# Patient Record
Sex: MALE | Race: WHITE | NOT HISPANIC OR LATINO | Employment: OTHER | URBAN - NONMETROPOLITAN AREA
[De-identification: names, ages, dates, MRNs, and addresses within clinical notes are randomized per-mention and may not be internally consistent; named-entity substitution may affect disease eponyms.]

---

## 2017-05-22 ENCOUNTER — TELEPHONE (OUTPATIENT)
Dept: ENDOCRINOLOGY | Facility: CLINIC | Age: 32
End: 2017-05-22

## 2017-12-18 ENCOUNTER — OFFICE VISIT (OUTPATIENT)
Dept: ENDOCRINOLOGY | Facility: CLINIC | Age: 32
End: 2017-12-18

## 2017-12-18 VITALS
HEART RATE: 82 BPM | HEIGHT: 72 IN | SYSTOLIC BLOOD PRESSURE: 122 MMHG | DIASTOLIC BLOOD PRESSURE: 72 MMHG | WEIGHT: 159.9 LBS | BODY MASS INDEX: 21.66 KG/M2

## 2017-12-18 DIAGNOSIS — E10.42 DIABETIC POLYNEUROPATHY ASSOCIATED WITH TYPE 1 DIABETES MELLITUS (HCC): ICD-10-CM

## 2017-12-18 DIAGNOSIS — Z72.0 TOBACCO ABUSE DISORDER: ICD-10-CM

## 2017-12-18 DIAGNOSIS — E10.3493 TYPE 1 DIABETES MELLITUS WITH SEVERE NONPROLIFERATIVE RETINOPATHY OF BOTH EYES, MACULAR EDEMA PRESENCE UNSPECIFIED (HCC): Primary | ICD-10-CM

## 2017-12-18 LAB — GLUCOSE BLDC GLUCOMTR-MCNC: 413 MG/DL (ref 70–130)

## 2017-12-18 PROCEDURE — 99204 OFFICE O/P NEW MOD 45 MIN: CPT | Performed by: INTERNAL MEDICINE

## 2017-12-18 PROCEDURE — 99406 BEHAV CHNG SMOKING 3-10 MIN: CPT | Performed by: INTERNAL MEDICINE

## 2017-12-18 PROCEDURE — 95250 CONT GLUC MNTR PHYS/QHP EQP: CPT | Performed by: INTERNAL MEDICINE

## 2017-12-18 RX ORDER — PANTOPRAZOLE SODIUM 40 MG/1
40 TABLET, DELAYED RELEASE ORAL 2 TIMES DAILY
COMMUNITY
End: 2019-03-19 | Stop reason: HOSPADM

## 2017-12-18 RX ORDER — GLUCOSAMINE HCL/CHONDROITIN SU 500-400 MG
CAPSULE ORAL
Qty: 120 EACH | Refills: 11 | Status: SHIPPED | OUTPATIENT
Start: 2017-12-18

## 2017-12-18 RX ORDER — ISOPROPYL ALCOHOL 0.7 ML/1
SWAB TOPICAL
Qty: 120 EACH | Refills: 11 | Status: SHIPPED | OUTPATIENT
Start: 2017-12-18

## 2017-12-18 RX ORDER — LANCING DEVICE
EACH MISCELLANEOUS
Qty: 1 EACH | Refills: 1 | Status: SHIPPED | OUTPATIENT
Start: 2017-12-18

## 2017-12-18 NOTE — PROGRESS NOTES
Jose Shah is a 32 y.o. male who presents for  evaluation of   Chief Complaint   Patient presents with   • Diabetes       Referring provider    Dagmar Kaur MD  [address] [place] [address]    Primary Care Provider    [facility]    Duration since age 13 years     Timing - Diabetes is Constant    Quality -  poorly controlled    Severity -  severe    Complications - peripheral neuropathy and h o DKA    Current symptoms/problems  increase appetite, paresthesia of the feet, polydipsia and polyuria     Alleviating Factors: Compliance      Side Effects  none    Current diet  high carb    Current exercise none    Current monitoring regimen: home blood tests - rarely checking  Home blood sugar records:    Hypoglycemia if skipped meals     Past Medical History:   Diagnosis Date   • Diabetic polyneuropathy associated with type 1 diabetes mellitus 12/18/2017   • Tobacco abuse disorder 12/18/2017   • Type 1 diabetes mellitus with severe nonproliferative retinopathy of both eyes 12/18/2017     Family History   Problem Relation Age of Onset   • Hypertension Mother      Social History   Substance Use Topics   • Smoking status: Current Every Day Smoker   • Smokeless tobacco: Never Used   • Alcohol use None         Current Outpatient Prescriptions:   •  pantoprazole (PROTONIX) 40 MG EC tablet, Take 40 mg by mouth Daily., Disp: , Rfl:   •  Alcohol Swabs (ALCOHOL WIPES) 70 % pads, Use 4 x daily, Disp: 120 each, Rfl: 11  •  Blood Glucose Monitoring Suppl w/Device kit, USE AS INDICATED, ANY MONITOR, Disp: 1 each, Rfl: 1  •  Glucose Blood (BLOOD GLUCOSE TEST) strip, Use 4 x daily, use any brand covered by insurance or same brand as before, Disp: 120 each, Rfl: 11  •  insulin aspart (novoLOG) 100 UNIT/ML injection, Up to 150 units daily through pump, Disp: 5 each, Rfl: 11  •  Lancet Devices (LANCING DEVICE) misc, USE AS INDICATED TO CORRELATE WITH STRIPS AND METER, Disp: 1 each, Rfl: 1  •  Lancets 30G misc, USE 4 X DAILY,  Disp: 120 each, Rfl: 11  •  Urine Glucose-Ketones Test strip, 1 each by In Vitro route As Needed (elevated glucose)., Disp: 100 each, Rfl: 11    Review of Systems    Review of Systems   Constitutional: Positive for fatigue and unexpected weight change. Negative for activity change, appetite change, chills, diaphoresis and fever.   HENT: Negative for congestion, dental problem, drooling, ear discharge, ear pain, facial swelling, mouth sores, postnasal drip, rhinorrhea, sinus pressure, sore throat, tinnitus, trouble swallowing and voice change.    Eyes: Negative for photophobia, pain, discharge, redness, itching and visual disturbance.   Respiratory: Negative for apnea, cough, choking, chest tightness, shortness of breath, wheezing and stridor.    Cardiovascular: Negative for chest pain, palpitations and leg swelling.   Gastrointestinal: Negative for abdominal distention, abdominal pain, constipation, diarrhea, nausea and vomiting.   Endocrine: Positive for polydipsia and polyphagia. Negative for cold intolerance, heat intolerance and polyuria.   Genitourinary: Negative for decreased urine volume, difficulty urinating, dysuria, flank pain, frequency, hematuria and urgency.   Musculoskeletal: Positive for arthralgias and myalgias. Negative for back pain, gait problem, joint swelling, neck pain and neck stiffness.   Skin: Negative for color change, pallor, rash and wound.   Allergic/Immunologic: Negative for immunocompromised state.   Neurological: Positive for weakness. Negative for dizziness, tremors, seizures, syncope, facial asymmetry, speech difficulty, light-headedness, numbness and headaches.   Hematological: Negative for adenopathy.   Psychiatric/Behavioral: Negative for agitation, behavioral problems, confusion, decreased concentration, dysphoric mood, hallucinations, self-injury, sleep disturbance and suicidal ideas. The patient is not nervous/anxious and is not hyperactive.         Objective:   /72 (BP  "Location: Right arm, Patient Position: Sitting, Cuff Size: Large Adult)  Pulse 82  Ht 182.9 cm (72\")  Wt 72.5 kg (159 lb 14.4 oz)  BMI 21.69 kg/m2    Physical Exam   Constitutional: He is oriented to person, place, and time. He appears well-developed and well-nourished. He is cooperative.   HENT:   Head: Normocephalic and atraumatic.   Right Ear: External ear normal.   Left Ear: External ear normal.   Nose: Nose normal.   Mouth/Throat: Oropharynx is clear and moist. No oropharyngeal exudate.   Eyes: Conjunctivae and EOM are normal. Pupils are equal, round, and reactive to light. No scleral icterus. Right eye exhibits normal extraocular motion. Left eye exhibits normal extraocular motion.   Neck: Neck supple. No JVD present. No muscular tenderness present. No tracheal deviation, no edema and no erythema present. No thyromegaly present.   Cardiovascular: Normal rate, regular rhythm, normal heart sounds and intact distal pulses.  Exam reveals no gallop and no friction rub.    No murmur heard.  Pulmonary/Chest: Effort normal and breath sounds normal. No stridor. No respiratory distress. He has no decreased breath sounds. He has no wheezes. He has no rhonchi. He has no rales. He exhibits no tenderness.   Abdominal: Soft. Bowel sounds are normal. He exhibits no distension and no mass. There is no hepatomegaly. There is no tenderness. There is no rebound and no guarding. No hernia.   Musculoskeletal: Normal range of motion. He exhibits no edema, tenderness or deformity.   Lymphadenopathy:     He has no cervical adenopathy.   Neurological: He is alert and oriented to person, place, and time. He has normal reflexes. No cranial nerve deficit. He exhibits normal muscle tone. Coordination normal.   Skin: Skin is warm. No rash noted. No erythema. No pallor.   Psychiatric: He has a normal mood and affect. His behavior is normal. Judgment and thought content normal.   Nursing note and vitals reviewed.      Lab " Review    Results for orders placed or performed in visit on 12/18/17   POC Glucose Fingerstick   Result Value Ref Range    Glucose 413 (A) 70 - 130 mg/dL           Assessment/Plan       ICD-10-CM ICD-9-CM   1. Type 1 diabetes mellitus with severe nonproliferative retinopathy of both eyes, macular edema presence unspecified E10.3493 250.51     362.06   2. Diabetic polyneuropathy associated with type 1 diabetes mellitus E10.42 250.61     357.2   3. Tobacco abuse disorder Z72.0 305.1       Glycemic Management:   No results found for: HGBA1C  No results found for: GLUCOSE, BUN, CREATININE, EGFRIFNONA, EGFRIFAFRI, BCR, K, CO2, CALCIUM, PROTENTOTREF, ALBUMIN, LABIL2, AST, ALT, ANIONGAP  No results found for: WBC, HGB, HCT, MCV, PLT    Basal     MN 1 u per hour  3 am 1.8 u per hour  -1.6  Noon 2 u per hour  -1.6  7pm 1.2 u per hour    Carb Ratio 8-10    Sensitivity 30-50    Uncontrolled diabetes  Hypoglycemia and Hyperglycemia    Insertion of continuous glucose monitor to define pattern    The continuous glucose monitor that was inserted is a yasmin glucose monitor      Lipid Management  No results found for: CHOL  No results found for: TRIG  No results found for: HDL  No results found for: LDLCALC  No results found for: LDL  No results found for: LDLDIRECT            Blood Pressure Management:    Vitals:    12/18/17 1503   BP: 122/72   Pulse: 82     No results found for: GLUCOSE, CALCIUM, NA, K, CO2, CL, BUN, CREATININE, EGFRIFAFRI, EGFRIFNONA, BCR, ANIONGAP            Microvascular Complication Monitoring:      Eye Exam Evaluation, within 1 year    -----------    Last Microalbumin-Proteinuria Assessment    No results found for: MALBCRERATIO    No results found for: UTPCR    -----------      Neuropathy, yes       Preventive Care:      I advised the patient of the risks in continuing to use tobacco, and I provided this patient with smoking cessation educational materials.  I also discussed how to quit smoking and the  patient has expressed the willingness to quit.      During this visit, I spent 3-10 mintues counseling the patient regarding smoking cessation.             Weight Related:   Wt Readings from Last 3 Encounters:   12/18/17 72.5 kg (159 lb 14.4 oz)     Body mass index is 21.69 kg/(m^2).        Diet interventions: moderate (500 kCal/d) deficit diet.      Bone Health    No results found for: PTH, CALCIUM, CAION, PHOS, [id number]    Thyroid Health    No results found for: TSH          Other Diabetes Related Aspects       No results found for: IGHXTIXS04        Last celiac panel     No results found for: GDPABIGA, TTRANSGLIGA, IGA, [id number]      I reviewed and summarized records from [facility] from 2017 and I reviewed / ordered labs.     Orders Placed This Encounter   Procedures   • POC Glucose Fingerstick         A copy of my note was sent to [facility]    Please see my above opinion and suggestions.

## 2018-03-30 ENCOUNTER — APPOINTMENT (OUTPATIENT)
Dept: LAB | Facility: HOSPITAL | Age: 33
End: 2018-03-30

## 2018-03-30 ENCOUNTER — OFFICE VISIT (OUTPATIENT)
Dept: ENDOCRINOLOGY | Facility: CLINIC | Age: 33
End: 2018-03-30

## 2018-03-30 VITALS
HEIGHT: 72 IN | DIASTOLIC BLOOD PRESSURE: 74 MMHG | HEART RATE: 85 BPM | OXYGEN SATURATION: 96 % | SYSTOLIC BLOOD PRESSURE: 110 MMHG | BODY MASS INDEX: 21.67 KG/M2 | WEIGHT: 160 LBS

## 2018-03-30 DIAGNOSIS — E55.9 VITAMIN D DEFICIENCY: ICD-10-CM

## 2018-03-30 DIAGNOSIS — E53.8 B12 DEFICIENCY: ICD-10-CM

## 2018-03-30 DIAGNOSIS — IMO0002 TYPE 1 DIABETES, UNCONTROLLED, WITH GASTROPARESIS: Primary | ICD-10-CM

## 2018-03-30 LAB
25(OH)D3 SERPL-MCNC: 22.5 NG/ML (ref 30–100)
ALBUMIN SERPL-MCNC: 4 G/DL (ref 3.4–4.8)
ALBUMIN UR-MCNC: 59.5 MG/L
ALBUMIN/GLOB SERPL: 1.3 G/DL (ref 1.1–1.8)
ALP SERPL-CCNC: 95 U/L (ref 38–126)
ALT SERPL W P-5'-P-CCNC: 28 U/L (ref 21–72)
ANION GAP SERPL CALCULATED.3IONS-SCNC: 11 MMOL/L (ref 5–15)
ARTICHOKE IGE QN: 154 MG/DL (ref 1–129)
AST SERPL-CCNC: 57 U/L (ref 17–59)
BASOPHILS # BLD AUTO: 0.04 10*3/MM3 (ref 0–0.2)
BASOPHILS NFR BLD AUTO: 0.6 % (ref 0–2)
BILIRUB SERPL-MCNC: 0.7 MG/DL (ref 0.2–1.3)
BUN BLD-MCNC: 29 MG/DL (ref 7–21)
BUN/CREAT SERPL: 26.9 (ref 7–25)
CALCIUM SPEC-SCNC: 10.3 MG/DL (ref 8.4–10.2)
CHLORIDE SERPL-SCNC: 98 MMOL/L (ref 95–110)
CHOLEST SERPL-MCNC: 227 MG/DL (ref 0–199)
CO2 SERPL-SCNC: 31 MMOL/L (ref 22–31)
CREAT BLD-MCNC: 1.08 MG/DL (ref 0.7–1.3)
CREAT UR-MCNC: 85.5 MG/DL
DEPRECATED RDW RBC AUTO: 38.9 FL (ref 35.1–43.9)
EOSINOPHIL # BLD AUTO: 0.13 10*3/MM3 (ref 0–0.7)
EOSINOPHIL NFR BLD AUTO: 2 % (ref 0–7)
ERYTHROCYTE [DISTWIDTH] IN BLOOD BY AUTOMATED COUNT: 12.6 % (ref 11.5–14.5)
GFR SERPL CREATININE-BSD FRML MDRD: 79 ML/MIN/1.73 (ref 70–162)
GLOBULIN UR ELPH-MCNC: 3.2 GM/DL (ref 2.3–3.5)
GLUCOSE BLD-MCNC: 259 MG/DL (ref 60–100)
GLUCOSE BLDC GLUCOMTR-MCNC: 336 MG/DL (ref 70–130)
HBA1C MFR BLD: 12.2 % (ref 4–5.6)
HCT VFR BLD AUTO: 36.6 % (ref 39–49)
HDLC SERPL-MCNC: 31 MG/DL (ref 60–200)
HGB BLD-MCNC: 13.3 G/DL (ref 13.7–17.3)
IMM GRANULOCYTES # BLD: 0.01 10*3/MM3 (ref 0–0.02)
IMM GRANULOCYTES NFR BLD: 0.2 % (ref 0–0.5)
LDLC/HDLC SERPL: 5.73 {RATIO} (ref 0–3.55)
LYMPHOCYTES # BLD AUTO: 2.63 10*3/MM3 (ref 0.6–4.2)
LYMPHOCYTES NFR BLD AUTO: 40.2 % (ref 10–50)
MCH RBC QN AUTO: 30.8 PG (ref 26.5–34)
MCHC RBC AUTO-ENTMCNC: 36.3 G/DL (ref 31.5–36.3)
MCV RBC AUTO: 84.7 FL (ref 80–98)
MICROALBUMIN/CREAT UR: 695.9 MG/G (ref 0–30)
MONOCYTES # BLD AUTO: 0.33 10*3/MM3 (ref 0–0.9)
MONOCYTES NFR BLD AUTO: 5 % (ref 0–12)
NEUTROPHILS # BLD AUTO: 3.4 10*3/MM3 (ref 2–8.6)
NEUTROPHILS NFR BLD AUTO: 52 % (ref 37–80)
PLATELET # BLD AUTO: 203 10*3/MM3 (ref 150–450)
PMV BLD AUTO: 9.9 FL (ref 8–12)
POTASSIUM BLD-SCNC: 4.8 MMOL/L (ref 3.5–5.1)
PROT SERPL-MCNC: 7.2 G/DL (ref 6.3–8.6)
RBC # BLD AUTO: 4.32 10*6/MM3 (ref 4.37–5.74)
SODIUM BLD-SCNC: 140 MMOL/L (ref 137–145)
TRIGL SERPL-MCNC: 92 MG/DL (ref 20–199)
TSH SERPL DL<=0.05 MIU/L-ACNC: 4.53 MIU/ML (ref 0.46–4.68)
VIT B12 BLD-MCNC: 903 PG/ML (ref 239–931)
WBC NRBC COR # BLD: 6.54 10*3/MM3 (ref 3.2–9.8)

## 2018-03-30 PROCEDURE — 84443 ASSAY THYROID STIM HORMONE: CPT | Performed by: INTERNAL MEDICINE

## 2018-03-30 PROCEDURE — 36415 COLL VENOUS BLD VENIPUNCTURE: CPT | Performed by: INTERNAL MEDICINE

## 2018-03-30 PROCEDURE — 83516 IMMUNOASSAY NONANTIBODY: CPT | Performed by: INTERNAL MEDICINE

## 2018-03-30 PROCEDURE — 80053 COMPREHEN METABOLIC PANEL: CPT | Performed by: INTERNAL MEDICINE

## 2018-03-30 PROCEDURE — 80061 LIPID PANEL: CPT | Performed by: INTERNAL MEDICINE

## 2018-03-30 PROCEDURE — 82784 ASSAY IGA/IGD/IGG/IGM EACH: CPT | Performed by: INTERNAL MEDICINE

## 2018-03-30 PROCEDURE — 95250 CONT GLUC MNTR PHYS/QHP EQP: CPT | Performed by: INTERNAL MEDICINE

## 2018-03-30 PROCEDURE — 83036 HEMOGLOBIN GLYCOSYLATED A1C: CPT | Performed by: INTERNAL MEDICINE

## 2018-03-30 PROCEDURE — 82043 UR ALBUMIN QUANTITATIVE: CPT | Performed by: INTERNAL MEDICINE

## 2018-03-30 PROCEDURE — 85025 COMPLETE CBC W/AUTO DIFF WBC: CPT | Performed by: INTERNAL MEDICINE

## 2018-03-30 PROCEDURE — 99214 OFFICE O/P EST MOD 30 MIN: CPT | Performed by: INTERNAL MEDICINE

## 2018-03-30 PROCEDURE — 82607 VITAMIN B-12: CPT | Performed by: INTERNAL MEDICINE

## 2018-03-30 PROCEDURE — 82306 VITAMIN D 25 HYDROXY: CPT | Performed by: INTERNAL MEDICINE

## 2018-03-30 PROCEDURE — 82570 ASSAY OF URINE CREATININE: CPT | Performed by: INTERNAL MEDICINE

## 2018-03-30 NOTE — PROGRESS NOTES
Jose Shah is a 32 y.o. male who presents for  evaluation of   Chief Complaint   Patient presents with   • Diabetes     BS/336       Referring provider    No referring provider defined for this encounter.    Primary Care Provider    M[facility]OU[facility] Guy    Duration since age 13 years     Timing - Diabetes is Constant    Quality -  poorly controlled    Severity -  severe    Complications - peripheral neuropathy and h o DKA    Current symptoms/problems  increase appetite, paresthesia of the feet, polydipsia and polyuria     Alleviating Factors: Compliance      Side Effects  none    Current diet  high carb    Current exercise none    Current monitoring regimen: home blood tests - Testing 4 x daily for the past 90 days    Home blood sugar records:    Hypoglycemia if skipped meals      Range 50 to 500             Past Medical History:   Diagnosis Date   • Diabetic polyneuropathy associated with type 1 diabetes mellitus 12/18/2017   • Tobacco abuse disorder 12/18/2017   • Type 1 diabetes mellitus with severe nonproliferative retinopathy of both eyes 12/18/2017     Family History   Problem Relation Age of Onset   • Hypertension Mother      Social History   Substance Use Topics   • Smoking status: Current Every Day Smoker   • Smokeless tobacco: Never Used   • Alcohol use Not on file         Current Outpatient Prescriptions:   •  Alcohol Swabs (ALCOHOL WIPES) 70 % pads, Use 4 x daily, Disp: 120 each, Rfl: 11  •  Blood Glucose Monitoring Suppl w/Device kit, USE AS INDICATED, ANY MONITOR, Disp: 1 each, Rfl: 1  •  Glucose Blood (BLOOD GLUCOSE TEST) strip, Use 4 x daily, use any brand covered by insurance or same brand as before, Disp: 120 each, Rfl: 11  •  insulin aspart (novoLOG) 100 UNIT/ML injection, Up to 150 units daily through pump, Disp: 5 each, Rfl: 11  •  Lancet Devices (LANCING DEVICE) misc, USE AS INDICATED TO CORRELATE WITH STRIPS AND METER, Disp: 1 each, Rfl: 1  •  Lancets 30G misc, USE 4 X DAILY, Disp: 120 each,  Rfl: 11  •  pantoprazole (PROTONIX) 40 MG EC tablet, Take 40 mg by mouth Daily., Disp: , Rfl:   •  Urine Glucose-Ketones Test strip, 1 each by In Vitro route As Needed (elevated glucose)., Disp: 100 each, Rfl: 11    Review of Systems    Review of Systems   Constitutional: Positive for fatigue and unexpected weight change. Negative for activity change, appetite change, chills, diaphoresis and fever.   HENT: Negative for congestion, dental problem, drooling, ear discharge, ear pain, facial swelling, mouth sores, postnasal drip, rhinorrhea, sinus pressure, sore throat, tinnitus, trouble swallowing and voice change.    Eyes: Negative for photophobia, pain, discharge, redness, itching and visual disturbance.   Respiratory: Negative for apnea, cough, choking, chest tightness, shortness of breath, wheezing and stridor.    Cardiovascular: Negative for chest pain, palpitations and leg swelling.   Gastrointestinal: Negative for abdominal distention, abdominal pain, constipation, diarrhea, nausea and vomiting.   Endocrine: Positive for polydipsia and polyphagia. Negative for cold intolerance, heat intolerance and polyuria.   Genitourinary: Negative for decreased urine volume, difficulty urinating, dysuria, flank pain, frequency, hematuria and urgency.   Musculoskeletal: Positive for arthralgias and myalgias. Negative for back pain, gait problem, joint swelling, neck pain and neck stiffness.   Skin: Negative for color change, pallor, rash and wound.   Allergic/Immunologic: Negative for immunocompromised state.   Neurological: Positive for weakness. Negative for dizziness, tremors, seizures, syncope, facial asymmetry, speech difficulty, light-headedness, numbness and headaches.   Hematological: Negative for adenopathy.   Psychiatric/Behavioral: Negative for agitation, behavioral problems, confusion, decreased concentration, dysphoric mood, hallucinations, self-injury, sleep disturbance and suicidal ideas. The patient is not  "nervous/anxious and is not hyperactive.         Objective:   /74 (BP Location: Left arm, Patient Position: Sitting, Cuff Size: Large Adult)   Pulse 85   Ht 182.9 cm (72\")   Wt 72.6 kg (160 lb)   SpO2 96%   BMI 21.70 kg/m²     Physical Exam   Constitutional: He is oriented to person, place, and time. He appears well-developed and well-nourished. He is cooperative.   HENT:   Head: Normocephalic and atraumatic.   Right Ear: External ear normal.   Left Ear: External ear normal.   Nose: Nose normal.   Mouth/Throat: Oropharynx is clear and moist. No oropharyngeal exudate.   Eyes: Conjunctivae and EOM are normal. Pupils are equal, round, and reactive to light. No scleral icterus. Right eye exhibits normal extraocular motion. Left eye exhibits normal extraocular motion.   Neck: Neck supple. No JVD present. No muscular tenderness present. No tracheal deviation, no edema and no erythema present. No thyromegaly present.   Cardiovascular: Normal rate, regular rhythm, normal heart sounds and intact distal pulses.  Exam reveals no gallop and no friction rub.    No murmur heard.  Pulmonary/Chest: Effort normal and breath sounds normal. No stridor. No respiratory distress. He has no decreased breath sounds. He has no wheezes. He has no rhonchi. He has no rales. He exhibits no tenderness.   Abdominal: Soft. Bowel sounds are normal. He exhibits no distension and no mass. There is no hepatomegaly. There is no tenderness. There is no rebound and no guarding. No hernia.   Musculoskeletal: Normal range of motion. He exhibits no edema, tenderness or deformity.   Lymphadenopathy:     He has no cervical adenopathy.   Neurological: He is alert and oriented to person, place, and time. He has normal reflexes. No cranial nerve deficit. He exhibits normal muscle tone. Coordination normal.   Skin: Skin is warm. No rash noted. No erythema. No pallor.   Psychiatric: He has a normal mood and affect. His behavior is normal. Judgment and " thought content normal.   Nursing note and vitals reviewed.      Lab Review    Results for orders placed or performed in visit on 03/30/18   CBC Auto Differential   Result Value Ref Range    WBC 6.54 3.20 - 9.80 10*3/mm3    RBC 4.32 (L) 4.37 - 5.74 10*6/mm3    Hemoglobin 13.3 (L) 13.7 - 17.3 g/dL    Hematocrit 36.6 (L) 39.0 - 49.0 %    MCV 84.7 80.0 - 98.0 fL    MCH 30.8 26.5 - 34.0 pg    MCHC 36.3 31.5 - 36.3 g/dL    RDW 12.6 11.5 - 14.5 %    RDW-SD 38.9 35.1 - 43.9 fl    MPV 9.9 8.0 - 12.0 fL    Platelets 203 150 - 450 10*3/mm3    Neutrophil % 52.0 37.0 - 80.0 %    Lymphocyte % 40.2 10.0 - 50.0 %    Monocyte % 5.0 0.0 - 12.0 %    Eosinophil % 2.0 0.0 - 7.0 %    Basophil % 0.6 0.0 - 2.0 %    Immature Grans % 0.2 0.0 - 0.5 %    Neutrophils, Absolute 3.40 2.00 - 8.60 10*3/mm3    Lymphocytes, Absolute 2.63 0.60 - 4.20 10*3/mm3    Monocytes, Absolute 0.33 0.00 - 0.90 10*3/mm3    Eosinophils, Absolute 0.13 0.00 - 0.70 10*3/mm3    Basophils, Absolute 0.04 0.00 - 0.20 10*3/mm3    Immature Grans, Absolute 0.01 0.00 - 0.02 10*3/mm3   Celiac Panel Reflex To Titer   Result Value Ref Range    Gliadin Deamidated Peptide Ab, IgA 4 0 - 19 units    Tissue Transglutaminase IgA <2 0 - 3 U/mL    IgA 230 90 - 386 mg/dL   Comprehensive Metabolic Panel   Result Value Ref Range    Glucose 259 (H) 60 - 100 mg/dL    BUN 29 (H) 7 - 21 mg/dL    Creatinine 1.08 0.70 - 1.30 mg/dL    Sodium 140 137 - 145 mmol/L    Potassium 4.8 3.5 - 5.1 mmol/L    Chloride 98 95 - 110 mmol/L    CO2 31.0 22.0 - 31.0 mmol/L    Calcium 10.3 (H) 8.4 - 10.2 mg/dL    Total Protein 7.2 6.3 - 8.6 g/dL    Albumin 4.00 3.40 - 4.80 g/dL    ALT (SGPT) 28 21 - 72 U/L    AST (SGOT) 57 17 - 59 U/L    Alkaline Phosphatase 95 38 - 126 U/L    Total Bilirubin 0.7 0.2 - 1.3 mg/dL    eGFR Non  Amer 79 70 - 162 mL/min/1.73    Globulin 3.2 2.3 - 3.5 gm/dL    A/G Ratio 1.3 1.1 - 1.8 g/dL    BUN/Creatinine Ratio 26.9 (H) 7.0 - 25.0    Anion Gap 11.0 5.0 - 15.0 mmol/L    Hemoglobin A1c   Result Value Ref Range    Hemoglobin A1C 12.2 (H) 4 - 5.6 %   Lipid Panel   Result Value Ref Range    Total Cholesterol 227 (H) 0 - 199 mg/dL    Triglycerides 92 20 - 199 mg/dL    HDL Cholesterol 31 (L) 60 - 200 mg/dL    LDL Cholesterol  154 (H) 1 - 129 mg/dL    LDL/HDL Ratio 5.73 (H) 0.00 - 3.55   Microalbumin / Creatinine Urine Ratio - Urine, Clean Catch   Result Value Ref Range    Microalbumin/Creatinine Ratio 695.9 (H) 0.0 - 30.0 mg/g    Creatinine, Urine 85.5 mg/dL    Microalbumin, Urine 59.5 mg/L   TSH   Result Value Ref Range    TSH 4.530 0.460 - 4.680 mIU/mL   Vitamin B12   Result Value Ref Range    Vitamin B-12 903 239 - 931 pg/mL   Vitamin D 25 Hydroxy   Result Value Ref Range    25 Hydroxy, Vitamin D 22.5 (L) 30.0 - 100.0 ng/ml   POC Glucose   Result Value Ref Range    Glucose 336 (A) 70 - 130 mg/dL           Assessment/Plan       ICD-10-CM ICD-9-CM   1. Type 1 diabetes, uncontrolled, with gastroparesis E10.43 250.63    K31.84 536.3    E10.65    2. Vitamin D deficiency E55.9 268.9   3. B12 deficiency E53.8 266.2       Glycemic Management:   Lab Results   Component Value Date    HGBA1C 12.2 (H) 03/30/2018     Lab Results   Component Value Date    GLUCOSE 259 (H) 03/30/2018    BUN 29 (H) 03/30/2018    CREATININE 1.08 03/30/2018    EGFRIFNONA 79 03/30/2018    BCR 26.9 (H) 03/30/2018    K 4.8 03/30/2018    CO2 31.0 03/30/2018    CALCIUM 10.3 (H) 03/30/2018    ALBUMIN 4.00 03/30/2018    LABIL2 1.3 03/30/2018    AST 57 03/30/2018    ALT 28 03/30/2018    ANIONGAP 11.0 03/30/2018     Lab Results   Component Value Date    WBC 6.54 03/30/2018    HGB 13.3 (L) 03/30/2018    HCT 36.6 (L) 03/30/2018    MCV 84.7 03/30/2018     03/30/2018       Basal     MN 1 u per hour  3 am 1.8 u per hour  -1.6-1.4  Noon 2 u per hour  -1.6-1.4  7pm 1.2 u per hour    Carb Ratio 8-10    Sensitivity 30-50    Uncontrolled diabetes  Hypoglycemia and Hyperglycemia    Insertion of continuous glucose monitor to define  pattern    The continuous glucose monitor that was inserted is a yasmin glucose monitor      Patient makes self adjustments while using the pump     Widely fluctuating blood sugars          Lipid Management  Lab Results   Component Value Date    CHOL 227 (H) 03/30/2018     Lab Results   Component Value Date    TRIG 92 03/30/2018     Lab Results   Component Value Date    HDL 31 (L) 03/30/2018     No components found for: LDLCALC  Lab Results   Component Value Date     (H) 03/30/2018     No results found for: LDLDIRECT            Blood Pressure Management:    Vitals:    03/30/18 1100   BP: 110/74   Pulse: 85   SpO2: 96%     Lab Results   Component Value Date    GLUCOSE 259 (H) 03/30/2018    CALCIUM 10.3 (H) 03/30/2018     03/30/2018    K 4.8 03/30/2018    CO2 31.0 03/30/2018    CL 98 03/30/2018    BUN 29 (H) 03/30/2018    CREATININE 1.08 03/30/2018    EGFRIFNONA 79 03/30/2018    BCR 26.9 (H) 03/30/2018    ANIONGAP 11.0 03/30/2018               Microvascular Complication Monitoring:      Eye Exam Evaluation, within 1 year    -----------    Last Microalbumin-Proteinuria Assessment    Lab Results   Component Value Date    MALBCRERATIO 695.9 (H) 03/30/2018       No results found for: UTPCR    -----------      Neuropathy, yes       Preventive Care:      I advised the patient of the risks in continuing to use tobacco, and I provided this patient with smoking cessation educational materials.  I also discussed how to quit smoking and the patient has expressed the willingness to quit.      During this visit, I spent 3-10 mintues counseling the patient regarding smoking cessation.             Weight Related:   Wt Readings from Last 3 Encounters:   03/30/18 72.6 kg (160 lb)   12/18/17 72.5 kg (159 lb 14.4 oz)     Body mass index is 21.7 kg/m².        Diet interventions: moderate (500 kCal/d) deficit diet.      Bone Health    Lab Results   Component Value Date    CALCIUM 10.3 (H) 03/30/2018    [id number] 22.5 (L)  03/30/2018       Thyroid Health    Lab Results   Component Value Date    TSH 4.530 03/30/2018             Other Diabetes Related Aspects       Lab Results   Component Value Date    OQYBVTOD80 903 03/30/2018           Last celiac panel     Lab Results   Component Value Date    GDPABIGA 4 03/30/2018    TTRANSGLIGA <2 03/30/2018     03/30/2018         I reviewed and summarized records from [facility] from 2017 and I reviewed / ordered labs.     Orders Placed This Encounter   Procedures   • CBC Auto Differential   • Celiac Panel Reflex To Titer   • Comprehensive Metabolic Panel   • Hemoglobin A1c   • Lipid Panel   • Microalbumin / Creatinine Urine Ratio - Urine, Clean Catch   • TSH   • Vitamin B12   • Vitamin D 25 Hydroxy   • POC Glucose         A copy of my note was sent to [facility]    Please see my above opinion and suggestions.           This document has been electronically signed by Carlton Oconnor MD on July 24, 2018 10:13 AM

## 2018-03-31 LAB
GLIADIN PEPTIDE IGA SER-ACNC: 4 UNITS (ref 0–19)
IGA SERPL-MCNC: 230 MG/DL (ref 90–386)
TTG IGA SER-ACNC: <2 U/ML (ref 0–3)

## 2018-04-04 DIAGNOSIS — E53.8 B12 DEFICIENCY: ICD-10-CM

## 2018-04-04 DIAGNOSIS — E55.9 VITAMIN D DEFICIENCY: ICD-10-CM

## 2018-04-04 DIAGNOSIS — IMO0002 TYPE 1 DIABETES, UNCONTROLLED, WITH GASTROPARESIS: Primary | ICD-10-CM

## 2018-04-04 RX ORDER — ERGOCALCIFEROL 1.25 MG/1
50000 CAPSULE ORAL
Qty: 4 CAPSULE | Refills: 11 | Status: SHIPPED | OUTPATIENT
Start: 2018-04-04 | End: 2019-03-01

## 2018-07-19 ENCOUNTER — TELEPHONE (OUTPATIENT)
Dept: FAMILY MEDICINE CLINIC | Facility: CLINIC | Age: 33
End: 2018-07-19

## 2018-07-24 DIAGNOSIS — E10.65 UNCONTROLLED TYPE 1 DIABETES MELLITUS WITH HYPERGLYCEMIA (HCC): Primary | ICD-10-CM

## 2018-10-30 ENCOUNTER — OFFICE VISIT (OUTPATIENT)
Dept: ENDOCRINOLOGY | Facility: CLINIC | Age: 33
End: 2018-10-30

## 2018-10-30 VITALS
OXYGEN SATURATION: 98 % | DIASTOLIC BLOOD PRESSURE: 66 MMHG | SYSTOLIC BLOOD PRESSURE: 110 MMHG | HEART RATE: 116 BPM | HEIGHT: 72 IN | WEIGHT: 154.9 LBS | BODY MASS INDEX: 20.98 KG/M2

## 2018-10-30 DIAGNOSIS — E10.42 DIABETIC POLYNEUROPATHY ASSOCIATED WITH TYPE 1 DIABETES MELLITUS (HCC): ICD-10-CM

## 2018-10-30 DIAGNOSIS — Z72.0 TOBACCO ABUSE DISORDER: ICD-10-CM

## 2018-10-30 DIAGNOSIS — E55.9 VITAMIN D DEFICIENCY: ICD-10-CM

## 2018-10-30 DIAGNOSIS — E53.8 B12 DEFICIENCY: ICD-10-CM

## 2018-10-30 DIAGNOSIS — E10.65 UNCONTROLLED TYPE 1 DIABETES MELLITUS WITH HYPERGLYCEMIA (HCC): Primary | ICD-10-CM

## 2018-10-30 PROCEDURE — 95250 CONT GLUC MNTR PHYS/QHP EQP: CPT | Performed by: INTERNAL MEDICINE

## 2018-10-30 PROCEDURE — 99214 OFFICE O/P EST MOD 30 MIN: CPT | Performed by: INTERNAL MEDICINE

## 2018-11-13 ENCOUNTER — OFFICE VISIT (OUTPATIENT)
Dept: ENDOCRINOLOGY | Facility: CLINIC | Age: 33
End: 2018-11-13

## 2018-11-13 VITALS
BODY MASS INDEX: 23.57 KG/M2 | DIASTOLIC BLOOD PRESSURE: 80 MMHG | HEART RATE: 115 BPM | WEIGHT: 174 LBS | SYSTOLIC BLOOD PRESSURE: 158 MMHG | HEIGHT: 72 IN

## 2018-11-13 DIAGNOSIS — IMO0002 TYPE 1 DIABETES, UNCONTROLLED, WITH GASTROPARESIS: Primary | ICD-10-CM

## 2018-11-13 DIAGNOSIS — Z72.0 TOBACCO ABUSE DISORDER: ICD-10-CM

## 2018-11-13 DIAGNOSIS — E10.42 DIABETIC POLYNEUROPATHY ASSOCIATED WITH TYPE 1 DIABETES MELLITUS (HCC): ICD-10-CM

## 2018-11-13 DIAGNOSIS — E55.9 VITAMIN D DEFICIENCY: ICD-10-CM

## 2018-11-13 PROCEDURE — 99214 OFFICE O/P EST MOD 30 MIN: CPT | Performed by: NURSE PRACTITIONER

## 2018-11-13 NOTE — PROGRESS NOTES
Subjective    Jose Shah is a 33 y.o. male. he is here today for follow-up.    History of Present Illness       Primary Care Provider     Dai Boston APRN     Duration since age 13 years        Timing - Diabetes is Constant     Quality -  poorly controlled     Severity -  severe     Complications - peripheral neuropathy and h o DKA     Current symptoms/problems  increase appetite, paresthesia of the feet, polydipsia and polyuria      Alleviating Factors: Compliance       Side Effects  none     Current diet  high carb     Current exercise none     Current monitoring regimen: home blood tests - Testing 4 x daily for the past 90 days    Lab Results   Component Value Date    HGBA1C 12.2 (H) 03/30/2018          Home blood sugar records:        Tanisha fell off and he cannot find out       Medtronic insulin pump     Average bg 238           Hypoglycemia if skipped meals                           The following portions of the patient's history were reviewed and updated as appropriate:   Past Medical History:   Diagnosis Date   • Diabetic polyneuropathy associated with type 1 diabetes mellitus (CMS/[facility]) 12/18/2017   • Tobacco abuse disorder 12/18/2017   • Type 1 diabetes mellitus with severe nonproliferative retinopathy of both eyes (CMS/[facility]) 12/18/2017     No past surgical history on file.  Family History   Problem Relation Age of Onset   • Hypertension Mother        Current Outpatient Medications   Medication Sig Dispense Refill   • Alcohol Swabs (ALCOHOL WIPES) 70 % pads Use 4 x daily 120 each 11   • Blood Glucose Monitoring Suppl w/Device kit USE AS INDICATED, ANY MONITOR 1 each 1   • Glucose Blood (BLOOD GLUCOSE TEST) strip Use 4 x daily, use any brand covered by insurance or same brand as before 120 each 11   • insulin aspart (novoLOG) 100 UNIT/ML injection Up to 150 units daily through pump 5 each 11   • Lancet Devices (LANCING DEVICE) misc USE AS INDICATED TO CORRELATE WITH STRIPS AND METER 1 each 1   • Lancets  30G misc USE 4 X DAILY 120 each 11   • pantoprazole (PROTONIX) 40 MG EC tablet Take 40 mg by mouth Daily.     • Urine Glucose-Ketones Test strip 1 each by In Vitro route As Needed (elevated glucose). 100 each 11   • vitamin D (ERGOCALCIFEROL) 57783 units capsule capsule Take 1 capsule by mouth Every 7 (Seven) Days. 4 capsule 11     No current facility-administered medications for this visit.      Allergies   Allergen Reactions   • Penicillins Unknown (See Comments)     Has been allergic since a child     Social History     Socioeconomic History   • Marital status: Unknown     Spouse name: Not on file   • Number of children: Not on file   • Years of education: Not on file   • Highest education level: Not on file   Tobacco Use   • Smoking status: Current Every Day Smoker   • Smokeless tobacco: Never Used       Review of Systems  Review of Systems   Constitutional: Negative for activity change, appetite change, diaphoresis and fatigue.   HENT: Negative for facial swelling, sneezing, sore throat, tinnitus, trouble swallowing and voice change.    Eyes: Negative for photophobia, pain, discharge, redness, itching and visual disturbance.   Respiratory: Negative for apnea, cough, choking, chest tightness and shortness of breath.    Cardiovascular: Negative for chest pain, palpitations and leg swelling.   Gastrointestinal: Negative for abdominal distention, abdominal pain, constipation, diarrhea, nausea and vomiting.   Endocrine: Negative for cold intolerance, heat intolerance, polydipsia, polyphagia and polyuria.   Genitourinary: Negative for difficulty urinating, dysuria, frequency, hematuria and urgency.   Musculoskeletal: Negative for arthralgias, back pain, gait problem, joint swelling, myalgias, neck pain and neck stiffness.   Skin: Negative for color change, pallor, rash and wound.   Neurological: Negative for dizziness, tremors, weakness, light-headedness, numbness and headaches.   Hematological: Negative for  "adenopathy. Does not bruise/bleed easily.   Psychiatric/Behavioral: Negative for behavioral problems, confusion and sleep disturbance.        Objective    /80 (BP Location: Left arm, Patient Position: Sitting, Cuff Size: Adult)   Pulse 115   Ht 182.9 cm (72\")   Wt 78.9 kg (174 lb)   BMI 23.60 kg/m²   Physical Exam   Constitutional: He is oriented to person, place, and time. He appears well-developed and well-nourished. No distress.   HENT:   Head: Normocephalic and atraumatic.   Right Ear: External ear normal.   Left Ear: External ear normal.   Nose: Nose normal.   Eyes: Conjunctivae and EOM are normal. Pupils are equal, round, and reactive to light.   Neck: Normal range of motion. Neck supple. No tracheal deviation present. No thyromegaly present.   Cardiovascular: Normal rate, regular rhythm and normal heart sounds.   No murmur heard.  Pulmonary/Chest: Effort normal and breath sounds normal. No respiratory distress. He has no wheezes.   Abdominal: Soft. Bowel sounds are normal. There is no tenderness. There is no rebound and no guarding.   Musculoskeletal: Normal range of motion. He exhibits no edema, tenderness or deformity.   Neurological: He is alert and oriented to person, place, and time. No cranial nerve deficit.   Skin: Skin is warm and dry. No rash noted.   Psychiatric: He has a normal mood and affect. His behavior is normal. Judgment and thought content normal.       Lab Review  Glucose (mg/dL)   Date Value   03/30/2018 259 (H)     Sodium (mmol/L)   Date Value   03/30/2018 140     Potassium (mmol/L)   Date Value   03/30/2018 4.8     Chloride (mmol/L)   Date Value   03/30/2018 98     CO2 (mmol/L)   Date Value   03/30/2018 31.0     BUN (mg/dL)   Date Value   03/30/2018 29 (H)     Creatinine (mg/dL)   Date Value   03/30/2018 1.08     Hemoglobin A1C (%)   Date Value   03/30/2018 12.2 (H)     Triglycerides (mg/dL)   Date Value   03/30/2018 92     LDL Cholesterol  (mg/dL)   Date Value   03/30/2018 154 " (H)       Assessment/Plan      1. Type 1 diabetes, uncontrolled, with gastroparesis (CMS/[facility])    2. Diabetic polyneuropathy associated with type 1 diabetes mellitus (CMS/[facility])    3. Tobacco abuse disorder    4. Vitamin D deficiency    .    Medications prescribed:  Outpatient Encounter Medications as of 11/13/2018   Medication Sig Dispense Refill   • Alcohol Swabs (ALCOHOL WIPES) 70 % pads Use 4 x daily 120 each 11   • Blood Glucose Monitoring Suppl w/Device kit USE AS INDICATED, ANY MONITOR 1 each 1   • Glucose Blood (BLOOD GLUCOSE TEST) strip Use 4 x daily, use any brand covered by insurance or same brand as before 120 each 11   • insulin aspart (novoLOG) 100 UNIT/ML injection Up to 150 units daily through pump 5 each 11   • Lancet Devices (LANCING DEVICE) misc USE AS INDICATED TO CORRELATE WITH STRIPS AND METER 1 each 1   • Lancets 30G misc USE 4 X DAILY 120 each 11   • pantoprazole (PROTONIX) 40 MG EC tablet Take 40 mg by mouth Daily.     • Urine Glucose-Ketones Test strip 1 each by In Vitro route As Needed (elevated glucose). 100 each 11   • vitamin D (ERGOCALCIFEROL) 44677 units capsule capsule Take 1 capsule by mouth Every 7 (Seven) Days. 4 capsule 11     No facility-administered encounter medications on file as of 11/13/2018.        Orders placed during this encounter include:  No orders of the defined types were placed in this encounter.    Glycemic Management:         Lab Results   Component Value Date    HGBA1C 12.2 (H) 03/30/2018       Basal      MN 1 u per hour  3 am -1.4  Noon --1.4  7pm 1.2 u      Carb Ratio  7.5     Sensitivity -40             Patient makes self adjustments while using the pump     Widely fluctuating blood sugars      Noncompliance / nonadherence.        Lipid Management          Component      Latest Ref Rng & Units 3/30/2018   Total Cholesterol      0 - 199 mg/dL 227 (H)   Triglycerides      20 - 199 mg/dL 92   HDL Cholesterol      60 - 200 mg/dL 31 (L)   LDL Cholesterol        1 - 129 mg/dL 154 (H)   LDL/HDL Ratio      0.00 - 3.55 5.73 (H)           Blood Pressure Management:                      Microvascular Complication Monitoring:       Eye Exam Evaluation, within 1 year     -----------     Last Microalbumin-Proteinuria Assessment     Component      Latest Ref Rng & Units 3/30/2018   Microalbumin/Creatinine Ratio      0.0 - 30.0 mg/g 695.9 (H)   Creatinine, Urine      mg/dL 85.5   Microalbumin, Urine      mg/L 59.5     -----------        Neuropathy, yes         Preventive Care:       I advised Jose of the risks of continuing to use tobacco, and I provided him with tobacco cessation educational materials in the After Visit Summary.     During this visit, I spent less than 3  minutes counseling the patient regarding tobacco cessation.                Weight Related:       Patient's Body mass index is 23.6 kg/m². BMI is within normal parameters. No follow-up required.          Diet interventions: moderate (500 kCal/d) deficit diet.        Bone Health           Lab Results   Component Value Date     CALCIUM 10.3 (H) 03/30/2018     [id number] 22.5 (L) 03/30/2018        Vitamin d otc one daily      Thyroid Health           Lab Results   Component Value Date     TSH 4.530 03/30/2018                  Other Diabetes Related Aspects               Lab Results   Component Value Date     [id number] 903 03/30/2018               Last celiac panel            Lab Results   Component Value Date     GDPABIGA 4 03/30/2018     TTRANSGLIGA <2 03/30/2018      03/30/2018            4. Follow-up: Return in about 3 months (around 2/13/2019) for Recheck.

## 2019-01-01 ENCOUNTER — APPOINTMENT (OUTPATIENT)
Dept: CT IMAGING | Age: 34
DRG: 100 | End: 2019-01-01
Attending: HOSPITALIST
Payer: MEDICARE

## 2019-01-01 ENCOUNTER — APPOINTMENT (OUTPATIENT)
Dept: GENERAL RADIOLOGY | Age: 34
DRG: 100 | End: 2019-01-01
Attending: HOSPITALIST
Payer: MEDICARE

## 2019-01-01 ENCOUNTER — HOSPITAL ENCOUNTER (INPATIENT)
Age: 34
LOS: 2 days | Discharge: HOSPICE | DRG: 100 | End: 2019-06-26
Attending: HOSPITALIST | Admitting: PSYCHIATRY & NEUROLOGY
Payer: MEDICARE

## 2019-01-01 VITALS
BODY MASS INDEX: 23.28 KG/M2 | OXYGEN SATURATION: 96 % | SYSTOLIC BLOOD PRESSURE: 136 MMHG | HEART RATE: 91 BPM | RESPIRATION RATE: 31 BRPM | HEIGHT: 72 IN | WEIGHT: 171.9 LBS | DIASTOLIC BLOOD PRESSURE: 70 MMHG | TEMPERATURE: 99.4 F

## 2019-01-01 LAB
ALBUMIN SERPL-MCNC: 3.3 G/DL (ref 3.5–5.2)
ALP BLD-CCNC: 75 U/L (ref 40–130)
ALT SERPL-CCNC: 11 U/L (ref 5–41)
ANION GAP SERPL CALCULATED.3IONS-SCNC: 14 MMOL/L (ref 7–19)
ANION GAP SERPL CALCULATED.3IONS-SCNC: 17 MMOL/L (ref 7–19)
ANION GAP SERPL CALCULATED.3IONS-SCNC: 17 MMOL/L (ref 7–19)
ANION GAP SERPL CALCULATED.3IONS-SCNC: 18 MMOL/L (ref 7–19)
ANION GAP SERPL CALCULATED.3IONS-SCNC: 20 MMOL/L (ref 7–19)
AST SERPL-CCNC: 23 U/L (ref 5–40)
BACTERIA: NEGATIVE /HPF
BANDED NEUTROPHILS RELATIVE PERCENT: 10 % (ref 0–5)
BASE EXCESS ARTERIAL: 6.5 MMOL/L (ref -2–2)
BASE EXCESS ARTERIAL: 8.3 MMOL/L (ref -2–2)
BASOPHILS ABSOLUTE: 0 K/UL (ref 0–0.2)
BASOPHILS RELATIVE PERCENT: 0 % (ref 0–1)
BILIRUB SERPL-MCNC: 0.7 MG/DL (ref 0.2–1.2)
BILIRUBIN URINE: ABNORMAL
BLOOD, URINE: ABNORMAL
BUN BLDV-MCNC: 69 MG/DL (ref 6–20)
BUN BLDV-MCNC: 70 MG/DL (ref 6–20)
BUN BLDV-MCNC: 74 MG/DL (ref 6–20)
BUN BLDV-MCNC: 75 MG/DL (ref 6–20)
BUN BLDV-MCNC: 77 MG/DL (ref 6–20)
CALCIUM SERPL-MCNC: 10.2 MG/DL (ref 8.6–10)
CALCIUM SERPL-MCNC: 10.3 MG/DL (ref 8.6–10)
CALCIUM SERPL-MCNC: 10.9 MG/DL (ref 8.6–10)
CALCIUM SERPL-MCNC: 10.9 MG/DL (ref 8.6–10)
CALCIUM SERPL-MCNC: 11.5 MG/DL (ref 8.6–10)
CARBOXYHEMOGLOBIN ARTERIAL: 1.8 % (ref 0–5)
CARBOXYHEMOGLOBIN ARTERIAL: 1.9 % (ref 0–5)
CHLORIDE BLD-SCNC: 87 MMOL/L (ref 98–111)
CHLORIDE BLD-SCNC: 90 MMOL/L (ref 98–111)
CHLORIDE BLD-SCNC: 92 MMOL/L (ref 98–111)
CLARITY: ABNORMAL
CO2: 26 MMOL/L (ref 22–29)
CO2: 28 MMOL/L (ref 22–29)
CO2: 29 MMOL/L (ref 22–29)
CO2: 29 MMOL/L (ref 22–29)
CO2: 30 MMOL/L (ref 22–29)
COLOR: YELLOW
CREAT SERPL-MCNC: 7.8 MG/DL (ref 0.5–1.2)
CREAT SERPL-MCNC: 8 MG/DL (ref 0.5–1.2)
CREAT SERPL-MCNC: 8.6 MG/DL (ref 0.5–1.2)
CREAT SERPL-MCNC: 8.8 MG/DL (ref 0.5–1.2)
CREAT SERPL-MCNC: 9 MG/DL (ref 0.5–1.2)
CULTURE, RESPIRATORY: ABNORMAL
CULTURE, RESPIRATORY: ABNORMAL
EOSINOPHILS ABSOLUTE: 0 K/UL (ref 0–0.6)
EOSINOPHILS RELATIVE PERCENT: 0 % (ref 0–5)
EPITHELIAL CELLS, UA: 5 /HPF (ref 0–5)
GFR NON-AFRICAN AMERICAN: 7
GFR NON-AFRICAN AMERICAN: 8
GFR NON-AFRICAN AMERICAN: 8
GLUCOSE BLD-MCNC: 151 MG/DL (ref 70–99)
GLUCOSE BLD-MCNC: 157 MG/DL (ref 70–99)
GLUCOSE BLD-MCNC: 170 MG/DL (ref 74–109)
GLUCOSE BLD-MCNC: 171 MG/DL (ref 70–99)
GLUCOSE BLD-MCNC: 172 MG/DL (ref 70–99)
GLUCOSE BLD-MCNC: 174 MG/DL (ref 70–99)
GLUCOSE BLD-MCNC: 175 MG/DL (ref 70–99)
GLUCOSE BLD-MCNC: 176 MG/DL (ref 70–99)
GLUCOSE BLD-MCNC: 178 MG/DL (ref 70–99)
GLUCOSE BLD-MCNC: 180 MG/DL (ref 70–99)
GLUCOSE BLD-MCNC: 181 MG/DL (ref 70–99)
GLUCOSE BLD-MCNC: 181 MG/DL (ref 74–109)
GLUCOSE BLD-MCNC: 196 MG/DL (ref 70–99)
GLUCOSE BLD-MCNC: 197 MG/DL (ref 70–99)
GLUCOSE BLD-MCNC: 199 MG/DL (ref 74–109)
GLUCOSE BLD-MCNC: 201 MG/DL (ref 74–109)
GLUCOSE BLD-MCNC: 218 MG/DL (ref 70–99)
GLUCOSE BLD-MCNC: 225 MG/DL (ref 70–99)
GLUCOSE BLD-MCNC: 232 MG/DL (ref 70–99)
GLUCOSE BLD-MCNC: 239 MG/DL (ref 70–99)
GLUCOSE BLD-MCNC: 353 MG/DL (ref 70–99)
GLUCOSE BLD-MCNC: 432 MG/DL (ref 70–99)
GLUCOSE BLD-MCNC: 468 MG/DL (ref 70–99)
GLUCOSE BLD-MCNC: 490 MG/DL (ref 74–109)
GLUCOSE BLD-MCNC: 502 MG/DL (ref 70–99)
GLUCOSE URINE: >=1000 MG/DL
GRAM STAIN RESULT: ABNORMAL
HCO3 ARTERIAL: 29.1 MMOL/L (ref 22–26)
HCO3 ARTERIAL: 31.8 MMOL/L (ref 22–26)
HCT VFR BLD CALC: 33.6 % (ref 42–52)
HEMOGLOBIN, ART, EXTENDED: 11.3 G/DL (ref 14–18)
HEMOGLOBIN, ART, EXTENDED: 12.3 G/DL (ref 14–18)
HEMOGLOBIN: 11.6 G/DL (ref 14–18)
HYALINE CASTS: 7 /HPF (ref 0–8)
KETONES, URINE: NEGATIVE MG/DL
LACTIC ACID: 2.8 MMOL/L (ref 0.5–1.9)
LEUKOCYTE ESTERASE, URINE: NEGATIVE
LYMPHOCYTES ABSOLUTE: 2.2 K/UL (ref 1.1–4.5)
LYMPHOCYTES RELATIVE PERCENT: 18 % (ref 20–40)
MAGNESIUM: 2 MG/DL (ref 1.6–2.6)
MAGNESIUM: 2.1 MG/DL (ref 1.6–2.6)
MAGNESIUM: 2.1 MG/DL (ref 1.6–2.6)
MAGNESIUM: 2.2 MG/DL (ref 1.6–2.6)
MAGNESIUM: 2.3 MG/DL (ref 1.6–2.6)
MCH RBC QN AUTO: 29.3 PG (ref 27–31)
MCHC RBC AUTO-ENTMCNC: 34.5 G/DL (ref 33–37)
MCV RBC AUTO: 84.8 FL (ref 80–94)
METHEMOGLOBIN ARTERIAL: 1 %
METHEMOGLOBIN ARTERIAL: 1.1 %
MONOCYTES ABSOLUTE: 0.4 K/UL (ref 0–0.9)
MONOCYTES RELATIVE PERCENT: 3 % (ref 0–10)
NEUTROPHILS ABSOLUTE: 9.7 K/UL (ref 1.5–7.5)
NEUTROPHILS RELATIVE PERCENT: 69 % (ref 50–65)
NITRITE, URINE: NEGATIVE
O2 CONTENT ARTERIAL: 15.4 ML/DL
O2 CONTENT ARTERIAL: 16.8 ML/DL
O2 SAT, ARTERIAL: 96.1 %
O2 SAT, ARTERIAL: 96.1 %
O2 THERAPY: ABNORMAL
O2 THERAPY: ABNORMAL
ORGANISM: ABNORMAL
PCO2 ARTERIAL: 34 MMHG (ref 35–45)
PCO2 ARTERIAL: 39 MMHG (ref 35–45)
PDW BLD-RTO: 14.4 % (ref 11.5–14.5)
PERFORMED ON: ABNORMAL
PH ARTERIAL: 7.52 (ref 7.35–7.45)
PH ARTERIAL: 7.54 (ref 7.35–7.45)
PH UA: 5.5 (ref 5–8)
PHOSPHORUS: 3 MG/DL (ref 2.5–4.5)
PHOSPHORUS: 3.5 MG/DL (ref 2.5–4.5)
PHOSPHORUS: 3.7 MG/DL (ref 2.5–4.5)
PHOSPHORUS: 3.8 MG/DL (ref 2.5–4.5)
PHOSPHORUS: 4 MG/DL (ref 2.5–4.5)
PLATELET # BLD: 111 K/UL (ref 130–400)
PLATELET SLIDE REVIEW: ADEQUATE
PMV BLD AUTO: 10.4 FL (ref 9.4–12.4)
PO2 ARTERIAL: 112 MMHG (ref 80–100)
PO2 ARTERIAL: 131 MMHG (ref 80–100)
POTASSIUM SERPL-SCNC: 4 MMOL/L (ref 3.5–5)
POTASSIUM SERPL-SCNC: 4.4 MMOL/L (ref 3.5–5)
POTASSIUM SERPL-SCNC: 4.5 MMOL/L (ref 3.5–5)
POTASSIUM SERPL-SCNC: 4.5 MMOL/L (ref 3.5–5)
POTASSIUM SERPL-SCNC: 4.6 MMOL/L (ref 3.5–5)
POTASSIUM, WHOLE BLOOD: 4.2
POTASSIUM, WHOLE BLOOD: 4.3
PROTEIN UA: >=1000 MG/DL
RBC # BLD: 3.96 M/UL (ref 4.7–6.1)
RBC # BLD: NORMAL 10*6/UL
RBC UA: 15 /HPF (ref 0–4)
SODIUM BLD-SCNC: 133 MMOL/L (ref 136–145)
SODIUM BLD-SCNC: 135 MMOL/L (ref 136–145)
SODIUM BLD-SCNC: 135 MMOL/L (ref 136–145)
SODIUM BLD-SCNC: 137 MMOL/L (ref 136–145)
SODIUM BLD-SCNC: 137 MMOL/L (ref 136–145)
SPECIFIC GRAVITY UA: 1.03 (ref 1–1.03)
TOTAL CK: 345 U/L (ref 39–308)
TOTAL PROTEIN: 6.5 G/DL (ref 6.6–8.7)
UROBILINOGEN, URINE: 0.2 E.U./DL
WBC # BLD: 12.3 K/UL (ref 4.8–10.8)
WBC UA: 11 /HPF (ref 0–5)

## 2019-01-01 PROCEDURE — 2700000000 HC OXYGEN THERAPY PER DAY

## 2019-01-01 PROCEDURE — 99238 HOSP IP/OBS DSCHRG MGMT 30/<: CPT | Performed by: HOSPITALIST

## 2019-01-01 PROCEDURE — 87070 CULTURE OTHR SPECIMN AEROBIC: CPT

## 2019-01-01 PROCEDURE — 83735 ASSAY OF MAGNESIUM: CPT

## 2019-01-01 PROCEDURE — 99291 CRITICAL CARE FIRST HOUR: CPT | Performed by: PSYCHIATRY & NEUROLOGY

## 2019-01-01 PROCEDURE — 95822 EEG COMA OR SLEEP ONLY: CPT

## 2019-01-01 PROCEDURE — 95951 HC EEG MONITOR/VIDEO LESS THAN 24: CPT

## 2019-01-01 PROCEDURE — 71045 X-RAY EXAM CHEST 1 VIEW: CPT

## 2019-01-01 PROCEDURE — 84100 ASSAY OF PHOSPHORUS: CPT

## 2019-01-01 PROCEDURE — 99291 CRITICAL CARE FIRST HOUR: CPT | Performed by: HOSPITALIST

## 2019-01-01 PROCEDURE — 87205 SMEAR GRAM STAIN: CPT

## 2019-01-01 PROCEDURE — 2500000003 HC RX 250 WO HCPCS: Performed by: HOSPITALIST

## 2019-01-01 PROCEDURE — 2000000000 HC ICU R&B

## 2019-01-01 PROCEDURE — 2580000003 HC RX 258: Performed by: HOSPITALIST

## 2019-01-01 PROCEDURE — 5A1D70Z PERFORMANCE OF URINARY FILTRATION, INTERMITTENT, LESS THAN 6 HOURS PER DAY: ICD-10-PCS | Performed by: INTERNAL MEDICINE

## 2019-01-01 PROCEDURE — 36600 WITHDRAWAL OF ARTERIAL BLOOD: CPT

## 2019-01-01 PROCEDURE — 95951 PR EEG MONITORING/VIDEORECORD: CPT | Performed by: PSYCHIATRY & NEUROLOGY

## 2019-01-01 PROCEDURE — 6370000000 HC RX 637 (ALT 250 FOR IP): Performed by: INTERNAL MEDICINE

## 2019-01-01 PROCEDURE — 6360000002 HC RX W HCPCS: Performed by: PSYCHIATRY & NEUROLOGY

## 2019-01-01 PROCEDURE — 80053 COMPREHEN METABOLIC PANEL: CPT

## 2019-01-01 PROCEDURE — 94002 VENT MGMT INPAT INIT DAY: CPT

## 2019-01-01 PROCEDURE — 87186 SC STD MICRODIL/AGAR DIL: CPT

## 2019-01-01 PROCEDURE — 84132 ASSAY OF SERUM POTASSIUM: CPT

## 2019-01-01 PROCEDURE — 6370000000 HC RX 637 (ALT 250 FOR IP): Performed by: HOSPITALIST

## 2019-01-01 PROCEDURE — 8010000000 HC HEMODIALYSIS ACUTE INPT

## 2019-01-01 PROCEDURE — 99233 SBSQ HOSP IP/OBS HIGH 50: CPT | Performed by: PSYCHIATRY & NEUROLOGY

## 2019-01-01 PROCEDURE — 36592 COLLECT BLOOD FROM PICC: CPT

## 2019-01-01 PROCEDURE — 87040 BLOOD CULTURE FOR BACTERIA: CPT

## 2019-01-01 PROCEDURE — 36415 COLL VENOUS BLD VENIPUNCTURE: CPT

## 2019-01-01 PROCEDURE — 82803 BLOOD GASES ANY COMBINATION: CPT

## 2019-01-01 PROCEDURE — 85025 COMPLETE CBC W/AUTO DIFF WBC: CPT

## 2019-01-01 PROCEDURE — 6360000002 HC RX W HCPCS: Performed by: HOSPITALIST

## 2019-01-01 PROCEDURE — 0BH17EZ INSERTION OF ENDOTRACHEAL AIRWAY INTO TRACHEA, VIA NATURAL OR ARTIFICIAL OPENING: ICD-10-PCS | Performed by: HOSPITALIST

## 2019-01-01 PROCEDURE — 82948 REAGENT STRIP/BLOOD GLUCOSE: CPT

## 2019-01-01 PROCEDURE — 81001 URINALYSIS AUTO W/SCOPE: CPT

## 2019-01-01 PROCEDURE — 83605 ASSAY OF LACTIC ACID: CPT

## 2019-01-01 PROCEDURE — 93005 ELECTROCARDIOGRAM TRACING: CPT

## 2019-01-01 PROCEDURE — 80048 BASIC METABOLIC PNL TOTAL CA: CPT

## 2019-01-01 PROCEDURE — 82550 ASSAY OF CK (CPK): CPT

## 2019-01-01 PROCEDURE — 94003 VENT MGMT INPAT SUBQ DAY: CPT

## 2019-01-01 PROCEDURE — 5A1945Z RESPIRATORY VENTILATION, 24-96 CONSECUTIVE HOURS: ICD-10-PCS | Performed by: HOSPITALIST

## 2019-01-01 PROCEDURE — 70450 CT HEAD/BRAIN W/O DYE: CPT

## 2019-01-01 PROCEDURE — 95822 EEG COMA OR SLEEP ONLY: CPT | Performed by: PSYCHIATRY & NEUROLOGY

## 2019-01-01 RX ORDER — DULOXETIN HYDROCHLORIDE 60 MG/1
60 CAPSULE, DELAYED RELEASE ORAL DAILY
Status: DISCONTINUED | OUTPATIENT
Start: 2019-01-01 | End: 2019-01-01

## 2019-01-01 RX ORDER — GABAPENTIN 300 MG/1
900 CAPSULE ORAL NIGHTLY
Status: DISCONTINUED | OUTPATIENT
Start: 2019-01-01 | End: 2019-01-01

## 2019-01-01 RX ORDER — SEVELAMER CARBONATE 800 MG/1
800 TABLET, FILM COATED ORAL
Status: DISCONTINUED | OUTPATIENT
Start: 2019-01-01 | End: 2019-01-01

## 2019-01-01 RX ORDER — DEXTROSE AND SODIUM CHLORIDE 5; .45 G/100ML; G/100ML
INJECTION, SOLUTION INTRAVENOUS CONTINUOUS PRN
Status: DISCONTINUED | OUTPATIENT
Start: 2019-01-01 | End: 2019-01-01 | Stop reason: HOSPADM

## 2019-01-01 RX ORDER — ENALAPRILAT 2.5 MG/2ML
0.62 INJECTION INTRAVENOUS EVERY 6 HOURS SCHEDULED
Status: DISCONTINUED | OUTPATIENT
Start: 2019-01-01 | End: 2019-01-01

## 2019-01-01 RX ORDER — DEXTROSE MONOHYDRATE 25 G/50ML
12.5 INJECTION, SOLUTION INTRAVENOUS PRN
Status: DISCONTINUED | OUTPATIENT
Start: 2019-01-01 | End: 2019-01-01 | Stop reason: HOSPADM

## 2019-01-01 RX ORDER — SODIUM CHLORIDE 9 MG/ML
INJECTION, SOLUTION INTRAVENOUS CONTINUOUS
Status: DISCONTINUED | OUTPATIENT
Start: 2019-01-01 | End: 2019-01-01

## 2019-01-01 RX ORDER — INSULIN GLARGINE 100 [IU]/ML
0.15 INJECTION, SOLUTION SUBCUTANEOUS NIGHTLY
Status: DISCONTINUED | OUTPATIENT
Start: 2019-01-01 | End: 2019-01-01

## 2019-01-01 RX ORDER — ACETAMINOPHEN 650 MG/1
650 SUPPOSITORY RECTAL EVERY 4 HOURS PRN
Status: DISCONTINUED | OUTPATIENT
Start: 2019-01-01 | End: 2019-01-01

## 2019-01-01 RX ORDER — LISINOPRIL 5 MG/1
5 TABLET ORAL DAILY
Status: DISCONTINUED | OUTPATIENT
Start: 2019-01-01 | End: 2019-01-01

## 2019-01-01 RX ORDER — LEVETIRACETAM 5 MG/ML
500 INJECTION INTRAVASCULAR EVERY 8 HOURS
Status: DISCONTINUED | OUTPATIENT
Start: 2019-01-01 | End: 2019-01-01 | Stop reason: HOSPADM

## 2019-01-01 RX ORDER — FAMOTIDINE 20 MG/1
20 TABLET, FILM COATED ORAL DAILY
Status: DISCONTINUED | OUTPATIENT
Start: 2019-01-01 | End: 2019-01-01

## 2019-01-01 RX ORDER — AMLODIPINE BESYLATE 5 MG/1
5 TABLET ORAL DAILY
Status: DISCONTINUED | OUTPATIENT
Start: 2019-01-01 | End: 2019-01-01

## 2019-01-01 RX ORDER — HYDRALAZINE HYDROCHLORIDE 25 MG/1
25 TABLET, FILM COATED ORAL EVERY 8 HOURS PRN
Status: DISCONTINUED | OUTPATIENT
Start: 2019-01-01 | End: 2019-01-01

## 2019-01-01 RX ORDER — AMITRIPTYLINE HYDROCHLORIDE 25 MG/1
25 TABLET, FILM COATED ORAL NIGHTLY
Status: DISCONTINUED | OUTPATIENT
Start: 2019-01-01 | End: 2019-01-01

## 2019-01-01 RX ORDER — FAMOTIDINE 20 MG/1
40 TABLET, FILM COATED ORAL 2 TIMES DAILY
Status: DISCONTINUED | OUTPATIENT
Start: 2019-01-01 | End: 2019-01-01

## 2019-01-01 RX ORDER — ACETAMINOPHEN 650 MG/1
650 SUPPOSITORY RECTAL ONCE
Status: COMPLETED | OUTPATIENT
Start: 2019-01-01 | End: 2019-01-01

## 2019-01-01 RX ORDER — CLOPIDOGREL BISULFATE 75 MG/1
75 TABLET ORAL DAILY
Status: DISCONTINUED | OUTPATIENT
Start: 2019-01-01 | End: 2019-01-01

## 2019-01-01 RX ORDER — LINEZOLID 2 MG/ML
600 INJECTION, SOLUTION INTRAVENOUS EVERY 12 HOURS
Status: DISCONTINUED | OUTPATIENT
Start: 2019-01-01 | End: 2019-01-01

## 2019-01-01 RX ORDER — INSULIN GLARGINE 100 [IU]/ML
15 INJECTION, SOLUTION SUBCUTANEOUS ONCE
Status: COMPLETED | OUTPATIENT
Start: 2019-01-01 | End: 2019-01-01

## 2019-01-01 RX ORDER — MAGNESIUM SULFATE 1 G/100ML
1 INJECTION INTRAVENOUS PRN
Status: DISCONTINUED | OUTPATIENT
Start: 2019-01-01 | End: 2019-01-01 | Stop reason: HOSPADM

## 2019-01-01 RX ORDER — INSULIN GLARGINE 100 [IU]/ML
15 INJECTION, SOLUTION SUBCUTANEOUS NIGHTLY
Status: DISCONTINUED | OUTPATIENT
Start: 2019-01-01 | End: 2019-01-01 | Stop reason: HOSPADM

## 2019-01-01 RX ORDER — LORAZEPAM 2 MG/ML
1 INJECTION INTRAMUSCULAR PRN
Status: DISCONTINUED | OUTPATIENT
Start: 2019-01-01 | End: 2019-01-01 | Stop reason: HOSPADM

## 2019-01-01 RX ORDER — GABAPENTIN 300 MG/1
300 CAPSULE ORAL 3 TIMES DAILY
Status: DISCONTINUED | OUTPATIENT
Start: 2019-01-01 | End: 2019-01-01

## 2019-01-01 RX ORDER — POTASSIUM CHLORIDE 7.45 MG/ML
10 INJECTION INTRAVENOUS PRN
Status: DISCONTINUED | OUTPATIENT
Start: 2019-01-01 | End: 2019-01-01 | Stop reason: HOSPADM

## 2019-01-01 RX ADMIN — DAPTOMYCIN 450 MG: 500 INJECTION, POWDER, LYOPHILIZED, FOR SOLUTION INTRAVENOUS at 22:12

## 2019-01-01 RX ADMIN — INSULIN GLARGINE 15 UNITS: 100 INJECTION, SOLUTION SUBCUTANEOUS at 21:03

## 2019-01-01 RX ADMIN — LEVETIRACETAM 500 MG: 5 INJECTION INTRAVENOUS at 03:30

## 2019-01-01 RX ADMIN — MIDAZOLAM 2 MG/HR: 5 INJECTION INTRAMUSCULAR; INTRAVENOUS at 16:45

## 2019-01-01 RX ADMIN — INSULIN LISPRO 1 UNITS: 100 INJECTION, SOLUTION INTRAVENOUS; SUBCUTANEOUS at 21:04

## 2019-01-01 RX ADMIN — INSULIN LISPRO 4 UNITS: 100 INJECTION, SOLUTION INTRAVENOUS; SUBCUTANEOUS at 03:24

## 2019-01-01 RX ADMIN — LEVETIRACETAM 500 MG: 5 INJECTION INTRAVENOUS at 12:00

## 2019-01-01 RX ADMIN — MORPHINE SULFATE 0.5 MG/HR: 10 INJECTION INTRAVENOUS at 20:15

## 2019-01-01 RX ADMIN — FAMOTIDINE 20 MG: 10 INJECTION, SOLUTION INTRAVENOUS at 09:44

## 2019-01-01 RX ADMIN — LEVETIRACETAM 500 MG: 5 INJECTION INTRAVENOUS at 19:39

## 2019-01-01 RX ADMIN — INSULIN GLARGINE 15 UNITS: 100 INJECTION, SOLUTION SUBCUTANEOUS at 10:22

## 2019-01-01 RX ADMIN — DEXTROSE MONOHYDRATE 12 MG/HR: 50 INJECTION, SOLUTION INTRAVENOUS at 10:22

## 2019-01-01 RX ADMIN — GABAPENTIN 900 MG: 300 CAPSULE ORAL at 21:22

## 2019-01-01 RX ADMIN — DEXTROSE AND SODIUM CHLORIDE: 5; 450 INJECTION, SOLUTION INTRAVENOUS at 04:28

## 2019-01-01 RX ADMIN — DEXTROSE MONOHYDRATE 6 MG/HR: 50 INJECTION, SOLUTION INTRAVENOUS at 19:29

## 2019-01-01 RX ADMIN — INSULIN LISPRO 4 UNITS: 100 INJECTION, SOLUTION INTRAVENOUS; SUBCUTANEOUS at 15:13

## 2019-01-01 RX ADMIN — ENALAPRILAT 0.62 MG: 2.5 INJECTION INTRAVENOUS at 11:59

## 2019-01-01 RX ADMIN — LORAZEPAM 1 MG: 2 INJECTION INTRAMUSCULAR; INTRAVENOUS at 03:16

## 2019-01-01 RX ADMIN — DEXTROSE MONOHYDRATE 12 MG/HR: 50 INJECTION, SOLUTION INTRAVENOUS at 12:30

## 2019-01-01 RX ADMIN — LEVETIRACETAM 500 MG: 5 INJECTION INTRAVENOUS at 21:19

## 2019-01-01 RX ADMIN — LEVETIRACETAM 500 MG: 5 INJECTION INTRAVENOUS at 03:45

## 2019-01-01 RX ADMIN — DEXTROSE MONOHYDRATE 5 MG/HR: 50 INJECTION, SOLUTION INTRAVENOUS at 16:40

## 2019-01-01 RX ADMIN — DEXTROSE MONOHYDRATE 10 MG/HR: 50 INJECTION, SOLUTION INTRAVENOUS at 15:07

## 2019-01-01 RX ADMIN — FAMOTIDINE 20 MG: 10 INJECTION, SOLUTION INTRAVENOUS at 09:30

## 2019-01-01 RX ADMIN — ENALAPRILAT 0.62 MG: 2.5 INJECTION INTRAVENOUS at 08:03

## 2019-01-01 RX ADMIN — SODIUM CHLORIDE: 9 INJECTION, SOLUTION INTRAVENOUS at 16:50

## 2019-01-01 RX ADMIN — ENOXAPARIN SODIUM 40 MG: 40 INJECTION SUBCUTANEOUS at 16:15

## 2019-01-01 RX ADMIN — DEXTROSE AND SODIUM CHLORIDE: 5; 450 INJECTION, SOLUTION INTRAVENOUS at 21:16

## 2019-01-01 RX ADMIN — ACETAMINOPHEN 650 MG: 650 SUPPOSITORY RECTAL at 06:12

## 2019-01-01 RX ADMIN — DEXTROSE MONOHYDRATE 15 MG/HR: 50 INJECTION, SOLUTION INTRAVENOUS at 08:03

## 2019-01-01 RX ADMIN — SODIUM CHLORIDE 0.1 UNITS/KG/HR: 9 INJECTION, SOLUTION INTRAVENOUS at 16:45

## 2019-01-01 RX ADMIN — ENOXAPARIN SODIUM 30 MG: 30 INJECTION SUBCUTANEOUS at 15:10

## 2019-01-01 ASSESSMENT — PULMONARY FUNCTION TESTS
PIF_VALUE: 24.4
PIF_VALUE: 24.9
PIF_VALUE: 22.3
PIF_VALUE: 16.9
PIF_VALUE: 17.4
PIF_VALUE: 6.8
PIF_VALUE: 24.7
PIF_VALUE: 24.1
PIF_VALUE: 36.3
PIF_VALUE: 22.7
PIF_VALUE: 25.1
PIF_VALUE: 27.5
PIF_VALUE: 23.8
PIF_VALUE: 22.8
PIF_VALUE: 22.7
PIF_VALUE: 24.6
PIF_VALUE: 14.2
PIF_VALUE: 22.4
PIF_VALUE: 23.9
PIF_VALUE: 7.7
PIF_VALUE: 23.7
PIF_VALUE: 24.9
PIF_VALUE: 23.9
PIF_VALUE: 23.8
PIF_VALUE: 23.3
PIF_VALUE: 25.2
PIF_VALUE: 23.4
PIF_VALUE: 23.4
PIF_VALUE: 25.7
PIF_VALUE: 23.3
PIF_VALUE: 23.4
PIF_VALUE: 23.6
PIF_VALUE: 24.9
PIF_VALUE: 20.8
PIF_VALUE: 23.9
PIF_VALUE: 20.5
PIF_VALUE: 22.9
PIF_VALUE: 21.2
PIF_VALUE: 23
PIF_VALUE: 26
PIF_VALUE: 6.1
PIF_VALUE: 27

## 2019-01-01 ASSESSMENT — PAIN SCALES - GENERAL
PAINLEVEL_OUTOF10: 0

## 2019-01-28 ENCOUNTER — TELEPHONE (OUTPATIENT)
Dept: ENDOCRINOLOGY | Facility: CLINIC | Age: 34
End: 2019-01-28

## 2019-02-13 ENCOUNTER — LAB REQUISITION (OUTPATIENT)
Dept: LAB | Facility: HOSPITAL | Age: 34
End: 2019-02-13

## 2019-02-13 DIAGNOSIS — Z00.00 ENCOUNTER FOR GENERAL ADULT MEDICAL EXAMINATION WITHOUT ABNORMAL FINDINGS: ICD-10-CM

## 2019-02-13 LAB
ALBUMIN SERPL-MCNC: 3.1 G/DL (ref 3.5–5)
ALBUMIN/GLOB SERPL: 1.1 G/DL (ref 1.1–2.5)
ALP SERPL-CCNC: 100 U/L (ref 24–120)
ALT SERPL W P-5'-P-CCNC: 23 U/L (ref 0–54)
ANION GAP SERPL CALCULATED.3IONS-SCNC: 5 MMOL/L (ref 4–13)
AST SERPL-CCNC: 30 U/L (ref 7–45)
BILIRUB SERPL-MCNC: 0.7 MG/DL (ref 0.1–1)
BUN BLD-MCNC: 37 MG/DL (ref 5–21)
BUN/CREAT SERPL: 13.7 (ref 7–25)
CALCIUM SPEC-SCNC: 9.9 MG/DL (ref 8.4–10.4)
CHLORIDE SERPL-SCNC: 101 MMOL/L (ref 98–110)
CO2 SERPL-SCNC: 31 MMOL/L (ref 24–31)
CREAT BLD-MCNC: 2.7 MG/DL (ref 0.5–1.4)
FERRITIN SERPL-MCNC: 384 NG/ML (ref 17.9–464)
FOLATE SERPL-MCNC: 13 NG/ML
GFR SERPL CREATININE-BSD FRML MDRD: 27 ML/MIN/1.73
GLOBULIN UR ELPH-MCNC: 2.9 GM/DL
GLUCOSE BLD-MCNC: 142 MG/DL (ref 70–100)
IRON 24H UR-MRATE: 71 MCG/DL (ref 42–180)
IRON SATN MFR SERPL: 28 % (ref 20–45)
LDH SERPL-CCNC: 1056 U/L (ref 265–665)
POTASSIUM BLD-SCNC: 4.2 MMOL/L (ref 3.5–5.3)
PROT SERPL-MCNC: 6 G/DL (ref 6.3–8.7)
SODIUM BLD-SCNC: 137 MMOL/L (ref 135–145)
TIBC SERPL-MCNC: 258 MCG/DL (ref 225–420)
VIT B12 BLD-MCNC: 579 PG/ML (ref 239–931)

## 2019-02-13 PROCEDURE — 83550 IRON BINDING TEST: CPT | Performed by: INTERNAL MEDICINE

## 2019-02-13 PROCEDURE — 83540 ASSAY OF IRON: CPT | Performed by: INTERNAL MEDICINE

## 2019-02-13 PROCEDURE — 80053 COMPREHEN METABOLIC PANEL: CPT | Performed by: INTERNAL MEDICINE

## 2019-02-13 PROCEDURE — 82728 ASSAY OF FERRITIN: CPT | Performed by: INTERNAL MEDICINE

## 2019-02-13 PROCEDURE — 82607 VITAMIN B-12: CPT | Performed by: INTERNAL MEDICINE

## 2019-02-13 PROCEDURE — 82746 ASSAY OF FOLIC ACID SERUM: CPT | Performed by: INTERNAL MEDICINE

## 2019-02-13 PROCEDURE — 83615 LACTATE (LD) (LDH) ENZYME: CPT | Performed by: INTERNAL MEDICINE

## 2019-02-13 PROCEDURE — 84165 PROTEIN E-PHORESIS SERUM: CPT | Performed by: INTERNAL MEDICINE

## 2019-02-15 LAB
ALBUMIN SERPL-MCNC: 2.7 G/DL (ref 2.9–4.4)
ALBUMIN/GLOB SERPL: 0.8 {RATIO} (ref 0.7–1.7)
ALPHA1 GLOB FLD ELPH-MCNC: 0.4 G/DL (ref 0–0.4)
ALPHA2 GLOB SERPL ELPH-MCNC: 0.9 G/DL (ref 0.4–1)
B-GLOBULIN SERPL ELPH-MCNC: 1.1 G/DL (ref 0.7–1.3)
GAMMA GLOB SERPL ELPH-MCNC: 0.9 G/DL (ref 0.4–1.8)
GLOBULIN SER CALC-MCNC: 3.3 G/DL (ref 2.2–3.9)
Lab: ABNORMAL
M-SPIKE: ABNORMAL G/DL
PROT PATTERN SERPL ELPH-IMP: ABNORMAL
PROT SERPL-MCNC: 6 G/DL (ref 6–8.5)

## 2019-02-22 ENCOUNTER — TRANSCRIBE ORDERS (OUTPATIENT)
Dept: ADMINISTRATIVE | Facility: HOSPITAL | Age: 34
End: 2019-02-22

## 2019-02-22 DIAGNOSIS — D47.3 ESSENTIAL (HEMORRHAGIC) THROMBOCYTHEMIA (HCC): Primary | ICD-10-CM

## 2019-02-23 ENCOUNTER — TRANSCRIBE ORDERS (OUTPATIENT)
Dept: ADMINISTRATIVE | Facility: HOSPITAL | Age: 34
End: 2019-02-23

## 2019-02-23 DIAGNOSIS — D64.9 NORMOCYTIC ANEMIA: Primary | ICD-10-CM

## 2019-03-01 ENCOUNTER — HOSPITAL ENCOUNTER (OUTPATIENT)
Dept: CT IMAGING | Facility: HOSPITAL | Age: 34
Discharge: HOME | End: 2019-03-01
Admitting: INTERNAL MEDICINE

## 2019-03-01 VITALS
SYSTOLIC BLOOD PRESSURE: 174 MMHG | DIASTOLIC BLOOD PRESSURE: 111 MMHG | HEIGHT: 74 IN | RESPIRATION RATE: 11 BRPM | HEART RATE: 105 BPM | WEIGHT: 181.5 LBS | TEMPERATURE: 98.8 F | BODY MASS INDEX: 23.29 KG/M2 | OXYGEN SATURATION: 100 %

## 2019-03-01 DIAGNOSIS — D47.3 ESSENTIAL (HEMORRHAGIC) THROMBOCYTHEMIA (HCC): ICD-10-CM

## 2019-03-01 LAB
APTT PPP: 29.2 SECONDS (ref 24.1–34.8)
INR PPP: 0.87 (ref 0.91–1.09)
PLATELET # BLD AUTO: 140 10*3/MM3 (ref 130–400)
PROTHROMBIN TIME: 12.1 SECONDS (ref 11.9–14.6)

## 2019-03-01 PROCEDURE — 85730 THROMBOPLASTIN TIME PARTIAL: CPT | Performed by: INTERNAL MEDICINE

## 2019-03-01 PROCEDURE — 85610 PROTHROMBIN TIME: CPT | Performed by: INTERNAL MEDICINE

## 2019-03-01 PROCEDURE — 85049 AUTOMATED PLATELET COUNT: CPT | Performed by: INTERNAL MEDICINE

## 2019-03-01 RX ORDER — ATORVASTATIN CALCIUM 80 MG/1
80 TABLET, FILM COATED ORAL DAILY
COMMUNITY

## 2019-03-01 RX ORDER — SODIUM CHLORIDE 0.9 % (FLUSH) 0.9 %
3 SYRINGE (ML) INJECTION EVERY 12 HOURS SCHEDULED
Status: DISCONTINUED | OUTPATIENT
Start: 2019-03-01 | End: 2019-03-02 | Stop reason: HOSPADM

## 2019-03-01 RX ORDER — METOCLOPRAMIDE 10 MG/1
10 TABLET ORAL
COMMUNITY

## 2019-03-01 RX ORDER — SODIUM CHLORIDE 0.9 % (FLUSH) 0.9 %
3-10 SYRINGE (ML) INJECTION AS NEEDED
Status: DISCONTINUED | OUTPATIENT
Start: 2019-03-01 | End: 2019-03-02 | Stop reason: HOSPADM

## 2019-03-01 RX ORDER — AMLODIPINE BESYLATE 5 MG/1
5 TABLET ORAL DAILY
COMMUNITY

## 2019-03-01 NOTE — NURSING NOTE
Patient refused to go to er advised of risk of leaving such as stroke continued to refuse states will just go to dr office. Teri at [facility] notified

## 2019-03-01 NOTE — NURSING NOTE
Dr Oropeza advised of  Patient's continued elevated blood pressure 175/111 and orders received to cancel procedure and notify primary md

## 2019-03-01 NOTE — NURSING NOTE
Spoke with DMITRIY Lopez regarding pt's elevated BP. Notified that pt did not take his morning BP medication. DMITRIY Lopez spoke with radiologist regarding pt's elevated BP, radiologist requesting pt take his morning BP medication. Pt provided a sip of water to take his medication.

## 2019-03-01 NOTE — NURSING NOTE
Spoke with Teri at [facility] office and advised of blood pressure of 175/111 and advised to have patient come to office

## 2019-03-10 ENCOUNTER — APPOINTMENT (OUTPATIENT)
Dept: CT IMAGING | Facility: HOSPITAL | Age: 34
End: 2019-03-10

## 2019-03-10 ENCOUNTER — APPOINTMENT (OUTPATIENT)
Dept: GENERAL RADIOLOGY | Facility: HOSPITAL | Age: 34
End: 2019-03-10

## 2019-03-10 ENCOUNTER — APPOINTMENT (OUTPATIENT)
Dept: CARDIOLOGY | Facility: HOSPITAL | Age: 34
End: 2019-03-10

## 2019-03-10 ENCOUNTER — HOSPITAL ENCOUNTER (INPATIENT)
Facility: HOSPITAL | Age: 34
LOS: 9 days | Discharge: HOME | End: 2019-03-19
Attending: EMERGENCY MEDICINE | Admitting: INTERNAL MEDICINE

## 2019-03-10 DIAGNOSIS — E87.5 HYPERKALEMIA: ICD-10-CM

## 2019-03-10 DIAGNOSIS — D64.9 ANEMIA, UNSPECIFIED TYPE: ICD-10-CM

## 2019-03-10 DIAGNOSIS — R13.10 DYSPHAGIA, UNSPECIFIED TYPE: ICD-10-CM

## 2019-03-10 DIAGNOSIS — J96.01 ACUTE RESPIRATORY FAILURE WITH HYPOXIA AND HYPERCAPNIA (HCC): Primary | ICD-10-CM

## 2019-03-10 DIAGNOSIS — N17.9 ACUTE KIDNEY INJURY (HCC): ICD-10-CM

## 2019-03-10 DIAGNOSIS — J96.02 ACUTE RESPIRATORY FAILURE WITH HYPOXIA AND HYPERCAPNIA (HCC): Primary | ICD-10-CM

## 2019-03-10 DIAGNOSIS — N18.9 CHRONIC KIDNEY DISEASE, UNSPECIFIED CKD STAGE: ICD-10-CM

## 2019-03-10 DIAGNOSIS — E87.70 HYPERVOLEMIA, UNSPECIFIED HYPERVOLEMIA TYPE: ICD-10-CM

## 2019-03-10 PROBLEM — Z87.01 HISTORY OF RECENT PNEUMONIA: Status: ACTIVE | Noted: 2019-03-10

## 2019-03-10 PROBLEM — J96.00 ACUTE RESPIRATORY FAILURE (HCC): Status: ACTIVE | Noted: 2019-03-10

## 2019-03-10 PROBLEM — I50.9 ACUTE CONGESTIVE HEART FAILURE (HCC): Status: ACTIVE | Noted: 2019-03-10

## 2019-03-10 LAB
ABO GROUP BLD: NORMAL
ALBUMIN SERPL-MCNC: 3.4 G/DL (ref 3.5–5)
ALBUMIN/GLOB SERPL: 1.1 G/DL (ref 1.1–2.5)
ALP SERPL-CCNC: 94 U/L (ref 24–120)
ALT SERPL W P-5'-P-CCNC: 34 U/L (ref 0–54)
ANION GAP SERPL CALCULATED.3IONS-SCNC: 7 MMOL/L (ref 4–13)
ANION GAP SERPL CALCULATED.3IONS-SCNC: 9 MMOL/L (ref 4–13)
APTT PPP: 30.7 SECONDS (ref 24.1–34.8)
ARTERIAL PATENCY WRIST A: POSITIVE
AST SERPL-CCNC: 49 U/L (ref 7–45)
ATMOSPHERIC PRESS: 751 MMHG
ATMOSPHERIC PRESS: 754 MMHG
ATMOSPHERIC PRESS: 755 MMHG
BACTERIA UR QL AUTO: ABNORMAL /HPF
BASE EXCESS BLDA CALC-SCNC: -2.8 MMOL/L (ref 0–2)
BASE EXCESS BLDA CALC-SCNC: -5.6 MMOL/L (ref 0–2)
BASE EXCESS BLDA CALC-SCNC: -5.7 MMOL/L (ref 0–2)
BASOPHILS # BLD AUTO: 0.11 10*3/MM3 (ref 0–0.2)
BASOPHILS NFR BLD AUTO: 1 % (ref 0–2)
BDY SITE: ABNORMAL
BH CV ECHO MEAS - AO MAX PG (FULL): 2.6 MMHG
BH CV ECHO MEAS - AO MAX PG: 8.5 MMHG
BH CV ECHO MEAS - AO MEAN PG (FULL): 2 MMHG
BH CV ECHO MEAS - AO MEAN PG: 5 MMHG
BH CV ECHO MEAS - AO ROOT AREA (BSA CORRECTED): 1.4
BH CV ECHO MEAS - AO ROOT AREA: 6.6 CM^2
BH CV ECHO MEAS - AO ROOT DIAM: 2.9 CM
BH CV ECHO MEAS - AO V2 MAX: 146 CM/SEC
BH CV ECHO MEAS - AO V2 MEAN: 102 CM/SEC
BH CV ECHO MEAS - AO V2 VTI: 32.5 CM
BH CV ECHO MEAS - AVA(I,A): 2.3 CM^2
BH CV ECHO MEAS - AVA(I,D): 2.3 CM^2
BH CV ECHO MEAS - AVA(V,A): 2.6 CM^2
BH CV ECHO MEAS - AVA(V,D): 2.6 CM^2
BH CV ECHO MEAS - BSA(HAYCOCK): 2.2 M^2
BH CV ECHO MEAS - BSA: 2.1 M^2
BH CV ECHO MEAS - BZI_BMI: 30.4 KILOGRAMS/M^2
BH CV ECHO MEAS - BZI_METRIC_HEIGHT: 177.8 CM
BH CV ECHO MEAS - BZI_METRIC_WEIGHT: 96.2 KG
BH CV ECHO MEAS - EDV(CUBED): 146.4 ML
BH CV ECHO MEAS - EDV(MOD-SP4): 150 ML
BH CV ECHO MEAS - EDV(TEICH): 133.6 ML
BH CV ECHO MEAS - EF(CUBED): 48.3 %
BH CV ECHO MEAS - EF(MOD-SP4): 50.9 %
BH CV ECHO MEAS - EF(TEICH): 40.2 %
BH CV ECHO MEAS - ESV(CUBED): 75.7 ML
BH CV ECHO MEAS - ESV(MOD-SP4): 73.6 ML
BH CV ECHO MEAS - ESV(TEICH): 79.9 ML
BH CV ECHO MEAS - FS: 19.7 %
BH CV ECHO MEAS - IVS/LVPW: 0.87
BH CV ECHO MEAS - IVSD: 1.2 CM
BH CV ECHO MEAS - LA DIMENSION: 4.4 CM
BH CV ECHO MEAS - LA/AO: 1.5
BH CV ECHO MEAS - LAT PEAK E' VEL: 7.2 CM/SEC
BH CV ECHO MEAS - LV DIASTOLIC VOL/BSA (35-75): 70.1 ML/M^2
BH CV ECHO MEAS - LV MASS(C)D: 264.6 GRAMS
BH CV ECHO MEAS - LV MASS(C)DI: 123.7 GRAMS/M^2
BH CV ECHO MEAS - LV MAX PG: 6 MMHG
BH CV ECHO MEAS - LV MEAN PG: 3 MMHG
BH CV ECHO MEAS - LV SYSTOLIC VOL/BSA (12-30): 34.4 ML/M^2
BH CV ECHO MEAS - LV V1 MAX: 122 CM/SEC
BH CV ECHO MEAS - LV V1 MEAN: 66.6 CM/SEC
BH CV ECHO MEAS - LV V1 VTI: 24.3 CM
BH CV ECHO MEAS - LVIDD: 5.3 CM
BH CV ECHO MEAS - LVIDS: 4.2 CM
BH CV ECHO MEAS - LVLD AP4: 9.3 CM
BH CV ECHO MEAS - LVLS AP4: 8.9 CM
BH CV ECHO MEAS - LVOT AREA (M): 3.1 CM^2
BH CV ECHO MEAS - LVOT AREA: 3.1 CM^2
BH CV ECHO MEAS - LVOT DIAM: 2 CM
BH CV ECHO MEAS - LVPWD: 1.3 CM
BH CV ECHO MEAS - MED PEAK E' VEL: 6.5 CM/SEC
BH CV ECHO MEAS - MR MAX PG: 81 MMHG
BH CV ECHO MEAS - MR MAX VEL: 450 CM/SEC
BH CV ECHO MEAS - MV DEC SLOPE: 378 CM/SEC^2
BH CV ECHO MEAS - MV DEC TIME: 0.22 SEC
BH CV ECHO MEAS - MV E MAX VEL: 116 CM/SEC
BH CV ECHO MEAS - MV P1/2T MAX VEL: 147 CM/SEC
BH CV ECHO MEAS - MV P1/2T: 113.9 MSEC
BH CV ECHO MEAS - MVA P1/2T LCG: 1.5 CM^2
BH CV ECHO MEAS - MVA(P1/2T): 1.9 CM^2
BH CV ECHO MEAS - RAP SYSTOLE: 10 MMHG
BH CV ECHO MEAS - RVSP: 55 MMHG
BH CV ECHO MEAS - SI(AO): 100.3 ML/M^2
BH CV ECHO MEAS - SI(CUBED): 33 ML/M^2
BH CV ECHO MEAS - SI(LVOT): 35.7 ML/M^2
BH CV ECHO MEAS - SI(MOD-SP4): 35.7 ML/M^2
BH CV ECHO MEAS - SI(TEICH): 25.1 ML/M^2
BH CV ECHO MEAS - SV(AO): 214.7 ML
BH CV ECHO MEAS - SV(CUBED): 70.7 ML
BH CV ECHO MEAS - SV(LVOT): 76.3 ML
BH CV ECHO MEAS - SV(MOD-SP4): 76.4 ML
BH CV ECHO MEAS - SV(TEICH): 53.7 ML
BH CV ECHO MEAS - TR MAX VEL: 334 CM/SEC
BH CV ECHO MEASUREMENTS AVERAGE E/E' RATIO: 16.93
BILIRUB SERPL-MCNC: 0.5 MG/DL (ref 0.1–1)
BILIRUB UR QL STRIP: NEGATIVE
BLD GP AB SCN SERPL QL: NEGATIVE
BODY TEMPERATURE: 37 C
BUN BLD-MCNC: 66 MG/DL (ref 5–21)
BUN BLD-MCNC: 66 MG/DL (ref 5–21)
BUN/CREAT SERPL: 15.1 (ref 7–25)
BUN/CREAT SERPL: 15.8 (ref 7–25)
CALCIUM SPEC-SCNC: 8.4 MG/DL (ref 8.4–10.4)
CALCIUM SPEC-SCNC: 8.9 MG/DL (ref 8.4–10.4)
CHLORIDE SERPL-SCNC: 108 MMOL/L (ref 98–110)
CHLORIDE SERPL-SCNC: 109 MMOL/L (ref 98–110)
CLARITY UR: CLEAR
CO2 SERPL-SCNC: 23 MMOL/L (ref 24–31)
CO2 SERPL-SCNC: 23 MMOL/L (ref 24–31)
COLOR UR: YELLOW
CREAT BLD-MCNC: 4.19 MG/DL (ref 0.5–1.4)
CREAT BLD-MCNC: 4.37 MG/DL (ref 0.5–1.4)
D-LACTATE SERPL-SCNC: 0.9 MMOL/L (ref 0.5–2)
DEPRECATED RDW RBC AUTO: 58 FL (ref 40–54)
EOSINOPHIL # BLD AUTO: 0.28 10*3/MM3 (ref 0–0.7)
EOSINOPHIL NFR BLD AUTO: 2.5 % (ref 0–4)
ERYTHROCYTE [DISTWIDTH] IN BLOOD BY AUTOMATED COUNT: 17.2 % (ref 12–15)
GFR SERPL CREATININE-BSD FRML MDRD: 16 ML/MIN/1.73
GFR SERPL CREATININE-BSD FRML MDRD: 16 ML/MIN/1.73
GLOBULIN UR ELPH-MCNC: 3.1 GM/DL
GLUCOSE BLD-MCNC: 151 MG/DL (ref 70–100)
GLUCOSE BLD-MCNC: 182 MG/DL (ref 70–100)
GLUCOSE BLDC GLUCOMTR-MCNC: 137 MG/DL (ref 70–130)
GLUCOSE BLDC GLUCOMTR-MCNC: 157 MG/DL (ref 70–130)
GLUCOSE BLDC GLUCOMTR-MCNC: 197 MG/DL (ref 70–130)
GLUCOSE BLDC GLUCOMTR-MCNC: 221 MG/DL (ref 70–130)
GLUCOSE UR STRIP-MCNC: ABNORMAL MG/DL
HCO3 BLDA-SCNC: 20.6 MMOL/L (ref 20–26)
HCO3 BLDA-SCNC: 22 MMOL/L (ref 20–26)
HCO3 BLDA-SCNC: 22.5 MMOL/L (ref 20–26)
HCT VFR BLD AUTO: 35.3 % (ref 40–52)
HGB BLD-MCNC: 11.4 G/DL (ref 14–18)
HGB UR QL STRIP.AUTO: ABNORMAL
HOLD SPECIMEN: NORMAL
HOROWITZ INDEX BLD+IHG-RTO: 100 %
HOROWITZ INDEX BLD+IHG-RTO: 100 %
HOROWITZ INDEX BLD+IHG-RTO: 80 %
HYALINE CASTS UR QL AUTO: ABNORMAL /LPF
IMM GRANULOCYTES # BLD AUTO: 0.03 10*3/MM3 (ref 0–0.05)
IMM GRANULOCYTES NFR BLD AUTO: 0.3 % (ref 0–5)
INR PPP: 0.92 (ref 0.91–1.09)
KETONES UR QL STRIP: NEGATIVE
L PNEUMO1 AG UR QL IA: NEGATIVE
LEFT ATRIUM VOLUME INDEX: 40 ML/M2
LEUKOCYTE ESTERASE UR QL STRIP.AUTO: NEGATIVE
LYMPHOCYTES # BLD AUTO: 3.58 10*3/MM3 (ref 0.72–4.86)
LYMPHOCYTES NFR BLD AUTO: 31.5 % (ref 15–45)
Lab: ABNORMAL
MAGNESIUM SERPL-MCNC: 2.2 MG/DL (ref 1.4–2.2)
MAXIMAL PREDICTED HEART RATE: 187 BPM
MCH RBC QN AUTO: 30.1 PG (ref 28–32)
MCHC RBC AUTO-ENTMCNC: 32.3 G/DL (ref 33–36)
MCV RBC AUTO: 93.1 FL (ref 82–95)
MODALITY: ABNORMAL
MONOCYTES # BLD AUTO: 0.53 10*3/MM3 (ref 0.19–1.3)
MONOCYTES NFR BLD AUTO: 4.7 % (ref 4–12)
NEUTROPHILS # BLD AUTO: 6.82 10*3/MM3 (ref 1.87–8.4)
NEUTROPHILS NFR BLD AUTO: 60 % (ref 39–78)
NITRITE UR QL STRIP: NEGATIVE
NRBC BLD AUTO-RTO: 0 /100 WBC (ref 0–0)
NT-PROBNP SERPL-MCNC: ABNORMAL PG/ML (ref 0–450)
PCO2 BLDA: 40.4 MM HG (ref 35–45)
PCO2 BLDA: 42.5 MM HG (ref 35–45)
PCO2 BLDA: 53 MM HG (ref 35–45)
PEEP RESPIRATORY: 8 CM[H2O]
PH BLDA: 7.23 PH UNITS (ref 7.35–7.45)
PH BLDA: 7.29 PH UNITS (ref 7.35–7.45)
PH BLDA: 7.36 PH UNITS (ref 7.35–7.45)
PH UR STRIP.AUTO: 5.5 [PH] (ref 5–8)
PHOSPHATE SERPL-MCNC: 6.4 MG/DL (ref 2.5–4.5)
PLATELET # BLD AUTO: 234 10*3/MM3 (ref 130–400)
PMV BLD AUTO: 10.4 FL (ref 6–12)
PO2 BLDA: 231 MM HG (ref 83–108)
PO2 BLDA: 77.2 MM HG (ref 83–108)
PO2 BLDA: 90.2 MM HG (ref 83–108)
POTASSIUM BLD-SCNC: 5.1 MMOL/L (ref 3.5–5.3)
POTASSIUM BLD-SCNC: 5.8 MMOL/L (ref 3.5–5.3)
PROCALCITONIN SERPL-MCNC: <0.25 NG/ML
PROT SERPL-MCNC: 6.5 G/DL (ref 6.3–8.7)
PROT UR QL STRIP: ABNORMAL
PROTHROMBIN TIME: 12.6 SECONDS (ref 11.9–14.6)
RBC # BLD AUTO: 3.79 10*6/MM3 (ref 4.8–5.9)
RBC # UR: ABNORMAL /HPF
REF LAB TEST METHOD: ABNORMAL
RH BLD: POSITIVE
S PNEUM AG SPEC QL LA: NEGATIVE
SAO2 % BLDCOA: 93.1 % (ref 94–99)
SAO2 % BLDCOA: 97.1 % (ref 94–99)
SAO2 % BLDCOA: >100.1 % (ref 94–99)
SET MECH RESP RATE: 16
SODIUM BLD-SCNC: 139 MMOL/L (ref 135–145)
SODIUM BLD-SCNC: 140 MMOL/L (ref 135–145)
SP GR UR STRIP: 1.02 (ref 1–1.03)
SQUAMOUS #/AREA URNS HPF: ABNORMAL /HPF
STRESS TARGET HR: 159 BPM
T&S EXPIRATION DATE: NORMAL
TROPONIN I SERPL-MCNC: 0.02 NG/ML (ref 0–0.03)
UROBILINOGEN UR QL STRIP: ABNORMAL
VENTILATOR MODE: AC
VT ON VENT VENT: 550 ML
WBC NRBC COR # BLD: 11.35 10*3/MM3 (ref 4.8–10.8)
WBC UR QL AUTO: ABNORMAL /HPF
WHOLE BLOOD HOLD SPECIMEN: NORMAL

## 2019-03-10 PROCEDURE — 93306 TTE W/DOPPLER COMPLETE: CPT

## 2019-03-10 PROCEDURE — 86900 BLOOD TYPING SEROLOGIC ABO: CPT | Performed by: EMERGENCY MEDICINE

## 2019-03-10 PROCEDURE — 94799 UNLISTED PULMONARY SVC/PX: CPT

## 2019-03-10 PROCEDURE — 80053 COMPREHEN METABOLIC PANEL: CPT | Performed by: EMERGENCY MEDICINE

## 2019-03-10 PROCEDURE — 83605 ASSAY OF LACTIC ACID: CPT | Performed by: EMERGENCY MEDICINE

## 2019-03-10 PROCEDURE — 85025 COMPLETE CBC W/AUTO DIFF WBC: CPT | Performed by: EMERGENCY MEDICINE

## 2019-03-10 PROCEDURE — 93010 ELECTROCARDIOGRAM REPORT: CPT | Performed by: INTERNAL MEDICINE

## 2019-03-10 PROCEDURE — 25010000002 LORAZEPAM PER 2 MG: Performed by: INTERNAL MEDICINE

## 2019-03-10 PROCEDURE — 84100 ASSAY OF PHOSPHORUS: CPT | Performed by: EMERGENCY MEDICINE

## 2019-03-10 PROCEDURE — 25010000002 LEVOFLOXACIN PER 250 MG: Performed by: EMERGENCY MEDICINE

## 2019-03-10 PROCEDURE — 31500 INSERT EMERGENCY AIRWAY: CPT

## 2019-03-10 PROCEDURE — 63710000001 INSULIN REGULAR HUMAN PER 5 UNITS: Performed by: INTERNAL MEDICINE

## 2019-03-10 PROCEDURE — 82803 BLOOD GASES ANY COMBINATION: CPT

## 2019-03-10 PROCEDURE — 99223 1ST HOSP IP/OBS HIGH 75: CPT | Performed by: INTERNAL MEDICINE

## 2019-03-10 PROCEDURE — 93005 ELECTROCARDIOGRAM TRACING: CPT | Performed by: EMERGENCY MEDICINE

## 2019-03-10 PROCEDURE — 25010000002 VANCOMYCIN 10 G RECONSTITUTED SOLUTION: Performed by: INTERNAL MEDICINE

## 2019-03-10 PROCEDURE — 0BH17EZ INSERTION OF ENDOTRACHEAL AIRWAY INTO TRACHEA, VIA NATURAL OR ARTIFICIAL OPENING: ICD-10-PCS | Performed by: EMERGENCY MEDICINE

## 2019-03-10 PROCEDURE — 5A1955Z RESPIRATORY VENTILATION, GREATER THAN 96 CONSECUTIVE HOURS: ICD-10-PCS | Performed by: EMERGENCY MEDICINE

## 2019-03-10 PROCEDURE — 25010000002 PROPOFOL 1000 MG/ML EMULSION: Performed by: EMERGENCY MEDICINE

## 2019-03-10 PROCEDURE — 25010000002 CALCIUM GLUCONATE PER 10 ML: Performed by: INTERNAL MEDICINE

## 2019-03-10 PROCEDURE — 25010000002 MIDAZOLAM PER 1 MG: Performed by: INTERNAL MEDICINE

## 2019-03-10 PROCEDURE — 25010000002 FENTANYL CITRATE (PF) 100 MCG/2ML SOLUTION: Performed by: EMERGENCY MEDICINE

## 2019-03-10 PROCEDURE — 93306 TTE W/DOPPLER COMPLETE: CPT | Performed by: INTERNAL MEDICINE

## 2019-03-10 PROCEDURE — 87899 AGENT NOS ASSAY W/OPTIC: CPT | Performed by: INTERNAL MEDICINE

## 2019-03-10 PROCEDURE — 87040 BLOOD CULTURE FOR BACTERIA: CPT | Performed by: EMERGENCY MEDICINE

## 2019-03-10 PROCEDURE — 63710000001 INSULIN LISPRO (HUMAN) PER 5 UNITS: Performed by: INTERNAL MEDICINE

## 2019-03-10 PROCEDURE — 85730 THROMBOPLASTIN TIME PARTIAL: CPT | Performed by: EMERGENCY MEDICINE

## 2019-03-10 PROCEDURE — 94640 AIRWAY INHALATION TREATMENT: CPT

## 2019-03-10 PROCEDURE — 84484 ASSAY OF TROPONIN QUANT: CPT | Performed by: INTERNAL MEDICINE

## 2019-03-10 PROCEDURE — 86901 BLOOD TYPING SEROLOGIC RH(D): CPT | Performed by: EMERGENCY MEDICINE

## 2019-03-10 PROCEDURE — 81001 URINALYSIS AUTO W/SCOPE: CPT | Performed by: EMERGENCY MEDICINE

## 2019-03-10 PROCEDURE — 87205 SMEAR GRAM STAIN: CPT | Performed by: INTERNAL MEDICINE

## 2019-03-10 PROCEDURE — 71045 X-RAY EXAM CHEST 1 VIEW: CPT

## 2019-03-10 PROCEDURE — 82962 GLUCOSE BLOOD TEST: CPT

## 2019-03-10 PROCEDURE — 87070 CULTURE OTHR SPECIMN AEROBIC: CPT | Performed by: INTERNAL MEDICINE

## 2019-03-10 PROCEDURE — 86850 RBC ANTIBODY SCREEN: CPT | Performed by: EMERGENCY MEDICINE

## 2019-03-10 PROCEDURE — 25010000002 MORPHINE PER 10 MG: Performed by: INTERNAL MEDICINE

## 2019-03-10 PROCEDURE — 84145 PROCALCITONIN (PCT): CPT | Performed by: INTERNAL MEDICINE

## 2019-03-10 PROCEDURE — 85610 PROTHROMBIN TIME: CPT | Performed by: EMERGENCY MEDICINE

## 2019-03-10 PROCEDURE — 99291 CRITICAL CARE FIRST HOUR: CPT

## 2019-03-10 PROCEDURE — 36600 WITHDRAWAL OF ARTERIAL BLOOD: CPT

## 2019-03-10 PROCEDURE — 25010000002 MEROPENEM PER 100 MG: Performed by: INTERNAL MEDICINE

## 2019-03-10 PROCEDURE — 94002 VENT MGMT INPAT INIT DAY: CPT

## 2019-03-10 PROCEDURE — 83880 ASSAY OF NATRIURETIC PEPTIDE: CPT | Performed by: EMERGENCY MEDICINE

## 2019-03-10 PROCEDURE — 84484 ASSAY OF TROPONIN QUANT: CPT | Performed by: EMERGENCY MEDICINE

## 2019-03-10 PROCEDURE — 25010000002 ENOXAPARIN PER 10 MG: Performed by: INTERNAL MEDICINE

## 2019-03-10 PROCEDURE — 83735 ASSAY OF MAGNESIUM: CPT | Performed by: EMERGENCY MEDICINE

## 2019-03-10 PROCEDURE — 71250 CT THORAX DX C-: CPT

## 2019-03-10 PROCEDURE — 94770: CPT

## 2019-03-10 RX ORDER — METOPROLOL TARTRATE 50 MG/1
50 TABLET, FILM COATED ORAL 2 TIMES DAILY
COMMUNITY

## 2019-03-10 RX ORDER — SODIUM CHLORIDE 0.9 % (FLUSH) 0.9 %
10 SYRINGE (ML) INJECTION AS NEEDED
Status: DISCONTINUED | OUTPATIENT
Start: 2019-03-10 | End: 2019-03-19 | Stop reason: HOSPADM

## 2019-03-10 RX ORDER — DEXTROSE MONOHYDRATE 25 G/50ML
25 INJECTION, SOLUTION INTRAVENOUS
Status: DISCONTINUED | OUTPATIENT
Start: 2019-03-10 | End: 2019-03-19 | Stop reason: HOSPADM

## 2019-03-10 RX ORDER — SODIUM CHLORIDE, SODIUM LACTATE, POTASSIUM CHLORIDE, CALCIUM CHLORIDE 600; 310; 30; 20 MG/100ML; MG/100ML; MG/100ML; MG/100ML
50 INJECTION, SOLUTION INTRAVENOUS CONTINUOUS
Status: DISCONTINUED | OUTPATIENT
Start: 2019-03-10 | End: 2019-03-11

## 2019-03-10 RX ORDER — FAMOTIDINE 10 MG/ML
20 INJECTION, SOLUTION INTRAVENOUS DAILY
Status: DISCONTINUED | OUTPATIENT
Start: 2019-03-10 | End: 2019-03-17

## 2019-03-10 RX ORDER — LEVOFLOXACIN 5 MG/ML
750 INJECTION, SOLUTION INTRAVENOUS ONCE
Status: COMPLETED | OUTPATIENT
Start: 2019-03-10 | End: 2019-03-10

## 2019-03-10 RX ORDER — FENTANYL CITRATE 50 UG/ML
50 INJECTION, SOLUTION INTRAMUSCULAR; INTRAVENOUS ONCE
Status: COMPLETED | OUTPATIENT
Start: 2019-03-10 | End: 2019-03-10

## 2019-03-10 RX ORDER — ETOMIDATE 2 MG/ML
20 INJECTION INTRAVENOUS ONCE
Status: COMPLETED | OUTPATIENT
Start: 2019-03-10 | End: 2019-03-10

## 2019-03-10 RX ORDER — SODIUM CHLORIDE 0.9 % (FLUSH) 0.9 %
3 SYRINGE (ML) INJECTION EVERY 12 HOURS SCHEDULED
Status: DISCONTINUED | OUTPATIENT
Start: 2019-03-10 | End: 2019-03-19 | Stop reason: HOSPADM

## 2019-03-10 RX ORDER — MIDAZOLAM HYDROCHLORIDE 1 MG/ML
1 INJECTION INTRAMUSCULAR; INTRAVENOUS EVERY 4 HOURS PRN
Status: DISCONTINUED | OUTPATIENT
Start: 2019-03-10 | End: 2019-03-16

## 2019-03-10 RX ORDER — ROCURONIUM BROMIDE 10 MG/ML
25 INJECTION, SOLUTION INTRAVENOUS ONCE
Status: COMPLETED | OUTPATIENT
Start: 2019-03-10 | End: 2019-03-10

## 2019-03-10 RX ORDER — LORAZEPAM 2 MG/ML
1 INJECTION INTRAMUSCULAR EVERY 4 HOURS PRN
Status: DISCONTINUED | OUTPATIENT
Start: 2019-03-10 | End: 2019-03-16

## 2019-03-10 RX ORDER — ROCURONIUM BROMIDE 10 MG/ML
90 INJECTION, SOLUTION INTRAVENOUS ONCE
Status: COMPLETED | OUTPATIENT
Start: 2019-03-10 | End: 2019-03-10

## 2019-03-10 RX ORDER — HYDRALAZINE HYDROCHLORIDE 25 MG/1
25 TABLET, FILM COATED ORAL EVERY 6 HOURS PRN
COMMUNITY

## 2019-03-10 RX ORDER — LABETALOL HYDROCHLORIDE 5 MG/ML
10 INJECTION, SOLUTION INTRAVENOUS EVERY 4 HOURS PRN
Status: DISCONTINUED | OUTPATIENT
Start: 2019-03-10 | End: 2019-03-19 | Stop reason: HOSPADM

## 2019-03-10 RX ORDER — CLONIDINE HYDROCHLORIDE 0.1 MG/1
0.1 TABLET ORAL EVERY 8 HOURS
COMMUNITY
End: 2019-03-19 | Stop reason: HOSPADM

## 2019-03-10 RX ORDER — ONDANSETRON 2 MG/ML
4 INJECTION INTRAMUSCULAR; INTRAVENOUS EVERY 6 HOURS PRN
Status: DISCONTINUED | OUTPATIENT
Start: 2019-03-10 | End: 2019-03-19 | Stop reason: HOSPADM

## 2019-03-10 RX ORDER — SODIUM CHLORIDE 0.9 % (FLUSH) 0.9 %
3-10 SYRINGE (ML) INJECTION AS NEEDED
Status: DISCONTINUED | OUTPATIENT
Start: 2019-03-10 | End: 2019-03-19 | Stop reason: HOSPADM

## 2019-03-10 RX ORDER — NICOTINE POLACRILEX 4 MG
15 LOZENGE BUCCAL
Status: DISCONTINUED | OUTPATIENT
Start: 2019-03-10 | End: 2019-03-19 | Stop reason: HOSPADM

## 2019-03-10 RX ORDER — FAMOTIDINE 20 MG/1
20 TABLET, FILM COATED ORAL 2 TIMES DAILY
COMMUNITY

## 2019-03-10 RX ORDER — CHLORHEXIDINE GLUCONATE 0.12 MG/ML
15 RINSE ORAL EVERY 12 HOURS SCHEDULED
Status: DISCONTINUED | OUTPATIENT
Start: 2019-03-10 | End: 2019-03-17

## 2019-03-10 RX ORDER — CALCIUM GLUCONATE 94 MG/ML
1 INJECTION, SOLUTION INTRAVENOUS ONCE
Status: COMPLETED | OUTPATIENT
Start: 2019-03-10 | End: 2019-03-10

## 2019-03-10 RX ORDER — IPRATROPIUM BROMIDE AND ALBUTEROL SULFATE 2.5; .5 MG/3ML; MG/3ML
3 SOLUTION RESPIRATORY (INHALATION)
Status: DISCONTINUED | OUTPATIENT
Start: 2019-03-10 | End: 2019-03-15

## 2019-03-10 RX ADMIN — INSULIN LISPRO 4 UNITS: 100 INJECTION, SOLUTION INTRAVENOUS; SUBCUTANEOUS at 12:21

## 2019-03-10 RX ADMIN — IPRATROPIUM BROMIDE AND ALBUTEROL SULFATE 3 ML: 2.5; .5 SOLUTION RESPIRATORY (INHALATION) at 14:02

## 2019-03-10 RX ADMIN — MIDAZOLAM HYDROCHLORIDE 1 MG: 1 INJECTION, SOLUTION INTRAMUSCULAR; INTRAVENOUS at 22:57

## 2019-03-10 RX ADMIN — SODIUM CHLORIDE 2 G: 900 INJECTION INTRAVENOUS at 06:22

## 2019-03-10 RX ADMIN — MORPHINE SULFATE 4 MG: 4 INJECTION, SOLUTION INTRAMUSCULAR; INTRAVENOUS at 10:48

## 2019-03-10 RX ADMIN — MORPHINE SULFATE 4 MG: 4 INJECTION, SOLUTION INTRAMUSCULAR; INTRAVENOUS at 21:39

## 2019-03-10 RX ADMIN — DOXYCYCLINE 100 MG: 100 INJECTION, POWDER, LYOPHILIZED, FOR SOLUTION INTRAVENOUS at 11:39

## 2019-03-10 RX ADMIN — ETOMIDATE 20 MG: 2 INJECTION, SOLUTION INTRAVENOUS at 05:51

## 2019-03-10 RX ADMIN — VANCOMYCIN HYDROCHLORIDE 1250 MG: 10 INJECTION, POWDER, LYOPHILIZED, FOR SOLUTION INTRAVENOUS at 10:02

## 2019-03-10 RX ADMIN — Medication 50 MEQ: at 09:48

## 2019-03-10 RX ADMIN — INSULIN LISPRO 2 UNITS: 100 INJECTION, SOLUTION INTRAVENOUS; SUBCUTANEOUS at 21:09

## 2019-03-10 RX ADMIN — PROPOFOL 50 MCG/KG/MIN: 10 INJECTION, EMULSION INTRAVENOUS at 13:48

## 2019-03-10 RX ADMIN — SODIUM CHLORIDE, POTASSIUM CHLORIDE, SODIUM LACTATE AND CALCIUM CHLORIDE 50 ML/HR: 600; 310; 30; 20 INJECTION, SOLUTION INTRAVENOUS at 12:22

## 2019-03-10 RX ADMIN — MEROPENEM 1 G: 1 INJECTION, POWDER, FOR SOLUTION INTRAVENOUS at 09:49

## 2019-03-10 RX ADMIN — PROPOFOL 5 MCG/KG/MIN: 10 INJECTION, EMULSION INTRAVENOUS at 06:10

## 2019-03-10 RX ADMIN — INSULIN HUMAN 10 UNITS: 100 INJECTION, SOLUTION PARENTERAL at 09:48

## 2019-03-10 RX ADMIN — ROCURONIUM BROMIDE 25 MG: 10 INJECTION INTRAVENOUS at 07:33

## 2019-03-10 RX ADMIN — PROPOFOL 50 MCG/KG/MIN: 10 INJECTION, EMULSION INTRAVENOUS at 10:11

## 2019-03-10 RX ADMIN — CALCIUM GLUCONATE 1 G: 98 INJECTION, SOLUTION INTRAVENOUS at 09:48

## 2019-03-10 RX ADMIN — LORAZEPAM 1 MG: 2 INJECTION INTRAMUSCULAR; INTRAVENOUS at 21:35

## 2019-03-10 RX ADMIN — DOXYCYCLINE 100 MG: 100 INJECTION, POWDER, LYOPHILIZED, FOR SOLUTION INTRAVENOUS at 23:01

## 2019-03-10 RX ADMIN — IPRATROPIUM BROMIDE AND ALBUTEROL SULFATE 3 ML: 2.5; .5 SOLUTION RESPIRATORY (INHALATION) at 10:20

## 2019-03-10 RX ADMIN — FAMOTIDINE 20 MG: 10 INJECTION INTRAVENOUS at 09:48

## 2019-03-10 RX ADMIN — IPRATROPIUM BROMIDE AND ALBUTEROL SULFATE 3 ML: 2.5; .5 SOLUTION RESPIRATORY (INHALATION) at 20:16

## 2019-03-10 RX ADMIN — FENTANYL CITRATE 50 MCG: 50 INJECTION, SOLUTION INTRAMUSCULAR; INTRAVENOUS at 07:02

## 2019-03-10 RX ADMIN — MEROPENEM 1 G: 1 INJECTION, POWDER, FOR SOLUTION INTRAVENOUS at 21:09

## 2019-03-10 RX ADMIN — PROPOFOL 50 MCG/KG/MIN: 10 INJECTION, EMULSION INTRAVENOUS at 17:43

## 2019-03-10 RX ADMIN — ENOXAPARIN SODIUM 30 MG: 30 INJECTION SUBCUTANEOUS at 09:48

## 2019-03-10 RX ADMIN — LORAZEPAM 1 MG: 2 INJECTION INTRAMUSCULAR; INTRAVENOUS at 10:47

## 2019-03-10 RX ADMIN — LEVOFLOXACIN 750 MG: 750 INJECTION, SOLUTION INTRAVENOUS at 07:02

## 2019-03-10 RX ADMIN — PROPOFOL 50 MCG/KG/MIN: 10 INJECTION, EMULSION INTRAVENOUS at 21:46

## 2019-03-10 RX ADMIN — CHLORHEXIDINE GLUCONATE 15 ML: 1.2 RINSE ORAL at 21:09

## 2019-03-10 RX ADMIN — SODIUM CHLORIDE, PRESERVATIVE FREE 3 ML: 5 INJECTION INTRAVENOUS at 21:13

## 2019-03-10 RX ADMIN — ROCURONIUM BROMIDE 90 MG: 10 INJECTION INTRAVENOUS at 05:51

## 2019-03-10 NOTE — SIGNIFICANT NOTE
TRANSPORTED TO [facility] 4 ON TRANSPORT VENT ac 16 550 +7 100% BY RT AND RN. REPORT TO YASIR AQUINO RT @ BEDSIDE.  RSTALPRITI, RRT-NPS

## 2019-03-10 NOTE — PLAN OF CARE
Problem: Patient Care Overview  Goal: Plan of Care Review  Outcome: Ongoing (interventions implemented as appropriate)   03/10/19 5858   OTHER   Outcome Summary Received order from failed swallow screen, however pt is orally intubated. Will place on hold and follow up when pt extubated and appropriate for evaluation.

## 2019-03-10 NOTE — SIGNIFICANT NOTE
This note also relates to the following rows which could not be included:  Heart Rate - Cannot attach notes to unvalidated device data    Transport patient on transport ventilator AC 16 550 +8 100% for CT chest by RT and RN. VS monitored throughout procedure. RStallins, RRT-NPS

## 2019-03-10 NOTE — ED NOTES
RADIOLOGY AT BEDSIDE FOR PORTABLE CHEST XRAY FOR TUBE PLACEMENT.      Jessica Schneider, RN  03/10/19 0782

## 2019-03-10 NOTE — H&P
"    [facility] Medicine Services  HISTORY AND PHYSICAL    Date of Admission: 3/10/2019  Primary Care Physician: Dai Boston APRN    Subjective     Chief Complaint: Acute respiratory failure    History of Present Illness  Patient is a 33-year-old  male with past medical history significant for type 1 diabetes complicated by peripheral neuropathy, hypertension, and reported stage III chronic kidney disease that presented to our hospital today secondary to shortness of breath and acute respiratory failure.  Family at bedside reports the patient was just discharged from [facility] yesterday on March 9, 2019 with a diagnosis of pneumonia.  They report that he was initially evaluated in their emergency room, was admitted for approximately 3 days, was placed on antibiotic therapy and breathing treatments for pneumonia, and was given quite a bit of intravenous fluids.  He was subsequently discharged home yesterday with a prescription for duo nebs.  Patient had progressively worsening shortness of breath even after discharge home.  He was using one of his family members home oxygen, and even despite supplemental oxygen continued to have worsening shortness of breath.  Family at bedside even reports that he was complaining of chest pain and chest pressure.  The presented to our facility for further workup and management, he was found to be in respiratory distress, also with altered mental status, decision was made to go ahead and proceed with intubation and mechanical ventilation.  Patient is currently intubated and sedated and unable to provide additional history.  Family at bedside reports the patient was supposed to be getting a cardiac workup performed in the near future, in addition to a bone marrow biopsy given that \"his white cells were up but his blood cells were down\".  He has not had any new cough or congestion per their report.  No known fevers or " chills.  They report that he was recently diagnosed with stage III chronic kidney disease.  They state that he has been a type I diabetic since he was 13 years of age.  They report that he has no sensation in his feet due to neuropathy, does have a wound on his left foot which is being managed in wound care.  They report that he smokes about a pack of cigarettes per day.    Review of Systems     Otherwise complete ROS reviewed and negative except as mentioned in the HPI.    Past Medical History:   Past Medical History:   Diagnosis Date   • Bleeding from the nose    • Chronic kidney disease    • Diabetic polyneuropathy associated with type 1 diabetes mellitus (CMS/[facility]) 12/18/2017   • GERD (gastroesophageal reflux disease)    • Hypertension    • Tobacco abuse disorder 12/18/2017   • Type 1 diabetes mellitus with severe nonproliferative retinopathy of both eyes (CMS/[facility]) 12/18/2017     Past Surgical History:  Past Surgical History:   Procedure Laterality Date   • EYE SURGERY Right    • HERNIA REPAIR     • OTHER SURGICAL HISTORY      insulin pump insertion   • SKIN GRAFT       Social History:  reports that he has been smoking.  he has never used smokeless tobacco. Drug use questions deferred to the physician.    Family History: family history includes Hypertension in his mother.       Allergies:  Allergies   Allergen Reactions   • Penicillins Unknown (See Comments)     Has been allergic since a child     Medications:  Prior to Admission medications    Medication Sig Start Date End Date Taking? Authorizing Provider   Alcohol Swabs (ALCOHOL WIPES) 70 % pads Use 4 x daily 12/18/17   Carlton Lewis MD   amLODIPine (NORVASC) 5 MG tablet Take 5 mg by mouth Daily.    ProviderGanesh MD   atorvastatin (LIPITOR) 80 MG tablet Take 80 mg by mouth Daily.    ProviderGanesh MD   Blood Glucose Monitoring Suppl w/Device kit USE AS INDICATED, ANY MONITOR 12/18/17   Carlton Lewis MD   Glucose Blood  (BLOOD GLUCOSE TEST) strip Use 4 x daily, use any brand covered by insurance or same brand as before 12/18/17   Carlton Lewis MD   insulin aspart (NOVOLOG) 100 UNIT/ML injection USE AS DIRECTED UP  UNITS DAILY 1/25/19   Carlton Lewis MD   Lancet Devices (LANCING DEVICE) misc USE AS INDICATED TO CORRELATE WITH STRIPS AND METER 12/18/17   Carlton Lewis MD   Lancets 30G misc USE 4 X DAILY 12/18/17   Carlton Lewis MD   metoclopramide (REGLAN) 10 MG tablet Take 10 mg by mouth 3 (Three) Times a Day Before Meals.    ProviderGanesh MD   pantoprazole (PROTONIX) 40 MG EC tablet Take 40 mg by mouth 2 (Two) Times a Day.    Provider, MD Ganesh   Urine Glucose-Ketones Test strip 1 each by In Vitro route As Needed (elevated glucose). 12/18/17   Carlton Lewis MD     Objective     Vital Signs: /76   Pulse 85   Temp 97.3 °F (36.3 °C) (Rectal)   Resp 26   Wt 82.3 kg (181 lb 7 oz)   SpO2 94%   BMI 23.30 kg/m²   Physical Exam   Constitutional:   Intubated and sedated (on mechanical ventilation); ill appearing   HENT:   Head: Normocephalic.   Mouth/Throat: No oropharyngeal exudate (ET tube in place).   Eyes: Pupils are equal, round, and reactive to light. No scleral icterus.   Neck: No tracheal deviation present.   Cardiovascular: Normal rate and regular rhythm.   Pulmonary/Chest: Effort normal. He has wheezes. He has rales.   On mechanical ventilation currently at 100% Fi02 and 8 PEEP   Abdominal: Soft.   Bowel sounds present   Musculoskeletal: He exhibits edema (pitting edema to bilateral lower extremities).   Neurological:   Intubated and sedated on propofol   Skin:   Left foot wound (distal; dorsum of left foot extending over middle toes); mild erythema but not fluctuance or purulent discharge noted   Vitals reviewed.      Results Reviewed:  Lab Results (last 24 hours)     Procedure Component Value Units Date/Time    Blood Culture With EMERSON -  Blood, Arm, Left [[id number]] Collected:  03/10/19 0555    Specimen:  Blood from Arm, Left Updated:  03/10/19 0721    Blood Culture With EMERSON - Blood, Arm, Left [[id number]] Collected:  03/10/19 0550    Specimen:  Blood from Arm, Left Updated:  03/10/19 0720    BNP [[id number]]  (Abnormal) Collected:  03/10/19 0555    Specimen:  Blood Updated:  03/10/19 0709     proBNP 18,300.0 pg/mL     Troponin [[id number]]  (Normal) Collected:  03/10/19 0555    Specimen:  Blood Updated:  03/10/19 0709     Troponin I 0.016 ng/mL     Comprehensive Metabolic Panel [[id number]]  (Abnormal) Collected:  03/10/19 0555    Specimen:  Blood Updated:  03/10/19 0705     Glucose 182 mg/dL      BUN 66 mg/dL      Creatinine 4.19 mg/dL      Sodium 140 mmol/L      Potassium 5.8 mmol/L      Chloride 108 mmol/L      CO2 23.0 mmol/L      Calcium 8.9 mg/dL      Total Protein 6.5 g/dL      Albumin 3.40 g/dL      ALT (SGPT) 34 U/L      AST (SGOT) 49 U/L      Alkaline Phosphatase 94 U/L      Total Bilirubin 0.5 mg/dL      eGFR Non African Amer 16 mL/min/1.73      Globulin 3.1 gm/dL      A/G Ratio 1.1 g/dL      BUN/Creatinine Ratio 15.8     Anion Gap 9.0 mmol/L     Urinalysis With Culture If Indicated - Urine, Catheter [[id number]]  (Abnormal) Collected:  03/10/19 0650    Specimen:  Urine, Catheter Updated:  03/10/19 0703     Color, UA Yellow     Appearance, UA Clear     pH, UA 5.5     Specific Gravity, UA 1.019     Glucose,  mg/dL (2+)     Ketones, UA Negative     Bilirubin, UA Negative     Blood, UA Small (1+)     Protein, UA >=300 mg/dL (3+)     Leuk Esterase, UA Negative     Nitrite, UA Negative     Urobilinogen, UA 0.2 E.U./dL    Urinalysis, Microscopic Only - Urine, Catheter [[id number]]  (Abnormal) Collected:  03/10/19 0650    Specimen:  Urine, Catheter Updated:  03/10/19 0703     RBC, UA 13-20 /HPF      WBC, UA 3-5 /HPF      Bacteria, UA None Seen /HPF      Squamous Epithelial Cells, UA 0-2 /HPF      Hyaline Casts, UA 3-6 /LPF      Methodology  Automated Microscopy    [place] Draw [[id number]] Collected:  03/10/19 0555    Specimen:  Blood Updated:  03/10/19 0700    Narrative:       The following orders were created for panel order [place] Draw.  Procedure                               Abnormality         Status                     ---------                               -----------         ------                     Light Blue Top[[id number]]                                   Final result               Green Top (Gel)[[id number]]                                  Final result               Lavender Top[[id number]]                                     Final result               Red Top[[id number]]                                          Final result                 Please view results for these tests on the individual orders.    Green Top (Gel) [[id number]] Collected:  03/10/19 0555    Specimen:  Blood Updated:  03/10/19 0700     Extra Tube Hold for add-ons.     Comment: Auto resulted.       Light Blue Top [[id number]] Collected:  03/10/19 0555    Specimen:  Blood Updated:  03/10/19 0700     Extra Tube hold for add-on     Comment: Auto resulted       Lavender Top [[id number]] Collected:  03/10/19 0555    Specimen:  Blood Updated:  03/10/19 0700     Extra Tube hold for add-on     Comment: Auto resulted       Red Top [[id number]] Collected:  03/10/19 0555    Specimen:  Blood Updated:  03/10/19 0700     Extra Tube Hold for add-ons.     Comment: Auto resulted.       [place] Draw [[id number]] Collected:  03/10/19 0555    Specimen:  Blood Updated:  03/10/19 0700    Narrative:       The following orders were created for panel order [place] Draw.  Procedure                               Abnormality         Status                     ---------                               -----------         ------                     Light Blue Top[[id number]]                                   Final result               Green Top (Gel)[[id number]]                                  Final result                Lavender Top[[id number]]                                     Final result               Red Top[[id number]]                                                                       Please view results for these tests on the individual orders.    Light Blue Top [[id number]] Collected:  03/10/19 0555    Specimen:  Blood Updated:  03/10/19 0700     Extra Tube hold for add-on     Comment: Auto resulted       Green Top (Gel) [[id number]] Collected:  03/10/19 0555    Specimen:  Blood Updated:  03/10/19 0700     Extra Tube Hold for add-ons.     Comment: Auto resulted.       Lavender Top [[id number]] Collected:  03/10/19 0555    Specimen:  Blood Updated:  03/10/19 0700     Extra Tube hold for add-on     Comment: Auto resulted       Magnesium [[id number]]  (Normal) Collected:  03/10/19 0555    Specimen:  Blood Updated:  03/10/19 0658     Magnesium 2.2 mg/dL     Phosphorus [[id number]]  (Abnormal) Collected:  03/10/19 0555    Specimen:  Blood Updated:  03/10/19 0658     Phosphorus 6.4 mg/dL     Lactic Acid, Plasma [[id number]]  (Normal) Collected:  03/10/19 0555    Specimen:  Blood Updated:  03/10/19 0655     Lactate 0.9 mmol/L     Protime-INR [[id number]]  (Normal) Collected:  03/10/19 0555    Specimen:  Blood Updated:  03/10/19 0647     Protime 12.6 Seconds      INR 0.92    aPTT [[id number]]  (Normal) Collected:  03/10/19 0555    Specimen:  Blood Updated:  03/10/19 0647     PTT 30.7 seconds     CBC & Differential [[id number]] Collected:  03/10/19 0555    Specimen:  Blood Updated:  03/10/19 0639    Narrative:       The following orders were created for panel order CBC & Differential.  Procedure                               Abnormality         Status                     ---------                               -----------         ------                     CBC Auto Differential[[id number]]        Abnormal            Final result                 Please view results for these tests on the individual orders.    CBC Auto  Differential [[id number]]  (Abnormal) Collected:  03/10/19 0555    Specimen:  Blood Updated:  03/10/19 0639     WBC 11.35 10*3/mm3      RBC 3.79 10*6/mm3      Hemoglobin 11.4 g/dL      Hematocrit 35.3 %      MCV 93.1 fL      MCH 30.1 pg      MCHC 32.3 g/dL      RDW 17.2 %      RDW-SD 58.0 fl      MPV 10.4 fL      Platelets 234 10*3/mm3      Neutrophil % 60.0 %      Lymphocyte % 31.5 %      Monocyte % 4.7 %      Eosinophil % 2.5 %      Basophil % 1.0 %      Immature Grans % 0.3 %      Neutrophils, Absolute 6.82 10*3/mm3      Lymphocytes, Absolute 3.58 10*3/mm3      Monocytes, Absolute 0.53 10*3/mm3      Eosinophils, Absolute 0.28 10*3/mm3      Basophils, Absolute 0.11 10*3/mm3      Immature Grans, Absolute 0.03 10*3/mm3      nRBC 0.0 /100 WBC     Blood Gas, Arterial [[id number]]  (Abnormal) Collected:  03/10/19 0618    Specimen:  Arterial Blood Updated:  03/10/19 0621     Site Right Radial     Ji's Test Positive     pH, Arterial 7.226 pH units      Comment: 84 Value below reference range        pCO2, Arterial 53.0 mm Hg      Comment: 83 Value above reference range        pO2, Arterial 77.2 mm Hg      Comment: 84 Value below reference range        HCO3, Arterial 22.0 mmol/L      Base Excess, Arterial -5.7 mmol/L      Comment: 84 Value below reference range        O2 Saturation, Arterial 93.1 %      Comment: 84 Value below reference range        Temperature 37.0 C      Barometric Pressure for Blood Gas 751 mmHg      Modality Ventilator     FIO2 100 %      Ventilator Mode AC     Set Tidal Volume 550     Set [facility] Resp Rate 16.0     PEEP 8.0     Collected by [id number]     Comment: Meter: [id number]     :  [id number]       POC Glucose Once [[id number]]  (Abnormal) Collected:  03/10/19 0550    Specimen:  Blood Updated:  03/10/19 0602     Glucose 197 mg/dL      Comment: : [id number] Nancy JamiMeter ID: [id number]           Imaging Results (last 24 hours)     Procedure Component Value Units Date/Time    XR  Chest 1 View [[id number]] Updated:  03/10/19 0606        I have personally reviewed and interpreted the radiology studies and ECG obtained at time of admission.     Assessment / Plan     Assessment:   Active Hospital Problems    Diagnosis   • Acute respiratory failure with hypoxia (CMS/HCC)     And hypercapnia     • Acute congestive heart failure (CMS/HCC)   • Acute kidney injury (CMS/HCC)   • Hyperkalemia   • Anemia   • History of recent pneumonia   • Type 1 diabetes, uncontrolled, with gastroparesis (CMS/HCC)   • Diabetic polyneuropathy associated with type 1 diabetes mellitus (CMS/HCC)   • Tobacco abuse disorder     Plan:   1.  CT Chest (noncontrast) ordered in ED - pending  2.  ICU admission  3.  Would like to start IV diuretics however BUN 66 and Cr 4.19; obtain records from [facility] including lab studies to assess baseline renal function - patient discharged yesterday  4.  Nephrology consultation  5.  Pulmonary consultation  6.  STAT Echo  7.  Renal US  8.  Empiric IV Vanco and Merrem   9.  Blood and respiratory cultures  10.  Trend cardiac markers  11.  Calcium gluconate, insulin, sodium bicarbonate; repeat BMP this PM  12.  IV Pepcid/Lovenox PPx  13.  Check procalcitonin; urine Legionella and Strep pneumo Ag  14.  Scheduled Duonebs  15.  Patient will need close BS monitoring; type I DM  16.  Critically ill; workup ongoing    Cisco Castillo MD   03/10/19   7:33 AM

## 2019-03-10 NOTE — ED NOTES
PATIENT WITH HEALING WOUND NOTED TO TOP OF LEFT FOOT, PICTURES MADE.  FAMILY STATES PATIENT WITH WOUND OVER A MONTH.  PATIENT WITH SMALL HEALING WOUND NOTED TO RIGHT FIFTH TOE.      Jessica Schneider RN  03/10/19 0797

## 2019-03-10 NOTE — PROGRESS NOTES
"Pharmacy Dosing Service  Pharmacokinetics  Vancomycin Initial Evaluation    Assessment/Action/Plan:  Initiated Vancomycin 1250 mg IVPB every 24 hours. Vancomycin levels not ordered at this time. Pharmacy will monitor renal function and adjust dose accordingly.     Subjective:  Jose Shah is a 33 y.o. male with a Vancomycin \"Pharmacy to Dose\" consult for the treatment of pneumonia .    Objective:  Ht: 177.8 cm (70\"); Wt: 96.6 kg (212 lb 14.4 oz)  Estimated Creatinine Clearance: 29.2 mL/min (A) (by C-G formula based on SCr of 4.19 mg/dL (H)).   Lab Results   Component Value Date    CREATININE 4.19 (H) 03/10/2019    CREATININE 2.70 (H) 02/13/2019    CREATININE 1.08 03/30/2018      Lab Results   Component Value Date    WBC 11.35 (H) 03/10/2019    WBC 6.54 03/30/2018      Baseline culture results:  Microbiology Results (last 10 days)       ** No results found for the last 240 hours. **            Darius Palmer [facility]  03/10/19 9:14 AM   "

## 2019-03-10 NOTE — ED PROVIDER NOTES
Subjective   History of Present Illness    33-year-old male presenting respiratory distress.    Family notes that patient was recently discharged from outside hospital with a diagnosis of pneumonia.  Following discharge, patient gradually declined with worsening respiratory status.  Patient eventually became altered at which point EMS was called.  EMS found patient to be hypoxic, altered, in severe respiratory distress.  Patient did not tolerate CPAP and was placed on a nonrebreather facemask.    History severely limited due to acuity of condition.    Review of Systems   Unable to perform ROS: Intubated       Past Medical History:   Diagnosis Date   • Bleeding from the nose    • Chronic kidney disease    • Diabetic polyneuropathy associated with type 1 diabetes mellitus (CMS/[facility]) 12/18/2017   • GERD (gastroesophageal reflux disease)    • Hypertension    • Tobacco abuse disorder 12/18/2017   • Type 1 diabetes mellitus with severe nonproliferative retinopathy of both eyes (CMS/[facility]) 12/18/2017       Allergies   Allergen Reactions   • Penicillins Unknown (See Comments)     Has been allergic since a child       Past Surgical History:   Procedure Laterality Date   • EYE SURGERY Right    • HERNIA REPAIR     • OTHER SURGICAL HISTORY      insulin pump insertion   • SKIN GRAFT         Family History   Problem Relation Age of Onset   • Hypertension Mother        Social History     Socioeconomic History   • Marital status: Single     Spouse name: Not on file   • Number of children: Not on file   • Years of education: Not on file   • Highest education level: Not on file   Tobacco Use   • Smoking status: Current Every Day Smoker   • Smokeless tobacco: Never Used   Substance and Sexual Activity   • Drug use: Defer   • Sexual activity: Defer           Objective   Physical Exam   Constitutional:   Chronically ill-appearing   HENT:   Head: Normocephalic and atraumatic.   Eyes: Conjunctivae are normal.   Neck: Normal range of motion.    Cardiovascular:   Regular tachycardia   Pulmonary/Chest:   Severe respiratory distress, tachypnea, accessory muscle use  Diffuse crackles bilaterally   Abdominal: He exhibits no distension.   Musculoskeletal: He exhibits edema.   3+ pitting edema to knees bilaterally   Neurological: GCS eye subscore is 4. GCS verbal subscore is 2. GCS motor subscore is 5.   Skin: Skin is warm and dry.   Psychiatric: He has a normal mood and affect.   Nursing note and vitals reviewed.      Intubation  Date/Time: 3/10/2019 7:01 AM  Performed by: Ger Chino MD  Authorized by: Ger Chino MD     Consent:     Consent obtained:  Emergent situation    Alternatives discussed:  No treatment  Pre-procedure details:     Patient status:  Unresponsive    Pretreatment medications:  None    Paralytics:  Rocuronium  Procedure details:     Preoxygenation:  Nonrebreather mask    CPR in progress: no      Intubation method:  Oral    Oral intubation technique:  Video-assisted    Laryngoscope blade:  Mac 3    Tube size (mm):  7.5    Tube type:  Cuffed    Number of attempts:  1    Ventilation between attempts: no      Cricoid pressure: no      Tube visualized through cords: yes    Placement assessment:     ETT to lip:  23    Tube secured with:  ETT lara    Breath sounds:  Equal and absent over the epigastrium    Placement verification: chest rise, condensation, CXR verification, direct visualization, equal breath sounds and ETCO2 detector      CXR findings:  ETT in proper place  Post-procedure details:     Patient tolerance of procedure:  Tolerated well, no immediate complications  Critical Care  Performed by: Ger Chino MD  Authorized by: Benito Redd DO     Critical care provider statement:     Critical care time (minutes):  45    Critical care start time:  3/10/2019 5:40 AM    Critical care end time:  3/10/2019 6:25 AM    Critical care time was exclusive of:  Separately billable procedures and treating other  patients and teaching time    Critical care was necessary to treat or prevent imminent or life-threatening deterioration of the following conditions:  Respiratory failure    Critical care was time spent personally by me on the following activities:  Development of treatment plan with patient or surrogate, discussions with consultants, evaluation of patient's response to treatment, examination of patient, obtaining history from patient or surrogate, ordering and performing treatments and interventions, ordering and review of laboratory studies, pulse oximetry, re-evaluation of patient's condition, ordering and review of radiographic studies, review of old charts and ventilator management    I assumed direction of critical care for this patient from another provider in my specialty: no                 ED Course  ED Course as of Mar 10 0803   Sun Mar 10, 2019   0649 Patient is sepsis screen positive, antibiotics have been initiated, but patient's chest x-ray on presentation is more likely to represent volume overload and pulmonary edema.  For this reason the full sepsis bolus will not be given, patient may need immediate diuresis.  [NR]      ED Course User Index  [NR] Ger Chino MD                  MDM  Number of Diagnoses or Management Options  Diagnosis management comments: 33-year-old male with altered mental status and severe respiratory distress.  Differential includes pneumonia, pulmonary edema, pneumothorax.  Patient was intubated shortly after arrival for airway protection and hypoxia.  Patient will be treated for hospital-acquired pneumonia, unclear penicillin allergy.  Patient will need admission to the ICU for further evaluation and management.    At the time of signout labs and CT of the chest pending.       Amount and/or Complexity of Data Reviewed  Clinical lab tests: reviewed  Tests in the radiology section of CPT®: reviewed  Tests in the medicine section of CPT®: reviewed  Obtain history  from someone other than the patient: yes  Review and summarize past medical records: yes  Discuss the patient with other providers: yes    Critical Care  Total time providing critical care: 30-74 minutes    Patient Progress  Patient progress: stable      Patient turned over to me at shift change  Briefly this is a 33-year-old male with respiratory failure  History, physical exam, and chest x-ray concerning for volume overload as primary cause  Patient intubated for airway protection  Antibiotics initiated  Dr. Chino had been in contact with the hospitalist service about admission however at time of signout labs and CT chest are pending    Labs reviewed  Mild hyperkalemia  No significant changes on EKG  Hyperkalemia meds not indicated    CT chest showing concern for multifocal pneumonia and underlying edema    Stable VS  Appears comfortable on the ventilator  Dr. Castillo has already seen and admitted the patient  Stable for the ICU at this time        Final diagnoses:   Acute respiratory failure with hypoxia and hypercapnia (CMS/HCC)   Chronic kidney disease, unspecified CKD stage   Hyperkalemia   Hypervolemia, unspecified hypervolemia type   Anemia, unspecified type            Benito Redd, DO  03/10/19 0845

## 2019-03-10 NOTE — ED NOTES
PATIENTS INSULIN PUMP TURNED OFF, AND REMOVED, GIVEN TO FAMILY.  PER DR CARRION ORDERS.     Jessica Schneider, RN  03/10/19 0742

## 2019-03-10 NOTE — PLAN OF CARE
Problem: Patient Care Overview  Goal: Plan of Care Review  Outcome: Ongoing (interventions implemented as appropriate)   03/10/19 1701   OTHER   Outcome Summary bedside neuro pt was able to squeeze hands on command and wiggle toes, he noded appropriately and let me know he was not cold.   Plan of Care Review   Progress no change

## 2019-03-10 NOTE — CONSULTS
PULMONARY AND CRITICAL CARE CONSULT - [facility] Deven Shah   MR# [id number]  Acct# [id number]  3/10/2019   9:42 AM    Referring Provider: Cisco Castillo MD    Chief Complaint: Mechanically ventilated    HPI: We are consulted by Cisco Castillo MD to see this 33 y.o. male born on 1985.  Patient is admitted to the ICU from the emergency department today after being intubated.  He cannot provide any history right now and so the history is collected from my discussion with Dr. Csatillo and review of the record.  He does have a history of diabetes with complications.  Outside records are reviewed.  In summary these show that he has had cataract and uncontrolled type 1 diabetes mellitus with hyperglycemia.  He sees Dr. Carlton Oconnor for the above.  Hemoglobin A1c was 12.2 in March 2018.  He has had diabetic polyneuropathy with a diabetic foot wound, tobacco abuse disorder vitamin D deficiency and B12 deficiency as well.  He presented to the emergency department with shortness of breath.  He had been at [facility] yesterday and was treated with antibiotics apparently was in the hospital for 3 days and was given fluids and went home and unfortunately got worse so he came to this facility where he was intubated and admitted.    Past Medical History   has a past medical history of Bleeding from the nose, Chronic kidney disease, Diabetic polyneuropathy associated with type 1 diabetes mellitus (CMS/[facility]) (12/18/2017), GERD (gastroesophageal reflux disease), Hypertension, Tobacco abuse disorder (12/18/2017), and Type 1 diabetes mellitus with severe nonproliferative retinopathy of both eyes (CMS/[facility]) (12/18/2017).   has a past surgical history that includes Skin graft; Hernia repair; Eye surgery (Right); and Other surgical history.  Allergies   Allergen Reactions   • Penicillins Unknown (See Comments)     Has been allergic since a child      Medications    calcium gluconate 1 g Intravenous Once   chlorhexidine 15 mL Mouth/Throat Q12H   enoxaparin 30 mg Subcutaneous Q24H   famotidine 20 mg Intravenous Daily   insulin lispro 0-9 Units Subcutaneous Q4H   insulin regular 10 Units Intravenous Once   ipratropium-albuterol 3 mL Nebulization 4x Daily - RT   meropenem 1 g Intravenous Once   meropenem 1 g Intravenous Q12H   sodium bicarbonate 50 mEq Intravenous Once   sodium chloride 3 mL Intravenous Q12H   vancomycin 1,250 mg Intravenous Q24H       lactated ringers 50 mL/hr    Pharmacy to Dose meropenem (MERREM)     Pharmacy to dose vancomycin     propofol 5-50 mcg/kg/min Last Rate: 50 mcg/kg/min (03/10/19 0920)     Social History   reports that he has been smoking.  he has never used smokeless tobacco. Drug use questions deferred to the physician.  Family History  family history includes Hypertension in his mother.  Review of Systems:  Cannot obtain due to mechanical ventilation.  The patient notably is critically ill and connected to a ventilator.  As such patient cannot communicate and provide any history whatsoever, including any history of present illness or interval history since arrival or review of systems. The interested reviewer may note this fact, as an attempt has been made at collecting and documenting these portions of the patient history, but this information is unobtainable despite attempted review and therefore cannot be documented at this time.   Physical Exam:  Temp:  [97.3 °F (36.3 °C)-98.8 °F (37.1 °C)] 98.8 °F (37.1 °C)  Heart Rate:  [] 93  Resp:  [16-26] 19  BP: (113-176)/() 123/110  FiO2 (%):  [100 %] 100 %    Intake/Output Summary (Last 24 hours) at 3/10/2019 0942  Last data filed at 3/10/2019 0838  Gross per 24 hour   Intake 500 ml   Output --   Net 500 ml         03/10/19  0740 03/10/19  0840   Weight: 96.6 kg (212 lb 14.4 oz) 96.6 kg (212 lb 14.4 oz)     SpO2 Percentage    03/10/19 0840 03/10/19 0850 03/10/19 0905    SpO2: 95% 98% 98%     Physical Exam   Constitutional: He appears well-developed and well-nourished. He is sleeping.  Non-toxic appearance. He does not have a sickly appearance. He does not appear ill. No distress. He is sedated and intubated.   At the time of my visit he was still connected to the transport ventilator.   HENT:   Nose: Nose normal.   Eyes: No scleral icterus.   Neck: Neck supple. No JVD present.   Cardiovascular: Normal rate and regular rhythm.   No murmur heard.  Pulmonary/Chest: No accessory muscle usage. He is intubated. No respiratory distress. He has decreased breath sounds. He has no wheezes. He has no rhonchi.   Abdominal: Soft. He exhibits no distension. There is no tenderness. There is no guarding.   Musculoskeletal: He exhibits edema. He exhibits no deformity.   Lymphadenopathy:     He has no cervical adenopathy.   Neurological: He is unresponsive.   Skin: Skin is dry. No rash noted. He is not diaphoretic.   Psychiatric: His mood appears not anxious. He is not agitated.     Ventilator Settings:     Vt (Set, L): 0.55 L  Resp Rate (Set): 16     FiO2 (%): 100 %  PEEP/CPAP (cm H2O): 8 cm H20  Minute Ventilation (L/min) (Obs): 9.81 L/min  Resp Rate (Observed) Vent: 16     I:E Ratio (Obs): 1:2.8  PIP Observed (cm H2O): 34 cm H2O  Plateau Pressure (cm H2O): 32 cm H2O     Results from last 7 days   Lab Units 03/10/19  0555   WBC 10*3/mm3 11.35*   HEMOGLOBIN g/dL 11.4*   PLATELETS 10*3/mm3 234     Results from last 7 days   Lab Units 03/10/19  0555   SODIUM mmol/L 140   POTASSIUM mmol/L 5.8*   CO2 mmol/L 23.0*   BUN mg/dL 66*   CREATININE mg/dL 4.19*   MAGNESIUM mg/dL 2.2   PHOSPHORUS mg/dL 6.4*   GLUCOSE mg/dL 182*     Results from last 7 days   Lab Units 03/10/19  0930 03/10/19  0618   PH, ARTERIAL pH units 7.294* 7.226*   PCO2, ARTERIAL mm Hg 42.5 53.0*   PO2 ART mm Hg 90.2 77.2*   FIO2 % 100 100        Lab Results   Component Value Date    PROBNP 18,300.0 (H) 03/10/2019     Recent radiology:    Imaging Results (last 72 hours)     Procedure Component Value Units Date/Time    CT Chest Without Contrast [[id number]] Collected:  03/10/19 0758     Updated:  03/10/19 0805    Narrative:       CT CHEST WO CONTRAST- 3/10/2019 7:35 AM CDT  HISTORY: respiratory distress, eval for pna vs edema   COMPARISON: None  DOSE LENGTH PRODUCT: 477 mGy cm. Automated exposure control was also  utilized to decrease patient radiation dose.  TECHNIQUE: Axial images the chest are obtained without IV contrast  FINDINGS:  The endotracheal tube is appropriate in position with the tip  above the gabrielle. Reactive mediastinal lymph nodes measure up to 10 mm.  The thoracic aorta appears normal in caliber. The heart is borderline  prominent in size. There are small pericardial effusion is identified.  There is a small to moderate right and a small left pleural effusion.  Limited images the upper abdomen demonstrate no adrenal nodules.  There is smooth interlobular septal thickening within the aerated upper  lobes. There are scattered patchy groundglass opacities seen throughout  the lung parenchyma with dense consolidation of the lower lobes. No  discrete endobronchial lesions are identified. There is no pneumothorax.  No focal destructive bony lesions are visualized.    Impression:       1. Bilateral diffuse patchy opacities are suggestive for multifocal  pneumonia. There is also interlobular septal thickening suggesting  underlying edema. There is a small to moderate right and small left  pleural effusion with a very small pericardial effusion. Heart is  borderline enlarged. Thoracic aorta normal in caliber.  2. Appropriate positioning of the endotracheal tube.  This report was finalized on 03/10/2019 08:02 by Dr. Teri Schwartz MD.    XR Chest 1 View [[id number]] Updated:  03/10/19 0606        My radiograph interpretation/independent review of imaging: Bibasilar infiltrate/atelectasis, bilateral pleural effusions patchy areas of  infiltrate throughout the anterior portions of both lungs.  Other test results (not lab or imaging): No other tests available  Independent review of ekg: Sinus rhythm with left atrial abnormality, Q waves anteriorly.  QTC is 476 ms    Problem List as identified by Epic (may contain historical, inactive problems)  Patient Active Problem List   Diagnosis   • Type 1 diabetes, uncontrolled, with gastroparesis (CMS/HCC)   • Diabetic polyneuropathy associated with type 1 diabetes mellitus (CMS/HCC)   • Tobacco abuse disorder   • Vitamin D deficiency   • B12 deficiency   • Acute respiratory failure with hypoxia (CMS/HCC)   • Acute congestive heart failure (CMS/HCC)   • Acute kidney injury (CMS/HCC)   • Hyperkalemia   • Anemia   • History of recent pneumonia   • Acute respiratory failure (CMS/HCC)     Pulmonary Assessment:  SEVERE ACUTE RESPIRATORY FAILURE REQUIRING MECHANICAL VENTILATION  This is a threat to life or pulmonary function, high risk, due to multiple factors with possible pneumonia possible congestive heart failure or fluid overload related to acute on chronic renal failure in the setting of poorly controlled diabetes, type I with other complications    New problem (to me), with additional workup planned: Acute respiratory failure with abnormal chest CT  New problem (to me), no additional workup planned: Type 1 diabetes mellitus treated by endocrinology, poorly controlled and possibly poorly compliant.  Canceled office visits noted  Other problems either stable, failing to improve or worsenin. Suspected congestive heart failure or fluid overload related to acute on chronic renal failure, with elevated BNP  2. Bilateral pleural effusions likely related to #1  3. Possible incompletely controlled pneumonia or new pneumonia or recent pneumonia with resolution but persistent opacities which have not had time to clear  4. History of vitamin D deficiency  5. Hyperkalemia likely related to renal  failure  6. Metabolic acidosis    Recommend/plan:   Ventilator order set, including intravenous narcotics, benzodiazepines (that is controlled drugs) for sedation and ventilator tolerance  Chest X-ray in the morning tomorrow  Arterial blood gas analysis in the morning tomorrow  Additional plan:  · We will adjust the ventilator to better match his ventilatory needs.  Current respiratory rate is 19 but he has an inadequate respiratory response to acute metabolic acidosis.  This may in part be related to sedation but could be also related to respiratory muscle fatigue.  Doubt that he has underlying chronic pulmonary disease  · DVT prophylaxis  · Stress ulcer prophylaxis  · Discussed with Dr. Castillo  · Continue antibiotics IV, broad-spectrum  · Hold off on addition of quinolones or macrolides due to risk for long QT interval in a patient with undefined cardiac physiology at present, with therapy associated with potential arrhythmia.  We will add doxycycline for atypical coverage.  · Legionella urinary antigen  · Respiratory culture  · Defer diabetic management to medicine service    Thank you for this consult.  We will follow along.  Electronically signed by Sascha Chiang MD on 3/10/2019 at 9:42 AM

## 2019-03-11 ENCOUNTER — APPOINTMENT (OUTPATIENT)
Dept: GENERAL RADIOLOGY | Facility: HOSPITAL | Age: 34
End: 2019-03-11

## 2019-03-11 ENCOUNTER — APPOINTMENT (OUTPATIENT)
Dept: ULTRASOUND IMAGING | Facility: HOSPITAL | Age: 34
End: 2019-03-11

## 2019-03-11 PROBLEM — L97.521 DIABETIC ULCER OF TOE OF LEFT FOOT ASSOCIATED WITH TYPE 1 DIABETES MELLITUS, LIMITED TO BREAKDOWN OF SKIN (HCC): Chronic | Status: ACTIVE | Noted: 2019-03-11

## 2019-03-11 PROBLEM — J96.02 ACUTE RESPIRATORY FAILURE WITH HYPOXIA AND HYPERCAPNIA (HCC): Status: ACTIVE | Noted: 2019-03-10

## 2019-03-11 PROBLEM — E10.621 DIABETIC ULCER OF TOE OF LEFT FOOT ASSOCIATED WITH TYPE 1 DIABETES MELLITUS, LIMITED TO BREAKDOWN OF SKIN (HCC): Chronic | Status: ACTIVE | Noted: 2019-03-11

## 2019-03-11 LAB
ALBUMIN SERPL-MCNC: 2.2 G/DL (ref 3.5–5)
ALBUMIN/GLOB SERPL: 1 G/DL (ref 1.1–2.5)
ALP SERPL-CCNC: 69 U/L (ref 24–120)
ALT SERPL W P-5'-P-CCNC: 32 U/L (ref 0–54)
ANION GAP SERPL CALCULATED.3IONS-SCNC: 8 MMOL/L (ref 4–13)
ARTERIAL PATENCY WRIST A: POSITIVE
AST SERPL-CCNC: 20 U/L (ref 7–45)
ATMOSPHERIC PRESS: 758 MMHG
BASE EXCESS BLDA CALC-SCNC: -3.7 MMOL/L (ref 0–2)
BDY SITE: ABNORMAL
BILIRUB SERPL-MCNC: 0.3 MG/DL (ref 0.1–1)
BODY TEMPERATURE: 37 C
BUN BLD-MCNC: 67 MG/DL (ref 5–21)
BUN/CREAT SERPL: 15.2 (ref 7–25)
CALCIUM SPEC-SCNC: 8.5 MG/DL (ref 8.4–10.4)
CHLORIDE SERPL-SCNC: 109 MMOL/L (ref 98–110)
CO2 SERPL-SCNC: 21 MMOL/L (ref 24–31)
CREAT BLD-MCNC: 4.41 MG/DL (ref 0.5–1.4)
CREAT UR-MCNC: 50.1 MG/DL
DEPRECATED RDW RBC AUTO: 58 FL (ref 40–54)
ERYTHROCYTE [DISTWIDTH] IN BLOOD BY AUTOMATED COUNT: 16.6 % (ref 12–15)
GFR SERPL CREATININE-BSD FRML MDRD: 16 ML/MIN/1.73
GLOBULIN UR ELPH-MCNC: 2.1 GM/DL
GLUCOSE BLD-MCNC: 188 MG/DL (ref 70–100)
GLUCOSE BLDC GLUCOMTR-MCNC: 167 MG/DL (ref 70–130)
GLUCOSE BLDC GLUCOMTR-MCNC: 186 MG/DL (ref 70–130)
GLUCOSE BLDC GLUCOMTR-MCNC: 193 MG/DL (ref 70–130)
GLUCOSE BLDC GLUCOMTR-MCNC: 236 MG/DL (ref 70–130)
GLUCOSE BLDC GLUCOMTR-MCNC: 236 MG/DL (ref 70–130)
HBA1C MFR BLD: 5.3 %
HCO3 BLDA-SCNC: 21.9 MMOL/L (ref 20–26)
HCT VFR BLD AUTO: 25.1 % (ref 40–52)
HGB BLD-MCNC: 8.1 G/DL (ref 14–18)
HOROWITZ INDEX BLD+IHG-RTO: 50 %
IRON 24H UR-MRATE: 34 MCG/DL (ref 42–180)
IRON SATN MFR SERPL: 17 % (ref 20–45)
Lab: ABNORMAL
MCH RBC QN AUTO: 30.7 PG (ref 28–32)
MCHC RBC AUTO-ENTMCNC: 32.3 G/DL (ref 33–36)
MCV RBC AUTO: 95.1 FL (ref 82–95)
MODALITY: ABNORMAL
PCO2 BLDA: 41.1 MM HG (ref 35–45)
PEEP RESPIRATORY: 6 CM[H2O]
PH BLDA: 7.33 PH UNITS (ref 7.35–7.45)
PHOSPHATE SERPL-MCNC: 5.6 MG/DL (ref 2.5–4.5)
PLATELET # BLD AUTO: 161 10*3/MM3 (ref 130–400)
PMV BLD AUTO: 9.9 FL (ref 6–12)
PO2 BLDA: 80.9 MM HG (ref 83–108)
POTASSIUM BLD-SCNC: 5.8 MMOL/L (ref 3.5–5.3)
PROT SERPL-MCNC: 4.3 G/DL (ref 6.3–8.7)
PROT UR-MCNC: 128 MG/DL (ref 0–13.5)
RBC # BLD AUTO: 2.64 10*6/MM3 (ref 4.8–5.9)
SAO2 % BLDCOA: 96.5 % (ref 94–99)
SET MECH RESP RATE: 16
SODIUM BLD-SCNC: 138 MMOL/L (ref 135–145)
SODIUM UR-SCNC: 20 MMOL/L (ref 30–90)
TIBC SERPL-MCNC: 200 MCG/DL (ref 225–420)
VENTILATOR MODE: AC
VT ON VENT VENT: 550 ML
WBC NRBC COR # BLD: 6.14 10*3/MM3 (ref 4.8–10.8)

## 2019-03-11 PROCEDURE — 85027 COMPLETE CBC AUTOMATED: CPT | Performed by: INTERNAL MEDICINE

## 2019-03-11 PROCEDURE — 36600 WITHDRAWAL OF ARTERIAL BLOOD: CPT

## 2019-03-11 PROCEDURE — 25010000002 VANCOMYCIN PER 500 MG: Performed by: INTERNAL MEDICINE

## 2019-03-11 PROCEDURE — 71045 X-RAY EXAM CHEST 1 VIEW: CPT

## 2019-03-11 PROCEDURE — 82570 ASSAY OF URINE CREATININE: CPT | Performed by: NURSE PRACTITIONER

## 2019-03-11 PROCEDURE — 94799 UNLISTED PULMONARY SVC/PX: CPT

## 2019-03-11 PROCEDURE — 94770: CPT

## 2019-03-11 PROCEDURE — 63710000001 INSULIN LISPRO (HUMAN) PER 5 UNITS: Performed by: INTERNAL MEDICINE

## 2019-03-11 PROCEDURE — 84156 ASSAY OF PROTEIN URINE: CPT | Performed by: NURSE PRACTITIONER

## 2019-03-11 PROCEDURE — 74018 RADEX ABDOMEN 1 VIEW: CPT

## 2019-03-11 PROCEDURE — 25010000002 PROPOFOL 1000 MG/ML EMULSION: Performed by: EMERGENCY MEDICINE

## 2019-03-11 PROCEDURE — 82962 GLUCOSE BLOOD TEST: CPT

## 2019-03-11 PROCEDURE — 25010000002 FUROSEMIDE PER 20 MG: Performed by: NURSE PRACTITIONER

## 2019-03-11 PROCEDURE — 84300 ASSAY OF URINE SODIUM: CPT | Performed by: NURSE PRACTITIONER

## 2019-03-11 PROCEDURE — 25010000002 ENOXAPARIN PER 10 MG: Performed by: INTERNAL MEDICINE

## 2019-03-11 PROCEDURE — 82803 BLOOD GASES ANY COMBINATION: CPT

## 2019-03-11 PROCEDURE — 84100 ASSAY OF PHOSPHORUS: CPT | Performed by: INTERNAL MEDICINE

## 2019-03-11 PROCEDURE — 83550 IRON BINDING TEST: CPT | Performed by: INTERNAL MEDICINE

## 2019-03-11 PROCEDURE — 25010000002 MEROPENEM PER 100 MG: Performed by: INTERNAL MEDICINE

## 2019-03-11 PROCEDURE — 76775 US EXAM ABDO BACK WALL LIM: CPT

## 2019-03-11 PROCEDURE — 80053 COMPREHEN METABOLIC PANEL: CPT | Performed by: INTERNAL MEDICINE

## 2019-03-11 PROCEDURE — 83036 HEMOGLOBIN GLYCOSYLATED A1C: CPT | Performed by: INTERNAL MEDICINE

## 2019-03-11 PROCEDURE — 99233 SBSQ HOSP IP/OBS HIGH 50: CPT | Performed by: INTERNAL MEDICINE

## 2019-03-11 PROCEDURE — 94760 N-INVAS EAR/PLS OXIMETRY 1: CPT

## 2019-03-11 PROCEDURE — 83540 ASSAY OF IRON: CPT | Performed by: INTERNAL MEDICINE

## 2019-03-11 PROCEDURE — 25010000002 LORAZEPAM PER 2 MG: Performed by: INTERNAL MEDICINE

## 2019-03-11 RX ORDER — FUROSEMIDE 10 MG/ML
40 INJECTION INTRAMUSCULAR; INTRAVENOUS ONCE
Status: COMPLETED | OUTPATIENT
Start: 2019-03-11 | End: 2019-03-11

## 2019-03-11 RX ORDER — SODIUM POLYSTYRENE SULFONATE 4.1 MEQ/G
15 POWDER, FOR SUSPENSION ORAL; RECTAL ONCE
Status: COMPLETED | OUTPATIENT
Start: 2019-03-11 | End: 2019-03-11

## 2019-03-11 RX ORDER — SODIUM POLYSTYRENE SULFONATE 15 G/60ML
15 SUSPENSION ORAL; RECTAL ONCE
Status: DISCONTINUED | OUTPATIENT
Start: 2019-03-11 | End: 2019-03-11

## 2019-03-11 RX ORDER — VANCOMYCIN HYDROCHLORIDE 1 G/200ML
1000 INJECTION, SOLUTION INTRAVENOUS EVERY 24 HOURS
Status: DISCONTINUED | OUTPATIENT
Start: 2019-03-11 | End: 2019-03-12 | Stop reason: DRUGHIGH

## 2019-03-11 RX ORDER — BUMETANIDE 0.25 MG/ML
1 INJECTION INTRAMUSCULAR; INTRAVENOUS ONCE
Status: COMPLETED | OUTPATIENT
Start: 2019-03-11 | End: 2019-03-11

## 2019-03-11 RX ADMIN — FUROSEMIDE 40 MG: 10 INJECTION, SOLUTION INTRAMUSCULAR; INTRAVENOUS at 12:28

## 2019-03-11 RX ADMIN — VANCOMYCIN HYDROCHLORIDE 1000 MG: 1 INJECTION, SOLUTION INTRAVENOUS at 12:32

## 2019-03-11 RX ADMIN — PROPOFOL 50 MCG/KG/MIN: 10 INJECTION, EMULSION INTRAVENOUS at 00:53

## 2019-03-11 RX ADMIN — INSULIN LISPRO 4 UNITS: 100 INJECTION, SOLUTION INTRAVENOUS; SUBCUTANEOUS at 17:16

## 2019-03-11 RX ADMIN — SODIUM POLYSTYRENE SULFONATE 15 G: 1 POWDER ORAL; RECTAL at 12:28

## 2019-03-11 RX ADMIN — PROPOFOL 50 MCG/KG/MIN: 10 INJECTION, EMULSION INTRAVENOUS at 04:38

## 2019-03-11 RX ADMIN — INSULIN LISPRO 2 UNITS: 100 INJECTION, SOLUTION INTRAVENOUS; SUBCUTANEOUS at 04:56

## 2019-03-11 RX ADMIN — SODIUM CHLORIDE, POTASSIUM CHLORIDE, SODIUM LACTATE AND CALCIUM CHLORIDE 50 ML/HR: 600; 310; 30; 20 INJECTION, SOLUTION INTRAVENOUS at 06:35

## 2019-03-11 RX ADMIN — PROPOFOL 50 MCG/KG/MIN: 10 INJECTION, EMULSION INTRAVENOUS at 18:34

## 2019-03-11 RX ADMIN — CHLORHEXIDINE GLUCONATE 15 ML: 1.2 RINSE ORAL at 08:37

## 2019-03-11 RX ADMIN — LORAZEPAM 1 MG: 2 INJECTION INTRAMUSCULAR; INTRAVENOUS at 20:07

## 2019-03-11 RX ADMIN — PROPOFOL 50 MCG/KG/MIN: 10 INJECTION, EMULSION INTRAVENOUS at 14:59

## 2019-03-11 RX ADMIN — ENOXAPARIN SODIUM 30 MG: 30 INJECTION SUBCUTANEOUS at 08:37

## 2019-03-11 RX ADMIN — IPRATROPIUM BROMIDE AND ALBUTEROL SULFATE 3 ML: 2.5; .5 SOLUTION RESPIRATORY (INHALATION) at 20:31

## 2019-03-11 RX ADMIN — MEROPENEM 1 G: 1 INJECTION, POWDER, FOR SOLUTION INTRAVENOUS at 09:45

## 2019-03-11 RX ADMIN — SODIUM CHLORIDE, PRESERVATIVE FREE 3 ML: 5 INJECTION INTRAVENOUS at 20:06

## 2019-03-11 RX ADMIN — IPRATROPIUM BROMIDE AND ALBUTEROL SULFATE 3 ML: 2.5; .5 SOLUTION RESPIRATORY (INHALATION) at 14:08

## 2019-03-11 RX ADMIN — BUMETANIDE 1 MG: 0.25 INJECTION INTRAMUSCULAR; INTRAVENOUS at 14:42

## 2019-03-11 RX ADMIN — CHLORHEXIDINE GLUCONATE 15 ML: 1.2 RINSE ORAL at 20:06

## 2019-03-11 RX ADMIN — MEROPENEM 1 G: 1 INJECTION, POWDER, FOR SOLUTION INTRAVENOUS at 21:52

## 2019-03-11 RX ADMIN — LORAZEPAM 1 MG: 2 INJECTION INTRAMUSCULAR; INTRAVENOUS at 14:41

## 2019-03-11 RX ADMIN — PROPOFOL 49.82 MCG/KG/MIN: 10 INJECTION, EMULSION INTRAVENOUS at 08:37

## 2019-03-11 RX ADMIN — PROPOFOL 50 MCG/KG/MIN: 10 INJECTION, EMULSION INTRAVENOUS at 22:16

## 2019-03-11 RX ADMIN — INSULIN LISPRO 2 UNITS: 100 INJECTION, SOLUTION INTRAVENOUS; SUBCUTANEOUS at 01:04

## 2019-03-11 RX ADMIN — SODIUM BICARBONATE: 84 INJECTION, SOLUTION INTRAVENOUS at 14:42

## 2019-03-11 RX ADMIN — FAMOTIDINE 20 MG: 10 INJECTION INTRAVENOUS at 08:37

## 2019-03-11 RX ADMIN — SODIUM CHLORIDE, PRESERVATIVE FREE 3 ML: 5 INJECTION INTRAVENOUS at 08:38

## 2019-03-11 RX ADMIN — PROPOFOL 50 MCG/KG/MIN: 10 INJECTION, EMULSION INTRAVENOUS at 11:20

## 2019-03-11 RX ADMIN — IPRATROPIUM BROMIDE AND ALBUTEROL SULFATE 3 ML: 2.5; .5 SOLUTION RESPIRATORY (INHALATION) at 10:19

## 2019-03-11 RX ADMIN — IPRATROPIUM BROMIDE AND ALBUTEROL SULFATE 3 ML: 2.5; .5 SOLUTION RESPIRATORY (INHALATION) at 06:24

## 2019-03-11 RX ADMIN — INSULIN LISPRO 4 UNITS: 100 INJECTION, SOLUTION INTRAVENOUS; SUBCUTANEOUS at 14:59

## 2019-03-11 NOTE — PROGRESS NOTES
PULMONARY AND CRITICAL CARE PROGRESS NOTE - [facility]    Patient: Jose Shah    1985    MR# [id number]    Acct# [id number]  03/11/19   8:44 AM  Referring Provider: Darius Sharma DO    Chief Complaint: Mechanically ventilated    Interval history: Patient remains intubated and sedated.  Issues overnight with agitation requiring additional sedatives to prevent self extubation.  Chest x-ray and ABG remained stable.  No family at the bedside.  Renal failure and hyperkalemia continue to be an issue.    Meds:    chlorhexidine 15 mL Mouth/Throat Q12H   enoxaparin 30 mg Subcutaneous Q24H   famotidine 20 mg Intravenous Daily   insulin lispro 0-9 Units Subcutaneous Q6H   ipratropium-albuterol 3 mL Nebulization 4x Daily - RT   meropenem 1 g Intravenous Q12H   sodium chloride 3 mL Intravenous Q12H   vancomycin 1,000 mg Intravenous Q24H       lactated ringers 50 mL/hr Last Rate: 50 mL/hr (03/11/19 0635)   Pharmacy to Dose meropenem (MERREM)     propofol 5-50 mcg/kg/min Last Rate: 49.818 mcg/kg/min (03/11/19 0837)     Review of Systems:     Cannot obtain due to mechanical ventilation.  The patient notably is critically ill and connected to a ventilator.  As such patient cannot communicate and provide any history whatsoever, including any history of present illness or interval history since arrival or review of systems. The interested reviewer may note this fact, as an attempt has been made at collecting and documenting these portions of the patient history, but this information is unobtainable despite attempted review and therefore cannot be documented at this time.     Ventilator Settings:     Vt (Set, L): 0.55 L  Resp Rate (Set): 16  Pressure Support (cm H2O): 0 cm H20  FiO2 (%): 50 %  PEEP/CPAP (cm H2O): 6 cm H20  Minute Ventilation (L/min) (Obs): 18.7 L/min  Resp Rate (Observed) Vent: 16  I:E Ratio (Set): 1:2.80  I:E Ratio (Obs): 1:2.8  PIP Observed (cm H2O): 31 cm H2O     Physical Exam:  Temp:   [97.3 °F (36.3 °C)-97.9 °F (36.6 °C)] 97.7 °F (36.5 °C)  Heart Rate:  [] 77  Resp:  [16-22] 16  BP: ()/() 108/73  FiO2 (%):  [50 %-100 %] 50 %    Intake/Output Summary (Last 24 hours) at 3/11/2019 0844  Last data filed at 3/11/2019 0400  Gross per 24 hour   Intake 1937 ml   Output 1175 ml   Net 762 ml     SpO2 Percentage    03/11/19 0631 03/11/19 0700 03/11/19 0800   SpO2: 100% 99% 94%      Physical Exam:    Constitutional: He appears well-developed and well-nourished. He is sleeping.  Non-toxic appearance. He does not have a sickly appearance. He does not appear ill. No distress. He is sedated and intubated.   HENT:   Nose: Nose normal.   Eyes: No scleral icterus.   Neck: Neck supple. No JVD present.   Cardiovascular: Normal rate and regular rhythm.   No murmur heard.  Pulmonary/Chest: No accessory muscle usage. He is intubated. No respiratory distress. He has decreased breath sounds. He has no wheezes. He has no rhonchi.   Abdominal: Soft. He exhibits no distension. There is no tenderness. There is no guarding.   Musculoskeletal: He exhibits edema. He exhibits no deformity.   Lymphadenopathy:     He has no cervical adenopathy.   Neurological: He is unresponsive.   Skin: Skin is dry. No rash noted. He is not diaphoretic.   Psychiatric: His mood appears not anxious. He is not agitated        Results from last 7 days   Lab Units 03/11/19  0603 03/10/19  0555   WBC 10*3/mm3 6.14 11.35*   HEMOGLOBIN g/dL 8.1* 11.4*   PLATELETS 10*3/mm3 161 234     Results from last 7 days   Lab Units 03/11/19  0340 03/10/19  1505 03/10/19  0555   SODIUM mmol/L 138 139 140   POTASSIUM mmol/L 5.8* 5.1 5.8*   BUN mg/dL 67* 66* 66*   CREATININE mg/dL 4.41* 4.37* 4.19*     Results from last 7 days   Lab Units 03/11/19  0505 03/10/19  1510 03/10/19  0930   PH, ARTERIAL pH units 7.335* 7.355 7.294*   PCO2, ARTERIAL mm Hg 41.1 40.4 42.5   PO2 ART mm Hg 80.9* 231.0* 90.2   FIO2 % 50 80 100     Blood Culture   Date Value Ref  Range Status   03/10/2019 No growth at 24 hours  Preliminary   03/10/2019 No growth at 24 hours  Preliminary     Recent films:  Ct Chest Without Contrast    Result Date: 3/10/2019  1. Bilateral diffuse patchy opacities are suggestive for multifocal pneumonia. There is also interlobular septal thickening suggesting underlying edema. There is a small to moderate right and small left pleural effusion with a very small pericardial effusion. Heart is borderline enlarged. Thoracic aorta normal in caliber. 2. Appropriate positioning of the endotracheal tube. This report was finalized on 03/10/2019 08:02 by Dr. Teri Schwartz MD.    Xr Chest 1 View    Result Date: 3/11/2019  1. Persistent bilateral interstitial air space filling infiltrates. Says appearance pulmonary edema. Bilateral pleural effusions are noted. There is no significant improvement from March 10.   This report was finalized on 03/11/2019 06:58 by Dr. Natalio Uriostegui MD.    Xr Chest 1 View    Result Date: 3/10/2019  1. Diffuse mixed interstitial alveolar opacities with small pleural effusions. Findings may be due to edema and/or pneumonia. Appropriate positioning of the endotracheal tube. This report was finalized on 03/10/2019 09:53 by Dr. Teri Schwartz MD.    Films reviewed personally by me.  My interpretation: Endotracheal tube above the clavicles but below the level of the vocal cords, bilateral pleural effusions persist with bilateral interstitial infiltrates    Pulmonary Assessment:    1. Acute respiratory failure requiring mechanical ventilation secondary to bilateral lung infiltrates  2. Poorly controlled diabetes  3. Acute on chronic renal failure  4. Suspected congestive heart failure or fluid overload related to acute on chronic renal failure, with elevated BNP  5. Bilateral pleural effusions likely related to #1  6. Possible incompletely controlled pneumonia or new pneumonia or recent pneumonia with resolution but persistent opacities which have  not had time to clear  7. History of vitamin D deficiency  8. Hyperkalemia likely related to renal failure  9. Metabolic acidosis    Recommend:     · No adjustments made to the ventilator.  Not ready for weaning trials  · Adequate sedation to prevent self extubation  · Stress ulcer and DVT prophylaxis  · Continue broad-spectrum antibiotic coverage  · We will hold off on additional quinolone or macrolides given his prolonged QT interval  · Respiratory culture pending  · Diabetic management per attending  · Prognosis guarded    Electronically signed by MASOUD Stoner on 3/11/2019 at 8:44 AM    Physician substantive portion:  Patient is sedated on the ventilator.  Chest has a few scattered crackles.  He has good ventilator synchrony.  Wound is present on toes of the left foot.  Skin is otherwise warm and dry.  He is still significantly acidotic with metabolic acidosis.  Still in renal failure.  Not ready to extubate.  Plans as noted above.    I have seen and examined patient personally, performing a face-to-face diagnostic evaluation with plan of care reviewed and developed with APRN and nursing staff. I have addended and/or modified the above history of present illness, physical examination, and assessment and plan to reflect my findings and impressions. Essential elements of the care plan were discussed with APRN above.  Agree with findings and assessment/plan as documented above.    Electronically signed by Sascha Chiang MD, on 3/11/2019, 3:29 PM

## 2019-03-11 NOTE — PROGRESS NOTES
[facility] Medicine Services  INPATIENT PROGRESS NOTE    Patient Name: Jose Shah  Date of Admission: 3/10/2019  Today's Date: 03/11/19  Length of Stay: 1  Primary Care Physician: Dai Boston APRN    Subjective   Chief Complaint: respiratory failure  HPI   He continues to have poor renal function, but he remains nonoliguric.  His potassium remains elevated.    No acute events overnight per nursing.    Currently sedate on propofol with no other drips necessary.    Review of Systems   Unable to assess secondary to the patient's intubated and sedated state.     Objective    Temp:  [97.3 °F (36.3 °C)-98.8 °F (37.1 °C)] 97.7 °F (36.5 °C)  Heart Rate:  [] 77  Resp:  [16-22] 16  BP: ()/() 108/73  FiO2 (%):  [50 %-100 %] 50 %  Physical Exam   Constitutional: He appears well-developed and well-nourished.   No distress on ventilatory support.  No family present.  Seen and discussed with his nurse, Sinai.   HENT:   Head: Normocephalic and atraumatic.   Eyes: Conjunctivae are normal. Pupils are equal, round, and reactive to light.   Neck: Neck supple. No JVD present.   Cardiovascular: Normal rate, regular rhythm, normal heart sounds and intact distal pulses. Exam reveals no gallop and no friction rub.   No murmur heard.  Pulmonary/Chest: He has rales.   Ventilated, diminished at the bases with rales.   Abdominal: Soft. Bowel sounds are normal. He exhibits no distension. There is no tenderness. There is no rebound and no guarding.   Musculoskeletal: Normal range of motion. He exhibits edema. He exhibits no tenderness or deformity.   Neurological: He displays normal reflexes. He exhibits normal muscle tone.   Sedate with propofol.  Withdraws to painful stimuli equally in all extremities.   Skin: Skin is warm and dry. No rash noted.   He has an excoriated ulcer on the left foot on the dorsum involving the fourth toe predominantly.  This does not appear to be infected.        Intake/Output Summary (Last 24 hours) at 3/11/2019 0833  Last data filed at 3/11/2019 0400  Gross per 24 hour   Intake 2087 ml   Output 1175 ml   Net 912 ml     Results Review:  I have reviewed the labs, radiology results, and diagnostic studies.    Laboratory Data:   Results from last 7 days   Lab Units 03/11/19  0603 03/10/19  0555   WBC 10*3/mm3 6.14 11.35*   HEMOGLOBIN g/dL 8.1* 11.4*   HEMATOCRIT % 25.1* 35.3*   PLATELETS 10*3/mm3 161 234     Results from last 7 days   Lab Units 03/11/19  0340 03/10/19  1505 03/10/19  0555   SODIUM mmol/L 138 139 140   POTASSIUM mmol/L 5.8* 5.1 5.8*   CHLORIDE mmol/L 109 109 108   CO2 mmol/L 21.0* 23.0* 23.0*   BUN mg/dL 67* 66* 66*   CREATININE mg/dL 4.41* 4.37* 4.19*   CALCIUM mg/dL 8.5 8.4 8.9   BILIRUBIN mg/dL 0.3  --  0.5   ALK PHOS U/L 69  --  94   ALT (SGPT) U/L 32  --  34   AST (SGOT) U/L 20  --  49*   GLUCOSE mg/dL 188* 151* 182*     Culture Data:   Blood Culture   Date Value Ref Range Status   03/10/2019 No growth at 24 hours  Preliminary   03/10/2019 No growth at 24 hours  Preliminary     Radiology Data:   Imaging Results (last 24 hours)     Procedure Component Value Units Date/Time    XR Chest 1 View [[id number]] Collected:  03/11/19 0657     Updated:  03/11/19 0701    Narrative:       EXAMINATION:   XR CHEST 1 VW-  3/11/2019 6:57 AM CDT     HISTORY: Patient intubated     Frontal upright radiograph of the chest 3/11/2019 3:19 AM CDT     COMPARISON: March 10, 2019.     FINDINGS:   Again bilateral interstitial air space filling infiltrates are present.  Says appearance of pulmonary edema. This is not significantly changed  compared to prior study March 10. Increased density in the right lung  base is noted in the left lung base. This may be posterior pleural  effusions. The cardiac silhouette is normal. Infiltrate tube is  unchanged in position..      The osseous structures and surrounding soft tissues demonstrate no acute  abnormality.       Impression:       1.  Persistent bilateral interstitial air space filling infiltrates. Says  appearance pulmonary edema. Bilateral pleural effusions are noted. There  is no significant improvement from March 10.        This report was finalized on 03/11/2019 06:58 by Dr. Natalio Uriostegui MD.    XR Chest 1 View [[id number]] Collected:  03/10/19 0952     Updated:  03/10/19 0956    Narrative:       HISTORY: Dyspnea     CXR: A frontal view the chest is obtained.     COMPARISON: 12/2/2012     FINDINGS: Endotracheal tube is appropriately positioned with the tip at  the T4 level. There are diffuse bilateral pulmonary opacities. Mixed  interstitial alveolar. There are small pleural effusions. Cardiac  silhouette is obscured by the pulmonary opacities. There is no  pneumothorax. There is no acute bony pathology.       Impression:       1. Diffuse mixed interstitial alveolar opacities with small pleural  effusions. Findings may be due to edema and/or pneumonia. Appropriate  positioning of the endotracheal tube.  This report was finalized on 03/10/2019 09:53 by Dr. Teri Schwartz MD.        I have reviewed the patient's current medications.     Assessment/Plan     Active Hospital Problems    Diagnosis   • **Acute respiratory failure with hypoxia and hypercapnia (CMS/HCC)   • History of recent pneumonia   • Acute congestive heart failure (CMS/HCC)   • Diabetic ulcer of toe of left foot associated with type 1 diabetes mellitus, limited to breakdown of skin (CMS/HCC)   • Acute kidney injury (CMS/HCC)   • Hyperkalemia   • Anemia   • Type 1 diabetes, uncontrolled, with gastroparesis (CMS/HCC)   • Diabetic polyneuropathy associated with type 1 diabetes mellitus (CMS/HCC)   • Tobacco abuse disorder     Plan:  He was originally admitted on 3/10 by Dr. Castillo.  He presented with acute hypoxic and hypercarbic respiratory failure.  History was given that he had recently been discharged from [facility] in [place], [place]. He was treated  there for pneumonia per report.  He required intubation.  He remains mechanically ventilated.    Pulmonary has been consulted to assist with his mechanical ventilation.  Continue inhaled bronchodilators.  He is currently being covered broadly with vancomycin, doxycycline, and meropenem.  I would stop doxycycline for now.  He received 1 dose of levofloxacin on 3/10.  Urinary antigens are negative and cultures show no growth thus far.    Nephrology has been consulted.  He was found to be in acute renal failure with hyperkalemia.  These conditions are still present and have worsened today.  He may ultimately need hemodialysis    Monitor hemoglobin.  No signs of bleeding.  Anemia substrates that were checked in February 2019 were normal.    He is a long-standing type I diabetic.  His current hemoglobin A1c is 5.3.  Continue Accu-Cheks and sliding scale insulin for now.    Lovenox for DVT prophylaxis.  Pepcid for peptic ulcer prophylaxis while on ventilatory support.    Darius Sharma DO   03/11/19   8:33 AM

## 2019-03-11 NOTE — SIGNIFICANT NOTE
Pt became very agitated and restless around 2100 when I went into pts room to perform oral care. When performing oral care pt started to tug on the restraints and was shaking his head back and forth rapidly. Pt then began to sit up in bed while breathing 40 times per minute, eyes wide open and he tried mouthing words. Morphine 4mg and ativan 1mg were administered but did not seem to calm the pt. Pt continued to sit up in bed and tug on restraints very hard. Pt tried getting up out of bed with so much force that he almost extubated himself in the process. Dr. Paulson was paged at 2130 and 2141, at 2147 she called and gave an order to give versed 1mg. Versed was administered. Pt currently is less agitated and not trying to sit up in bed, but does continue to move feet and move head from side to side. Propofol continues to be at max at 50.

## 2019-03-11 NOTE — PLAN OF CARE
Problem: Patient Care Overview  Goal: Plan of Care Review  Outcome: Ongoing (interventions implemented as appropriate)   03/10/19 1932   OTHER   Outcome Summary When pt arrived to the unit around 0840 this am he was not squeezing my hands on commands, he had spontaneous movements of extremities, at times he looked like he was reaching for his ET tube. His BP was in the 160's systolic, he was restless. He would open his eyes spontaneously but would not follow commands. A couple hours later with diprivan going at 50mck/kg/min he was trying to sit up and shaking his head no, and looked like he was uncomfortable. I did an assessment at this time he was able to nod that he was cold, and he could squeeze hands on command and he could wiggle his toes on command. I called Dr. Castillo about the pt lifting his head and being uncomfortable. He ordered morphine and ativan, I gave both to the pt and he tolerated well, the rest of the shift his heart rate was normal, and BP was in the 120's systolic, A little after 1700 I did another neuro assessment and he could follow commands and nod that he was not cold he had several warm blankets on. Just completed bedside report with Fransisca IZAGUIRRE we did neuro assessment together and pt was calmer and he followed all commands as he did earlier. I asked if he was cold he shook his head no, and I asked if he was hurting anywhere and he shook his head no. He is still in upper restraints, he has urine output and he had an echo done today. His mom and sister came to visit and are accepting this course of treatment. We did get records from [facility] and they should be scanned into the chart.   Plan of Care Review   Progress improving

## 2019-03-11 NOTE — CONSULTS
Nephrology ([facility]) Consult Note      Patient:  Jose Shah  YOB: 1985  Date of Service: 3/11/2019  MRN: [id number]   Acct: [id number]   Primary Care Physician: Dai Boston APRN  Advance Directive:   Code Status and Medical Interventions:   Ordered at: 03/10/19 0743     Code Status:    CPR     Medical Interventions (Level of Support Prior to Arrest):    Full     Admit Date: 3/10/2019       Hospital Day: 1  Referring Provider: No ref. provider found      Patient personally seen and examined.  Complete chart including Consults, Notes, Operative Reports, Labs, Cardiology, and Radiology studies reviewed as able.        Subjective:  Jose Shah is a 33 y.o. male  whom we were consulted for acute kidney injury and hyperkalemia.  Baseline chronic kidney disease stage 4; follows with Dr Yan in [place], [place].  History of type I diabetes with noncompliance in the past, Lopez's esophagus, hypertension, COPD, tobacco abuse.  Recent piror admission at [facility] in [place] for pneumonia. Discharged from their facility on 3/09; and presented to [facility] emergency department on 3/10.  Presented with increased dyspnea, altered mental status. Required intubation and mechanical ventilation shortly after arrival. Currently is intubated and sedated, urine output nonoliguric.  Has been moderately hyperkalemic.      Allergies:  Penicillins    Home Meds:  Medications Prior to Admission   Medication Sig Dispense Refill Last Dose   • CloNIDine (CATAPRES) 0.1 MG tablet Take 0.1 mg by mouth Every 8 (Eight) Hours.      • famotidine (PEPCID) 20 MG tablet Take 20 mg by mouth 2 (Two) Times a Day.      • hydrALAZINE (APRESOLINE) 25 MG tablet Take 25 mg by mouth Every 6 (Six) Hours As Needed.      • metoprolol tartrate (LOPRESSOR) 50 MG tablet Take 50 mg by mouth 2 (Two) Times a Day.      • Alcohol Swabs (ALCOHOL WIPES) 70 % pads Use 4 x daily 120 each 11 Taking   •  amLODIPine (NORVASC) 5 MG tablet Take 5 mg by mouth Daily.   2/28/2019 at Unknown time   • atorvastatin (LIPITOR) 80 MG tablet Take 80 mg by mouth Daily.   2/28/2019 at Unknown time   • Blood Glucose Monitoring Suppl w/Device kit USE AS INDICATED, ANY MONITOR 1 each 1 Taking   • Glucose Blood (BLOOD GLUCOSE TEST) strip Use 4 x daily, use any brand covered by insurance or same brand as before 120 each 11 Taking   • insulin aspart (NOVOLOG) 100 UNIT/ML injection USE AS DIRECTED UP  UNITS DAILY 5 each 5 3/1/2019 at Unknown time   • Lancet Devices (LANCING DEVICE) misc USE AS INDICATED TO CORRELATE WITH STRIPS AND METER 1 each 1 Taking   • Lancets 30G misc USE 4 X DAILY 120 each 11 Taking   • metoclopramide (REGLAN) 10 MG tablet Take 10 mg by mouth 3 (Three) Times a Day Before Meals.   2/28/2019 at Unknown time   • pantoprazole (PROTONIX) 40 MG EC tablet Take 40 mg by mouth 2 (Two) Times a Day.   2/28/2019 at Unknown time   • Urine Glucose-Ketones Test strip 1 each by In Vitro route As Needed (elevated glucose). 100 each 11 Taking       Medicines:  Current Facility-Administered Medications   Medication Dose Route Frequency Provider Last Rate Last Dose   • chlorhexidine (PERIDEX) 0.12 % solution 15 mL  15 mL Mouth/Throat Q12H Cisco Castillo MD   15 mL at 03/11/19 0837   • dextrose (D50W) 25 g/ 50mL Intravenous Solution 25 g  25 g Intravenous Q15 Min PRN Cisco Castillo MD       • dextrose (GLUTOSE) oral gel 15 g  15 g Oral Q15 Min PRN Cisco Castillo MD       • enoxaparin (LOVENOX) syringe 30 mg  30 mg Subcutaneous Q24H Cisco Castillo MD   30 mg at 03/11/19 0837   • famotidine (PEPCID) injection 20 mg  20 mg Intravenous Daily Cisco Castillo MD   20 mg at 03/11/19 0837   • glucagon (human recombinant) (GLUCAGEN DIAGNOSTIC) injection 1 mg  1 mg Subcutaneous PRN Cisco Castillo MD       • insulin lispro (humaLOG) injection 0-9 Units  0-9 Units Subcutaneous Q6H Darius Sharma DO        • ipratropium-albuterol (DUO-NEB) nebulizer solution 3 mL  3 mL Nebulization 4x Daily - RT Cisco Castillo MD   3 mL at 03/11/19 0624   • labetalol (NORMODYNE,TRANDATE) injection 10 mg  10 mg Intravenous Q4H PRN Cisco Castillo MD       • LORazepam (ATIVAN) injection 1 mg  1 mg Intravenous Q4H PRN Cisco Castillo MD   1 mg at 03/10/19 2135   • meropenem (MERREM) 1 g/100 mL 0.9% NS VTB (mbp)  1 g Intravenous Q12H Cisco Castillo MD   1 g at 03/10/19 2109   • midazolam (VERSED) injection 1 mg  1 mg Intravenous Q4H PRN Claudio Paulson DO   1 mg at 03/10/19 2257   • morphine injection 4 mg  4 mg Intravenous Q3H PRN Cisco Castillo MD   4 mg at 03/10/19 2139   • ondansetron (ZOFRAN) injection 4 mg  4 mg Intravenous Q6H PRN Cisco Castillo MD       • Pharmacy to Dose meropenem (MERREM)   Does not apply Continuous PRN Cisco Castillo MD       • propofol (DIPRIVAN) infusion 10 mg/mL 100 mL  5-50 mcg/kg/min Intravenous Titrated Ger Chino MD 24.6 mL/hr at 03/11/19 0837 49.818 mcg/kg/min at 03/11/19 0837   • sodium chloride 0.9 % flush 10 mL  10 mL Intravenous PRN Ger Chino MD       • sodium chloride 0.9 % flush 3 mL  3 mL Intravenous Q12H Cisco Castillo MD   3 mL at 03/11/19 0838   • sodium chloride 0.9 % flush 3-10 mL  3-10 mL Intravenous PRN Cisco Castillo MD       • vancomycin (VANCOCIN) in iso-osmotic dextrose IVPB 1 g (premix) 200 mL  1,000 mg Intravenous Q24H Cisco Castillo MD           Past Medical History:  Past Medical History:   Diagnosis Date   • Bleeding from the nose    • Chronic kidney disease    • Diabetic polyneuropathy associated with type 1 diabetes mellitus (CMS/HCC) 12/18/2017   • GERD (gastroesophageal reflux disease)    • Hypertension    • Tobacco abuse disorder 12/18/2017   • Type 1 diabetes mellitus with severe nonproliferative retinopathy of both eyes (CMS/HCC) 12/18/2017       Past Surgical  History:  Past Surgical History:   Procedure Laterality Date   • EYE SURGERY Right    • HERNIA REPAIR     • OTHER SURGICAL HISTORY      insulin pump insertion   • SKIN GRAFT         Family History  Family History   Problem Relation Age of Onset   • Hypertension Mother        Social History  Social History     Socioeconomic History   • Marital status: Single     Spouse name: Not on file   • Number of children: Not on file   • Years of education: Not on file   • Highest education level: Not on file   Social Needs   • Financial resource strain: Not on file   • Food insecurity - worry: Not on file   • Food insecurity - inability: Not on file   • Transportation needs - medical: Not on file   • Transportation needs - non-medical: Not on file   Occupational History   • Not on file   Tobacco Use   • Smoking status: Current Every Day Smoker   • Smokeless tobacco: Never Used   Substance and Sexual Activity   • Alcohol use: Not on file   • Drug use: Defer   • Sexual activity: Defer   Other Topics Concern   • Not on file   Social History Narrative   • Not on file         Review of Systems:   unable to obtain due to intubated and sedated.    Objective:  Patient Vitals for the past 24 hrs:   BP Temp Temp src Pulse Resp SpO2   03/11/19 0800 108/73 97.7 °F (36.5 °C) Oral 77 16 94 %   03/11/19 0700 115/83 -- -- 80 16 99 %   03/11/19 0631 108/79 -- -- 79 16 100 %   03/11/19 0624 -- -- -- 77 16 99 %   03/11/19 0600 99/69 -- -- 75 -- 98 %   03/11/19 0500 115/83 -- -- 78 -- 98 %   03/11/19 0400 113/80 97.9 °F (36.6 °C) Oral 75 -- 99 %   03/11/19 0300 110/74 -- -- 76 -- 99 %   03/11/19 0201 104/65 -- -- 80 -- 99 %   03/11/19 0146 104/66 -- -- 82 -- 99 %   03/11/19 0130 106/67 -- -- 83 -- 99 %   03/11/19 0116 110/75 -- -- 85 -- 99 %   03/11/19 0101 125/82 -- -- 88 -- 98 %   03/11/19 0047 124/86 -- -- 90 -- 98 %   03/11/19 0000 -- 97.4 °F (36.3 °C) Oral -- -- --   03/10/19 2346 (!) 164/118 -- -- 100 -- 96 %   03/10/19 2332 (!) 158/115 --  -- 99 -- 93 %   03/10/19 2316 (!) 159/112 -- -- 100 -- 93 %   03/10/19 2302 (!) 168/106 -- -- 100 -- 95 %   03/10/19 2200 (!) 166/111 -- -- 97 -- 98 %   03/10/19 2100 113/81 -- -- 74 -- 100 %   03/10/19 2023 -- -- -- 73 16 100 %   03/10/19 2014 -- -- -- -- 16 100 %   03/10/19 2000 110/78 97.3 °F (36.3 °C) Oral 73 -- 100 %   03/10/19 1900 109/77 -- -- 73 -- 100 %   03/10/19 1815 102/73 -- -- 73 -- 99 %   03/10/19 1800 102/73 -- -- 73 -- --   03/10/19 1600 128/88 -- -- 80 -- --   03/10/19 1550 -- -- -- 80 16 100 %   03/10/19 1413 -- -- -- 77 16 100 %   03/10/19 1402 -- -- -- 75 -- 100 %   03/10/19 1400 116/82 -- -- 75 -- --   03/10/19 1200 116/84 -- -- 81 -- --   03/10/19 1100 111/73 -- -- 81 -- --   03/10/19 1030 129/86 -- -- 90 17 100 %   03/10/19 1020 -- -- -- 92 22 100 %   03/10/19 0950 (!) 128/104 -- -- 87 18 100 %       Intake/Output Summary (Last 24 hours) at 3/11/2019 0923  Last data filed at 3/11/2019 0900  Gross per 24 hour   Intake 2256 ml   Output 1475 ml   Net 781 ml     General: intubated, sedated   HEENT: normocephalic, atraumatic head  Neck: supple, no JVD  Chest:  clear to auscultation bilaterally without respiratory distress  CVS: regular rate and rhythm  Abdominal: soft, nontender, positive bowel sounds  Extremities: lower extremity 1+ edema, +scrotal edema  Skin: warm and dry without rash      Labs:  Results from last 7 days   Lab Units 03/11/19  0603 03/10/19  0555   WBC 10*3/mm3 6.14 11.35*   HEMOGLOBIN g/dL 8.1* 11.4*   HEMATOCRIT % 25.1* 35.3*   PLATELETS 10*3/mm3 161 234         Results from last 7 days   Lab Units 03/11/19  0340 03/10/19  1505 03/10/19  0555   SODIUM mmol/L 138 139 140   POTASSIUM mmol/L 5.8* 5.1 5.8*   CHLORIDE mmol/L 109 109 108   CO2 mmol/L 21.0* 23.0* 23.0*   BUN mg/dL 67* 66* 66*   CREATININE mg/dL 4.41* 4.37* 4.19*   CALCIUM mg/dL 8.5 8.4 8.9   BILIRUBIN mg/dL 0.3  --  0.5   ALK PHOS U/L 69  --  94   ALT (SGPT) U/L 32  --  34   AST (SGOT) U/L 20  --  49*   GLUCOSE mg/dL  188* 151* 182*       Radiology:   Imaging Results (last 72 hours)     Procedure Component Value Units Date/Time    XR Chest 1 View [[id number]] Collected:  03/11/19 0657     Updated:  03/11/19 0701    Narrative:       EXAMINATION:   XR CHEST 1 VW-  3/11/2019 6:57 AM CDT     HISTORY: Patient intubated     Frontal upright radiograph of the chest 3/11/2019 3:19 AM CDT     COMPARISON: March 10, 2019.     FINDINGS:   Again bilateral interstitial air space filling infiltrates are present.  Says appearance of pulmonary edema. This is not significantly changed  compared to prior study March 10. Increased density in the right lung  base is noted in the left lung base. This may be posterior pleural  effusions. The cardiac silhouette is normal. Infiltrate tube is  unchanged in position..      The osseous structures and surrounding soft tissues demonstrate no acute  abnormality.       Impression:       1. Persistent bilateral interstitial air space filling infiltrates. Says  appearance pulmonary edema. Bilateral pleural effusions are noted. There  is no significant improvement from March 10.        This report was finalized on 03/11/2019 06:58 by Dr. Natalio Uriostegui MD.    XR Chest 1 View [[id number]] Collected:  03/10/19 0952     Updated:  03/10/19 0956    Narrative:       HISTORY: Dyspnea     CXR: A frontal view the chest is obtained.     COMPARISON: 12/2/2012     FINDINGS: Endotracheal tube is appropriately positioned with the tip at  the T4 level. There are diffuse bilateral pulmonary opacities. Mixed  interstitial alveolar. There are small pleural effusions. Cardiac  silhouette is obscured by the pulmonary opacities. There is no  pneumothorax. There is no acute bony pathology.       Impression:       1. Diffuse mixed interstitial alveolar opacities with small pleural  effusions. Findings may be due to edema and/or pneumonia. Appropriate  positioning of the endotracheal tube.  This report was finalized on 03/10/2019 09:53 by  Dr. Teri Schwartz MD.    CT Chest Without Contrast [[id number]] Collected:  03/10/19 0758     Updated:  03/10/19 0805    Narrative:       CT CHEST WO CONTRAST- 3/10/2019 7:35 AM CDT     HISTORY: respiratory distress, eval for pna vs edema      COMPARISON: None     DOSE LENGTH PRODUCT: 477 mGy cm. Automated exposure control was also  utilized to decrease patient radiation dose.     TECHNIQUE: Axial images the chest are obtained without IV contrast     FINDINGS:  The endotracheal tube is appropriate in position with the tip  above the gabrielle. Reactive mediastinal lymph nodes measure up to 10 mm.  The thoracic aorta appears normal in caliber. The heart is borderline  prominent in size. There are small pericardial effusion is identified.  There is a small to moderate right and a small left pleural effusion.     Limited images the upper abdomen demonstrate no adrenal nodules.     There is smooth interlobular septal thickening within the aerated upper  lobes. There are scattered patchy groundglass opacities seen throughout  the lung parenchyma with dense consolidation of the lower lobes. No  discrete endobronchial lesions are identified. There is no pneumothorax.     No focal destructive bony lesions are visualized.       Impression:       1. Bilateral diffuse patchy opacities are suggestive for multifocal  pneumonia. There is also interlobular septal thickening suggesting  underlying edema. There is a small to moderate right and small left  pleural effusion with a very small pericardial effusion. Heart is  borderline enlarged. Thoracic aorta normal in caliber.  2. Appropriate positioning of the endotracheal tube.  This report was finalized on 03/10/2019 08:02 by Dr. Teri Schwartz MD.          Culture:  Blood Culture   Date Value Ref Range Status   03/10/2019 No growth at 24 hours  Preliminary   03/10/2019 No growth at 24 hours  Preliminary         Assessment   1.  Acute kidney injury; likely ATN--renal function  declining  2.  Baseline chronic kidney disease stage 4  3.  Hyperkalemia  4.  Acute hypoxic respiratory failure  5.  Metabolic acidosis  6.  Anemia of chronic kidney disease (s/p 2 units PRBC last week at [facility])  7.  Type 1 diabetes with nephropathy  8.  Essential hypertension  9.  Clinically volume overloaded    Plan:  1.  Hold IV fluids  2.  Lasix 40 mg IV once now  3.  Kayexalate via NG tube once now  4.  Workup ongoing; renal US and urine studies pending  5.  Further assessment and plan pending Dr Snyder's evaluation of patient      Thank you for the consult, we appreciate the opportunity to provide care to your patients.  Feel free to contact me if I can be of any further assistance.      Aleksandar Conteh, APRN  3/11/2019  9:23 AM

## 2019-03-11 NOTE — PROGRESS NOTES
"Pharmacy Dosing Service  Pharmacokinetics  Vancomycin Follow-up Evaluation    Assessment/Action/Plan:  Vancomycin 1250 mg IV Q24H initiated yesterday. Empiric - ordered for 3 days  Creatinine increasing (see below), will slightly decrease dose for today and follow creatinine closely - Vancomycin 1000 mg IV Q24H   Pharmacy will continue to monitor renal function and adjust dose accordingly.     Subjective:  Jose Shah is a 33 y.o. male currently on Vancomycin 1250 mg IV every 24 hours for the treatment of pneumonia, day 2 of therapy.    Objective:  Ht: 177.8 cm (70\"); Wt: 96.6 kg (212 lb 14.4 oz)  Estimated Creatinine Clearance: 27.8 mL/min (A) (by C-G formula based on SCr of 4.41 mg/dL (H)).   Lab Results   Component Value Date    CREATININE 4.41 (H) 03/11/2019    CREATININE 4.37 (H) 03/10/2019    CREATININE 4.19 (H) 03/10/2019      Lab Results   Component Value Date    WBC 6.14 03/11/2019    WBC 11.35 (H) 03/10/2019    WBC 6.54 03/30/2018       No results found for: VANCOPEAK, VANCOTROUGH, VANCORANDOM    Culture Results:  Microbiology Results (last 10 days)       Procedure Component Value - Date/Time    S. Pneumo Ag Urine or CSF - Urine, Urine, Clean Catch [[id number]]  (Normal) Collected:  03/10/19 1008    Lab Status:  Final result Specimen:  Urine, Clean Catch Updated:  03/10/19 1257     Strep Pneumo Ag Negative    Legionella Antigen, Urine - Urine, Urine, Clean Catch [[id number]]  (Normal) Collected:  03/10/19 1008    Lab Status:  Final result Specimen:  Urine, Clean Catch Updated:  03/10/19 1255     LEGIONELLA ANTIGEN, URINE Negative    Blood Culture With EMERSON - Blood, Arm, Left [[id number]] Collected:  03/10/19 0555    Lab Status:  Preliminary result Specimen:  Blood from Arm, Left Updated:  03/11/19 0730     Blood Culture No growth at 24 hours    Blood Culture With EMERSON - Blood, Arm, Left [[id number]] Collected:  03/10/19 0550    Lab Status:  Preliminary result Specimen:  Blood from Arm, Left Updated:  03/11/19 " 0730     Blood Culture No growth at 24 hours            Russ Mora, PharmD   03/11/19 8:02 AM

## 2019-03-11 NOTE — PLAN OF CARE
Problem: Patient Care Overview  Goal: Plan of Care Review  Outcome: Ongoing (interventions implemented as appropriate)   03/11/19 0539   OTHER   Outcome Summary see nursing note.    Plan of Care Review   Progress no change   Coping/Psychosocial   Plan of Care Reviewed With patient     Goal: Individualization and Mutuality  Outcome: Ongoing (interventions implemented as appropriate)    Goal: Discharge Needs Assessment  Outcome: Ongoing (interventions implemented as appropriate)    Goal: Interprofessional Rounds/Family Conf  Outcome: Ongoing (interventions implemented as appropriate)      Problem: Skin Injury Risk (Adult)  Goal: Identify Related Risk Factors and Signs and Symptoms  Outcome: Ongoing (interventions implemented as appropriate)    Goal: Skin Health and Integrity  Outcome: Ongoing (interventions implemented as appropriate)      Problem: Restraint, Nonbehavioral (Nonviolent)  Goal: Rationale and Justification  Outcome: Ongoing (interventions implemented as appropriate)    Goal: Nonbehavioral (Nonviolent) Restraint: Absence of Injury/Harm  Outcome: Ongoing (interventions implemented as appropriate)    Goal: Nonbehavioral (Nonviolent) Restraint: Achievement of Discontinuation Criteria  Outcome: Ongoing (interventions implemented as appropriate)    Goal: Nonbehavioral (Nonviolent) Restraint: Preservation of Dignity and Wellbeing  Outcome: Ongoing (interventions implemented as appropriate)      Problem: Fall Risk (Adult)  Goal: Identify Related Risk Factors and Signs and Symptoms  Outcome: Ongoing (interventions implemented as appropriate)    Goal: Absence of Fall  Outcome: Ongoing (interventions implemented as appropriate)

## 2019-03-11 NOTE — PROGRESS NOTES
Continued Stay Note   Deven     Patient Name: Jose Shah  MRN: [id number]  Today's Date: 3/11/2019    Admit Date: 3/10/2019    Discharge Plan     Row Name 03/11/19 1136       Plan    Plan  Patient currently in ICU on vent, no family present.  Will screen at another time.        Discharge Codes    No documentation.             VIRGIE SanchezW

## 2019-03-12 ENCOUNTER — APPOINTMENT (OUTPATIENT)
Dept: GENERAL RADIOLOGY | Facility: HOSPITAL | Age: 34
End: 2019-03-12

## 2019-03-12 PROBLEM — N18.9 CHRONIC KIDNEY DISEASE: Status: ACTIVE | Noted: 2019-03-10

## 2019-03-12 PROBLEM — I50.21 ACUTE SYSTOLIC CONGESTIVE HEART FAILURE (HCC): Status: ACTIVE | Noted: 2019-03-10

## 2019-03-12 LAB
ACETONE BLD QL: ABNORMAL
ALBUMIN SERPL-MCNC: 2.5 G/DL (ref 3.5–5)
ALBUMIN/GLOB SERPL: 1.2 G/DL (ref 1.1–2.5)
ALP SERPL-CCNC: 84 U/L (ref 24–120)
ALT SERPL W P-5'-P-CCNC: 28 U/L (ref 0–54)
ANION GAP SERPL CALCULATED.3IONS-SCNC: 9 MMOL/L (ref 4–13)
ARTERIAL PATENCY WRIST A: POSITIVE
AST SERPL-CCNC: 18 U/L (ref 7–45)
ATMOSPHERIC PRESS: 760 MMHG
BACTERIA SPEC RESP CULT: NORMAL
BASE EXCESS BLDA CALC-SCNC: -2.6 MMOL/L (ref 0–2)
BASOPHILS # BLD AUTO: 0.05 10*3/MM3 (ref 0–0.2)
BASOPHILS NFR BLD AUTO: 0.8 % (ref 0–2)
BDY SITE: ABNORMAL
BILIRUB SERPL-MCNC: 0.5 MG/DL (ref 0.1–1)
BODY TEMPERATURE: 37 C
BUN BLD-MCNC: 66 MG/DL (ref 5–21)
BUN/CREAT SERPL: 14.6 (ref 7–25)
CALCIUM SPEC-SCNC: 8.5 MG/DL (ref 8.4–10.4)
CHLORIDE SERPL-SCNC: 110 MMOL/L (ref 98–110)
CO2 SERPL-SCNC: 21 MMOL/L (ref 24–31)
CREAT BLD-MCNC: 4.51 MG/DL (ref 0.5–1.4)
DEPRECATED RDW RBC AUTO: 56.5 FL (ref 40–54)
EOSINOPHIL # BLD AUTO: 0.16 10*3/MM3 (ref 0–0.7)
EOSINOPHIL NFR BLD AUTO: 2.7 % (ref 0–4)
ERYTHROCYTE [DISTWIDTH] IN BLOOD BY AUTOMATED COUNT: 16.3 % (ref 12–15)
GFR SERPL CREATININE-BSD FRML MDRD: 15 ML/MIN/1.73
GLOBULIN UR ELPH-MCNC: 2.1 GM/DL
GLUCOSE BLD-MCNC: 212 MG/DL (ref 70–100)
GLUCOSE BLDC GLUCOMTR-MCNC: 114 MG/DL (ref 70–130)
GLUCOSE BLDC GLUCOMTR-MCNC: 134 MG/DL (ref 70–130)
GLUCOSE BLDC GLUCOMTR-MCNC: 170 MG/DL (ref 70–130)
GLUCOSE BLDC GLUCOMTR-MCNC: 201 MG/DL (ref 70–130)
GLUCOSE BLDC GLUCOMTR-MCNC: 259 MG/DL (ref 70–130)
GRAM STN SPEC: NORMAL
HCO3 BLDA-SCNC: 21.6 MMOL/L (ref 20–26)
HCT VFR BLD AUTO: 29.2 % (ref 40–52)
HGB BLD-MCNC: 9.4 G/DL (ref 14–18)
HOROWITZ INDEX BLD+IHG-RTO: 35 %
IMM GRANULOCYTES # BLD AUTO: 0.01 10*3/MM3 (ref 0–0.05)
IMM GRANULOCYTES NFR BLD AUTO: 0.2 % (ref 0–5)
LYMPHOCYTES # BLD AUTO: 1.24 10*3/MM3 (ref 0.72–4.86)
LYMPHOCYTES NFR BLD AUTO: 20.9 % (ref 15–45)
Lab: ABNORMAL
MAGNESIUM SERPL-MCNC: 2.2 MG/DL (ref 1.4–2.2)
MCH RBC QN AUTO: 30.3 PG (ref 28–32)
MCHC RBC AUTO-ENTMCNC: 32.2 G/DL (ref 33–36)
MCV RBC AUTO: 94.2 FL (ref 82–95)
MODALITY: ABNORMAL
MONOCYTES # BLD AUTO: 0.46 10*3/MM3 (ref 0.19–1.3)
MONOCYTES NFR BLD AUTO: 7.7 % (ref 4–12)
NEUTROPHILS # BLD AUTO: 4.02 10*3/MM3 (ref 1.87–8.4)
NEUTROPHILS NFR BLD AUTO: 67.7 % (ref 39–78)
NRBC BLD AUTO-RTO: 0 /100 WBC (ref 0–0)
PCO2 BLDA: 34.3 MM HG (ref 35–45)
PEEP RESPIRATORY: 6 CM[H2O]
PH BLDA: 7.41 PH UNITS (ref 7.35–7.45)
PHOSPHATE SERPL-MCNC: 6.3 MG/DL (ref 2.5–4.5)
PLATELET # BLD AUTO: 225 10*3/MM3 (ref 130–400)
PMV BLD AUTO: 9.6 FL (ref 6–12)
PO2 BLDA: 64.8 MM HG (ref 83–108)
POTASSIUM BLD-SCNC: 5.9 MMOL/L (ref 3.5–5.3)
PROT SERPL-MCNC: 4.6 G/DL (ref 6.3–8.7)
RBC # BLD AUTO: 3.1 10*6/MM3 (ref 4.8–5.9)
SAO2 % BLDCOA: 93.5 % (ref 94–99)
SET MECH RESP RATE: 16
SODIUM BLD-SCNC: 140 MMOL/L (ref 135–145)
VANCOMYCIN TROUGH SERPL-MCNC: 16.46 MCG/ML (ref 10–20)
VENTILATOR MODE: AC
VT ON VENT VENT: 550 ML
WBC NRBC COR # BLD: 5.94 10*3/MM3 (ref 4.8–10.8)

## 2019-03-12 PROCEDURE — 25010000002 VANCOMYCIN 10 G RECONSTITUTED SOLUTION: Performed by: INTERNAL MEDICINE

## 2019-03-12 PROCEDURE — 25010000002 IRON SUCROSE PER 1 MG: Performed by: INTERNAL MEDICINE

## 2019-03-12 PROCEDURE — 94799 UNLISTED PULMONARY SVC/PX: CPT

## 2019-03-12 PROCEDURE — 85025 COMPLETE CBC W/AUTO DIFF WBC: CPT | Performed by: INTERNAL MEDICINE

## 2019-03-12 PROCEDURE — 94770: CPT

## 2019-03-12 PROCEDURE — 36600 WITHDRAWAL OF ARTERIAL BLOOD: CPT

## 2019-03-12 PROCEDURE — 25010000002 PROPOFOL 1000 MG/ML EMULSION: Performed by: EMERGENCY MEDICINE

## 2019-03-12 PROCEDURE — 71045 X-RAY EXAM CHEST 1 VIEW: CPT

## 2019-03-12 PROCEDURE — 80202 ASSAY OF VANCOMYCIN: CPT | Performed by: INTERNAL MEDICINE

## 2019-03-12 PROCEDURE — 82803 BLOOD GASES ANY COMBINATION: CPT

## 2019-03-12 PROCEDURE — 84100 ASSAY OF PHOSPHORUS: CPT | Performed by: INTERNAL MEDICINE

## 2019-03-12 PROCEDURE — 63710000001 INSULIN LISPRO (HUMAN) PER 5 UNITS: Performed by: INTERNAL MEDICINE

## 2019-03-12 PROCEDURE — 93010 ELECTROCARDIOGRAM REPORT: CPT | Performed by: INTERNAL MEDICINE

## 2019-03-12 PROCEDURE — 99222 1ST HOSP IP/OBS MODERATE 55: CPT | Performed by: NURSE PRACTITIONER

## 2019-03-12 PROCEDURE — 82009 KETONE BODYS QUAL: CPT | Performed by: NURSE PRACTITIONER

## 2019-03-12 PROCEDURE — 82962 GLUCOSE BLOOD TEST: CPT

## 2019-03-12 PROCEDURE — 25010000002 LORAZEPAM PER 2 MG: Performed by: INTERNAL MEDICINE

## 2019-03-12 PROCEDURE — 25010000002 MORPHINE PER 10 MG: Performed by: INTERNAL MEDICINE

## 2019-03-12 PROCEDURE — 83735 ASSAY OF MAGNESIUM: CPT | Performed by: INTERNAL MEDICINE

## 2019-03-12 PROCEDURE — 94003 VENT MGMT INPAT SUBQ DAY: CPT

## 2019-03-12 PROCEDURE — 25010000002 MEROPENEM PER 100 MG: Performed by: INTERNAL MEDICINE

## 2019-03-12 PROCEDURE — 93005 ELECTROCARDIOGRAM TRACING: CPT | Performed by: INTERNAL MEDICINE

## 2019-03-12 PROCEDURE — 80053 COMPREHEN METABOLIC PANEL: CPT | Performed by: INTERNAL MEDICINE

## 2019-03-12 PROCEDURE — 25010000002 ENOXAPARIN PER 10 MG: Performed by: INTERNAL MEDICINE

## 2019-03-12 PROCEDURE — 99233 SBSQ HOSP IP/OBS HIGH 50: CPT | Performed by: INTERNAL MEDICINE

## 2019-03-12 RX ORDER — SODIUM POLYSTYRENE SULFONATE 4.1 MEQ/G
15 POWDER, FOR SUSPENSION ORAL; RECTAL ONCE
Status: COMPLETED | OUTPATIENT
Start: 2019-03-12 | End: 2019-03-12

## 2019-03-12 RX ORDER — BUMETANIDE 0.25 MG/ML
1 INJECTION INTRAMUSCULAR; INTRAVENOUS ONCE
Status: COMPLETED | OUTPATIENT
Start: 2019-03-12 | End: 2019-03-12

## 2019-03-12 RX ORDER — SODIUM POLYSTYRENE SULFONATE 15 G/60ML
15 SUSPENSION ORAL; RECTAL ONCE
Status: DISCONTINUED | OUTPATIENT
Start: 2019-03-12 | End: 2019-03-12 | Stop reason: RX

## 2019-03-12 RX ADMIN — SODIUM POLYSTYRENE SULFONATE 15 G: 1 POWDER ORAL; RECTAL at 14:19

## 2019-03-12 RX ADMIN — PROPOFOL 50 MCG/KG/MIN: 10 INJECTION, EMULSION INTRAVENOUS at 15:32

## 2019-03-12 RX ADMIN — MEROPENEM 1 G: 1 INJECTION, POWDER, FOR SOLUTION INTRAVENOUS at 09:36

## 2019-03-12 RX ADMIN — LORAZEPAM 1 MG: 2 INJECTION INTRAMUSCULAR; INTRAVENOUS at 02:23

## 2019-03-12 RX ADMIN — IPRATROPIUM BROMIDE AND ALBUTEROL SULFATE 3 ML: 2.5; .5 SOLUTION RESPIRATORY (INHALATION) at 07:02

## 2019-03-12 RX ADMIN — SODIUM CHLORIDE, PRESERVATIVE FREE 3 ML: 5 INJECTION INTRAVENOUS at 08:55

## 2019-03-12 RX ADMIN — PROPOFOL 50 MCG/KG/MIN: 10 INJECTION, EMULSION INTRAVENOUS at 22:04

## 2019-03-12 RX ADMIN — MORPHINE SULFATE 4 MG: 4 INJECTION, SOLUTION INTRAMUSCULAR; INTRAVENOUS at 05:41

## 2019-03-12 RX ADMIN — FAMOTIDINE 20 MG: 10 INJECTION INTRAVENOUS at 08:50

## 2019-03-12 RX ADMIN — IPRATROPIUM BROMIDE AND ALBUTEROL SULFATE 3 ML: 2.5; .5 SOLUTION RESPIRATORY (INHALATION) at 21:04

## 2019-03-12 RX ADMIN — INSULIN LISPRO 2 UNITS: 100 INJECTION, SOLUTION INTRAVENOUS; SUBCUTANEOUS at 11:47

## 2019-03-12 RX ADMIN — BUMETANIDE 1 MG: 0.25 INJECTION INTRAMUSCULAR; INTRAVENOUS at 10:14

## 2019-03-12 RX ADMIN — CHLORHEXIDINE GLUCONATE 15 ML: 1.2 RINSE ORAL at 20:36

## 2019-03-12 RX ADMIN — SODIUM BICARBONATE: 84 INJECTION, SOLUTION INTRAVENOUS at 09:35

## 2019-03-12 RX ADMIN — PROPOFOL 50 MCG/KG/MIN: 10 INJECTION, EMULSION INTRAVENOUS at 05:06

## 2019-03-12 RX ADMIN — PROPOFOL 50 MCG/KG/MIN: 10 INJECTION, EMULSION INTRAVENOUS at 08:49

## 2019-03-12 RX ADMIN — SODIUM CHLORIDE, PRESERVATIVE FREE 3 ML: 5 INJECTION INTRAVENOUS at 20:36

## 2019-03-12 RX ADMIN — INSULIN LISPRO 6 UNITS: 100 INJECTION, SOLUTION INTRAVENOUS; SUBCUTANEOUS at 06:13

## 2019-03-12 RX ADMIN — PROPOFOL 50 MCG/KG/MIN: 10 INJECTION, EMULSION INTRAVENOUS at 19:24

## 2019-03-12 RX ADMIN — PROPOFOL 50 MCG/KG/MIN: 10 INJECTION, EMULSION INTRAVENOUS at 11:39

## 2019-03-12 RX ADMIN — LORAZEPAM 1 MG: 2 INJECTION INTRAMUSCULAR; INTRAVENOUS at 11:47

## 2019-03-12 RX ADMIN — INSULIN LISPRO 4 UNITS: 100 INJECTION, SOLUTION INTRAVENOUS; SUBCUTANEOUS at 23:52

## 2019-03-12 RX ADMIN — IPRATROPIUM BROMIDE AND ALBUTEROL SULFATE 3 ML: 2.5; .5 SOLUTION RESPIRATORY (INHALATION) at 15:07

## 2019-03-12 RX ADMIN — ENOXAPARIN SODIUM 30 MG: 30 INJECTION SUBCUTANEOUS at 08:50

## 2019-03-12 RX ADMIN — IPRATROPIUM BROMIDE AND ALBUTEROL SULFATE 3 ML: 2.5; .5 SOLUTION RESPIRATORY (INHALATION) at 10:42

## 2019-03-12 RX ADMIN — IRON SUCROSE 200 MG: 20 INJECTION, SOLUTION INTRAVENOUS at 14:19

## 2019-03-12 RX ADMIN — CHLORHEXIDINE GLUCONATE 15 ML: 1.2 RINSE ORAL at 10:14

## 2019-03-12 RX ADMIN — VANCOMYCIN HYDROCHLORIDE 750 MG: 10 INJECTION, POWDER, LYOPHILIZED, FOR SOLUTION INTRAVENOUS at 17:19

## 2019-03-12 RX ADMIN — PROPOFOL 50 MCG/KG/MIN: 10 INJECTION, EMULSION INTRAVENOUS at 01:22

## 2019-03-12 RX ADMIN — MEROPENEM 1 G: 1 INJECTION, POWDER, FOR SOLUTION INTRAVENOUS at 22:04

## 2019-03-12 NOTE — PLAN OF CARE
Problem: Patient Care Overview  Goal: Plan of Care Review  Outcome: Ongoing (interventions implemented as appropriate)   03/12/19 0517   OTHER   Outcome Summary no major events throughout night. ativan administered x2 doses. Pt is able to follow commands without sedation vacation, propofol max at 50.    Plan of Care Review   Progress no change   Coping/Psychosocial   Plan of Care Reviewed With patient     Goal: Individualization and Mutuality  Outcome: Ongoing (interventions implemented as appropriate)    Goal: Discharge Needs Assessment  Outcome: Ongoing (interventions implemented as appropriate)    Goal: Interprofessional Rounds/Family Conf  Outcome: Ongoing (interventions implemented as appropriate)      Problem: Skin Injury Risk (Adult)  Goal: Identify Related Risk Factors and Signs and Symptoms  Outcome: Ongoing (interventions implemented as appropriate)    Goal: Skin Health and Integrity  Outcome: Ongoing (interventions implemented as appropriate)      Problem: Restraint, Nonbehavioral (Nonviolent)  Goal: Rationale and Justification  Outcome: Ongoing (interventions implemented as appropriate)    Goal: Nonbehavioral (Nonviolent) Restraint: Absence of Injury/Harm  Outcome: Ongoing (interventions implemented as appropriate)    Goal: Nonbehavioral (Nonviolent) Restraint: Achievement of Discontinuation Criteria  Outcome: Ongoing (interventions implemented as appropriate)    Goal: Nonbehavioral (Nonviolent) Restraint: Preservation of Dignity and Wellbeing  Outcome: Ongoing (interventions implemented as appropriate)      Problem: Fall Risk (Adult)  Goal: Identify Related Risk Factors and Signs and Symptoms  Outcome: Ongoing (interventions implemented as appropriate)    Goal: Absence of Fall  Outcome: Ongoing (interventions implemented as appropriate)

## 2019-03-12 NOTE — CONSULTS
Jose Shah  [id number]  [id number]  C004/1  Darius Sharma DO  3/10/2019    Chief Complaint   Patient presents with   • Respiratory Distress       HPI: Jose Shah is a 33 y.o. male who presented with complaints of shortness of breath and acute respiratory failure.  He was recently discharged from [facility] and diagnosed with pneumonia.  He did have progressively worsening shortness of breath after discharge.  Per notes he had complaints of chest pain and chest pressure.  He presented for further workup and management and was found to be in respiratory distress with altered mental status.  They did proceed with intubation and mechanical ventilation.  He is sedated and unable to provide any history.  There is no family at bedside.  He does have a history of noncompliance and has been type I diabetic since 13 years old.  He has a wound on his left foot being managed in wound care.  He is also a smoker.  He does have chronic kidney disease with worsening kidney function.  His creatinine is 4.51 with a GFR of 15.  We have been consulted for PermCath placement to begin dialysis.    Past Medical History:   Diagnosis Date   • Bleeding from the nose    • Chronic kidney disease    • Diabetic polyneuropathy associated with type 1 diabetes mellitus (CMS/[facility]) 12/18/2017   • GERD (gastroesophageal reflux disease)    • Hypertension    • Tobacco abuse disorder 12/18/2017   • Type 1 diabetes mellitus with severe nonproliferative retinopathy of both eyes (CMS/[facility]) 12/18/2017       Past Surgical History:   Procedure Laterality Date   • EYE SURGERY Right    • HERNIA REPAIR     • OTHER SURGICAL HISTORY      insulin pump insertion   • SKIN GRAFT         Family History   Problem Relation Age of Onset   • Hypertension Mother        Social History     Socioeconomic History   • Marital status: Single     Spouse name: Not on file   • Number of children: Not on file   • Years of education: Not on file   • Highest  education level: Not on file   Social Needs   • Financial resource strain: Not on file   • Food insecurity - worry: Not on file   • Food insecurity - inability: Not on file   • Transportation needs - medical: Not on file   • Transportation needs - non-medical: Not on file   Occupational History   • Not on file   Tobacco Use   • Smoking status: Current Every Day Smoker   • Smokeless tobacco: Never Used   Substance and Sexual Activity   • Alcohol use: Not on file   • Drug use: Defer   • Sexual activity: Defer   Other Topics Concern   • Not on file   Social History Narrative   • Not on file       Allergies   Allergen Reactions   • Penicillins Unknown (See Comments)     Has been allergic since a child       Hospital Medications (active)       Dose Frequency Start End    bumetanide (BUMEX) injection 1 mg 1 mg Once 3/11/2019 3/11/2019    Sig - Route: Infuse 4 mL into a venous catheter 1 (One) Time. - Intravenous    bumetanide (BUMEX) injection 1 mg 1 mg Once 3/12/2019 3/12/2019    Sig - Route: Infuse 4 mL into a venous catheter 1 (One) Time. - Intravenous    chlorhexidine (PERIDEX) 0.12 % solution 15 mL 15 mL Every 12 Hours Scheduled 3/10/2019     Sig - Route: Apply 15 mL to the mouth or throat Every 12 (Twelve) Hours. - Mouth/Throat    dextrose (D50W) 25 g/ 50mL Intravenous Solution 25 g 25 g Every 15 Minutes PRN 3/10/2019     Sig - Route: Infuse 50 mL into a venous catheter Every 15 (Fifteen) Minutes As Needed for Low Blood Sugar (Blood Sugar Less Than 70). - Intravenous    dextrose (GLUTOSE) oral gel 15 g 15 g Every 15 Minutes PRN 3/10/2019     Sig - Route: Take 15 g by mouth Every 15 (Fifteen) Minutes As Needed for Low Blood Sugar (Blood sugar less than 70). - Oral    enoxaparin (LOVENOX) syringe 30 mg 30 mg Every 24 Hours 3/10/2019     Sig - Route: Inject 0.3 mL under the skin into the appropriate area as directed Daily. - Subcutaneous    famotidine (PEPCID) injection 20 mg 20 mg Daily 3/10/2019     Sig - Route:  Infuse 2 mL into a venous catheter Daily. - Intravenous    glucagon (human recombinant) (GLUCAGEN DIAGNOSTIC) injection 1 mg 1 mg As Needed 3/10/2019     Sig - Route: Inject 1 mg under the skin into the appropriate area as directed As Needed (Blood Glucose Less Than 70). - Subcutaneous    insulin lispro (humaLOG) injection 2-9 Units 2-9 Units Every 6 Hours 3/11/2019     Sig - Route: Inject 2-9 Units under the skin into the appropriate area as directed Every 6 (Six) Hours. - Subcutaneous    ipratropium-albuterol (DUO-NEB) nebulizer solution 3 mL 3 mL 4 Times Daily - RT 3/10/2019     Sig - Route: Take 3 mL by nebulization 4 (Four) Times a Day. - Nebulization    iron sucrose (VENOFER) 200 mg in sodium chloride 0.9 % 100 mL IVPB 200 mg Every 24 Hours 3/12/2019 3/16/2019    Sig - Route: Infuse 200 mg into a venous catheter Daily. - Intravenous    labetalol (NORMODYNE,TRANDATE) injection 10 mg 10 mg Every 4 Hours PRN 3/10/2019     Sig - Route: Infuse 2 mL into a venous catheter Every 4 (Four) Hours As Needed for High Blood Pressure. - Intravenous    LORazepam (ATIVAN) injection 1 mg 1 mg Every 4 Hours PRN 3/10/2019 3/20/2019    Sig - Route: Infuse 0.5 mL into a venous catheter Every 4 (Four) Hours As Needed for Anxiety (assist with sedation while on vent). - Intravenous    meropenem (MERREM) 1 g/100 mL 0.9% NS VTB (mbp) 1 g Every 12 Hours 3/10/2019 3/13/2019    Sig - Route: Infuse 100 mL into a venous catheter Every 12 (Twelve) Hours. - Intravenous    midazolam (VERSED) injection 1 mg 1 mg Every 4 Hours PRN 3/10/2019     Sig - Route: Infuse 1 mL into a venous catheter Every 4 (Four) Hours As Needed for Anxiety or Sedation. - Intravenous    morphine injection 4 mg 4 mg Every 3 Hours PRN 3/10/2019 3/20/2019    Sig - Route: Infuse 1 mL into a venous catheter Every 3 (Three) Hours As Needed for Severe Pain  (assist with sedation while on ventilator). - Intravenous    ondansetron (ZOFRAN) injection 4 mg 4 mg Every 6 Hours PRN  3/10/2019     Sig - Route: Infuse 2 mL into a venous catheter Every 6 (Six) Hours As Needed for Nausea or Vomiting. - Intravenous    Pharmacy to Dose meropenem (MERREM)  Continuous PRN 3/10/2019 3/13/2019    Sig - Route: Continuous As Needed for Consult. - Does not apply    propofol (DIPRIVAN) infusion 10 mg/mL 100 mL 5-50 mcg/kg/min × 82.3 kg Titrated 3/10/2019     Sig - Route: Infuse 411.5-4,115 mcg/min into a venous catheter Dose Adjusted By Provider As Needed. - Intravenous    sodium chloride 0.9 % flush 10 mL 10 mL As Needed 3/10/2019     Sig - Route: Infuse 10 mL into a venous catheter As Needed for Line Care. - Intravenous    sodium chloride 0.9 % flush 3 mL 3 mL Every 12 Hours Scheduled 3/10/2019     Sig - Route: Infuse 3 mL into a venous catheter Every 12 (Twelve) Hours. - Intravenous    sodium chloride 0.9 % flush 3-10 mL 3-10 mL As Needed 3/10/2019     Sig - Route: Infuse 3-10 mL into a venous catheter As Needed for Line Care. - Intravenous    sodium polystyrene (KAYEXALATE) powder 15 g 15 g Once 3/12/2019     Sig - Route: Take 15 g by mouth 1 (One) Time. - Oral    vancomycin (VANCOCIN) in iso-osmotic dextrose IVPB 1 g (premix) 200 mL 1,000 mg Every 24 Hours 3/11/2019 3/13/2019    Sig - Route: Infuse 200 mL into a venous catheter Daily. - Intravenous    insulin lispro (humaLOG) injection 0-9 Units (Discontinued) 0-9 Units Every 6 Hours 3/11/2019 3/11/2019    Sig - Route: Inject 0-9 Units under the skin into the appropriate area as directed Every 6 (Six) Hours. - Subcutaneous    sodium chloride 0.45 % 925 mL with sodium bicarbonate 8.4 % 75 mEq infusion (Discontinued)  Continuous 3/11/2019 3/12/2019    Sig - Route: Infuse  into a venous catheter Continuous. - Intravenous    sodium polystyrene (KAYEXALATE) 15 GM/60ML suspension 15 g (Discontinued) 15 g Once 3/12/2019 3/12/2019    Sig - Route: Take 60 mL by mouth 1 (One) Time. - Oral    Reason for Discontinue: Availability          Review of Systems   Unable  "to perform ROS: Intubated       /76   Pulse 99   Temp 98.1 °F (36.7 °C) (Oral)   Resp 16   Ht 177.8 cm (70\")   Wt 96.6 kg (212 lb 14.4 oz)   SpO2 96%   BMI 30.55 kg/m²    Physical Exam   Constitutional: He is oriented to person, place, and time. Vital signs are normal. He appears ill. He is sedated and intubated.   Intubated and sedated   HENT:   Head: Normocephalic and atraumatic.   Eyes: Pupils are equal, round, and reactive to light. No scleral icterus.   Neck: Normal range of motion. Neck supple. No thyromegaly present.   Cardiovascular: Normal rate, regular rhythm, normal heart sounds and intact distal pulses.   Pulmonary/Chest: Effort normal. He is intubated. He has wheezes. He has rales.   Abdominal: Soft. Bowel sounds are normal.   Musculoskeletal: Normal range of motion.   Neurological: He is oriented to person, place, and time.   Skin: Skin is warm and dry.   Psychiatric: He has a normal mood and affect. His behavior is normal. Judgment and thought content normal.   Nursing note and vitals reviewed.      Laboratory Data:  Results from last 7 days   Lab Units 03/12/19  0335 03/11/19  0603 03/10/19  0555   WBC 10*3/mm3 5.94 6.14 11.35*   HEMOGLOBIN g/dL 9.4* 8.1* 11.4*   HEMATOCRIT % 29.2* 25.1* 35.3*   PLATELETS 10*3/mm3 225 161 234       Results from last 7 days   Lab Units 03/12/19  0335 03/11/19  0340 03/10/19  1505 03/10/19  0555   SODIUM mmol/L 140 138 139 140   POTASSIUM mmol/L 5.9* 5.8* 5.1 5.8*   CHLORIDE mmol/L 110 109 109 108   CO2 mmol/L 21.0* 21.0* 23.0* 23.0*   BUN mg/dL 66* 67* 66* 66*   CREATININE mg/dL 4.51* 4.41* 4.37* 4.19*   CALCIUM mg/dL 8.5 8.5 8.4 8.9   BILIRUBIN mg/dL 0.5 0.3  --  0.5   ALK PHOS U/L 84 69  --  94   ALT (SGPT) U/L 28 32  --  34   AST (SGOT) U/L 18 20  --  49*   GLUCOSE mg/dL 212* 188* 151* 182*     Results from last 7 days   Lab Units 03/10/19  0555   INR  0.92   APTT seconds 30.7         Diagnostic Data:  Imaging Results (last 24 hours)     Procedure " Component Value Units Date/Time    XR Chest 1 View [[id number]] Collected:  03/12/19 0731     Updated:  03/12/19 0736    Narrative:       EXAMINATION: XR CHEST 1 VW-     3/12/2019 3:40 AM CDT     HISTORY: Intubated Patient; J96.01-Acute respiratory failure with  hypoxia; J96.02-Acute respiratory failure with hypercapnia;  N18.9-Chronic kidney disease, unspecified; E87.5-Hyperkalemia;  E87.70-Fluid overload, unspecified; D64.9-Anemia, unspecified.     One view chest x-ray compared with yesterday.     Endotracheal tube remains in good position. The tip is approximately 5  cm above the gabrielle.  A nasogastric tube has been placed and is in good position.     Heart size is unchanged.     Bibasilar consolidation is the same or slightly worse. No improvement.     No pneumothorax.     Summary:  1. No significant change.  This report was finalized on 03/12/2019 07:33 by Dr. Papito Oglesby MD.    US Renal Bilateral [[id number]] Collected:  03/11/19 1515     Updated:  03/11/19 1519    Narrative:       EXAMINATION: US RENAL BILATERAL-     3/11/2019 1:40 PM CDT     HISTORY: CAMILO; J96.01-Acute respiratory failure with hypoxia;  J96.02-Acute respiratory failure with hypercapnia; N18.9-Chronic kidney  disease, unspecified; E87.5-Hyperkalemia; E87.70-Fluid overload,  unspecified; D64.9-Anemia, unspecified.     Detail is limited. There is no hydronephrosis.  Cortical thickness and cortical echogenicity is appropriate.     Right kidney = 1 39 x 51 x 68 mm.  Left kidney = 1 38 x 62 x 63 mm.     Pleural fluid is incidentally noted.     Summary:  1. Symmetric kidneys with no obstruction.  This report was finalized on 03/11/2019 15:16 by Dr. Papito Oglesby MD.          Impression:    Acute respiratory failure with hypoxia and hypercapnia (CMS/HCC)    Type 1 diabetes, uncontrolled, with gastroparesis (CMS/HCC)    Diabetic polyneuropathy associated with type 1 diabetes mellitus (CMS/HCC)    Tobacco abuse disorder    Acute systolic congestive  heart failure (CMS/HCC)    Acute kidney injury (CMS/HCC)    Hyperkalemia    Anemia    History of recent pneumonia    Diabetic ulcer of toe of left foot associated with type 1 diabetes mellitus, limited to breakdown of skin (CMS/HCC)      Plan: After thoroughly evaluating Jose HARDWICK Shah, I believe the best course of action is to proceed with PermCath placement by Dr. Avila tomorrow.The risks and benefits were explained at great length to the patient which include but are not limited to bleeding, infection, vessel damage, nerve damage and pneumothorax. The family understands the risks and wishes for me to proceed.    Thank you for allowing me to participate in the care of your patient.  Please do not hesitate to call with any questions or concerns.      Diann Naranjo, APRN   Vascular Surgery  [phone]

## 2019-03-12 NOTE — PROGRESS NOTES
"Pharmacy Dosing Service  Pharmacokinetics  Vancomycin Follow-up Evaluation    Assessment/Action/Plan:  Current order Vancomycin 1000 mg IV Q24H, last dose administered 3/11 ~12:30pm  Worsened renal function/CAMILO, noted plans for PermCath placement tomorrow to begin dialysis  Unsure at this time of dialysis regimen or timing  Will begin \"pulse\" dosing Vancomycin until dialysis regimen is started  --Vancomycin level today (after two doses of Q24H, one 1250 and one 1000) - 16.46  --expect more accumulation, plus worsening/unstable renal function  Will give Vancomycin 750 mg IV Once tonight and pharmacy will follow up tomorrow to determine next dosing strategy at that time.  A Random Vancomycin level has been placed for tomorrow to give picture of levels ~ 24 hours post-dose, however this level time may be adjusted as more information comes available regarding dialysis plan  Pharmacy will continue to monitor renal function and adjust dose accordingly.     Subjective:  Jose Shah is a 33 y.o. male currently on Vancomycin  IV pulse dosing (last dose received 3/11 @ 12:32PM) for the treatment of pneumonia, day 3 of therapy.    Objective:  Ht: 177.8 cm (70\"); Wt: 96.6 kg (212 lb 14.4 oz)  Estimated Creatinine Clearance: 27.2 mL/min (A) (by C-G formula based on SCr of 4.51 mg/dL (H)).   Lab Results   Component Value Date    CREATININE 4.51 (H) 03/12/2019    CREATININE 4.41 (H) 03/11/2019    CREATININE 4.37 (H) 03/10/2019      Lab Results   Component Value Date    WBC 5.94 03/12/2019    WBC 6.14 03/11/2019    WBC 11.35 (H) 03/10/2019         Lab Results   Component Value Date    VANCOTROUGH 16.46 03/12/2019       Culture Results:  Microbiology Results (last 10 days)       Procedure Component Value - Date/Time    Respiratory Culture - Sputum, Bronchus [[id number]] Collected:  03/10/19 1035    Lab Status:  Final result Specimen:  Sputum from Bronchus Updated:  03/12/19 0975     Respiratory Culture Light growth (2+) Normal " Respiratory Jaja     Gram Stain Greater than 25 WBCs per low power field      Rare (1+) Epithelial cells per low power field      Rare (1+) Mixed gram positive jaja    S. Pneumo Ag Urine or CSF - Urine, Urine, Clean Catch [[id number]]  (Normal) Collected:  03/10/19 1008    Lab Status:  Final result Specimen:  Urine, Clean Catch Updated:  03/10/19 1257     Strep Pneumo Ag Negative    Legionella Antigen, Urine - Urine, Urine, Clean Catch [[id number]]  (Normal) Collected:  03/10/19 1008    Lab Status:  Final result Specimen:  Urine, Clean Catch Updated:  03/10/19 1255     LEGIONELLA ANTIGEN, URINE Negative    Blood Culture With EMERSON - Blood, Arm, Left [[id number]] Collected:  03/10/19 0555    Lab Status:  Preliminary result Specimen:  Blood from Arm, Left Updated:  03/12/19 0730     Blood Culture No growth at 2 days    Blood Culture With EMERSON - Blood, Arm, Left [[id number]] Collected:  03/10/19 0550    Lab Status:  Preliminary result Specimen:  Blood from Arm, Left Updated:  03/12/19 0730     Blood Culture No growth at 2 days            Russ Mora, PharmD   03/12/19 4:11 PM

## 2019-03-12 NOTE — PROGRESS NOTES
Nephrology ([facility]) Progress Note      Patient:  Jose Shah  YOB: 1985  Date of Service: 3/12/2019  MRN: [id number]   Acct: [id number]   Primary Care Physician: Dai Boston APRN  Advance Directive:   Code Status and Medical Interventions:   Ordered at: 03/10/19 0743     Code Status:    CPR     Medical Interventions (Level of Support Prior to Arrest):    Full     Admit Date: 3/10/2019       Hospital Day: 2  Referring Provider: No ref. provider found      Patient personally seen and examined.  Complete chart including Consults, Notes, Operative Reports, Labs, Cardiology, and Radiology studies reviewed as able.        Subjective:  Jose Shah is a 33 y.o. male  whom we were consulted for acute kidney injury and hyperkalemia.  Baseline chronic kidney disease stage 4; follows with Dr Yan in [place], [place].  History of type I diabetes with noncompliance in the past, Lopez's esophagus, hypertension, COPD, tobacco abuse.  Recent piror admission at [facility] in [place] for pneumonia. Discharged from their facility on 3/09; and presented to [facility] emergency department on 3/10.  Presented with increased dyspnea, altered mental status. Required intubation and mechanical ventilation shortly after arrival.  Hospital course remarkable for good response to IV diuretics but has persistently elevated potassium level. Renal function not improving.    Today remains intubated.  No response to Kayexalate yesterday. Urine output nonoliguric      Allergies:  Penicillins    Home Meds:  Medications Prior to Admission   Medication Sig Dispense Refill Last Dose   • CloNIDine (CATAPRES) 0.1 MG tablet Take 0.1 mg by mouth Every 8 (Eight) Hours.      • famotidine (PEPCID) 20 MG tablet Take 20 mg by mouth 2 (Two) Times a Day.      • hydrALAZINE (APRESOLINE) 25 MG tablet Take 25 mg by mouth Every 6 (Six) Hours As Needed.      • metoprolol tartrate (LOPRESSOR) 50  MG tablet Take 50 mg by mouth 2 (Two) Times a Day.      • Alcohol Swabs (ALCOHOL WIPES) 70 % pads Use 4 x daily 120 each 11 Taking   • amLODIPine (NORVASC) 5 MG tablet Take 5 mg by mouth Daily.   2/28/2019 at Unknown time   • atorvastatin (LIPITOR) 80 MG tablet Take 80 mg by mouth Daily.   2/28/2019 at Unknown time   • Blood Glucose Monitoring Suppl w/Device kit USE AS INDICATED, ANY MONITOR 1 each 1 Taking   • Glucose Blood (BLOOD GLUCOSE TEST) strip Use 4 x daily, use any brand covered by insurance or same brand as before 120 each 11 Taking   • insulin aspart (NOVOLOG) 100 UNIT/ML injection USE AS DIRECTED UP  UNITS DAILY 5 each 5 3/1/2019 at Unknown time   • Lancet Devices (LANCING DEVICE) misc USE AS INDICATED TO CORRELATE WITH STRIPS AND METER 1 each 1 Taking   • Lancets 30G misc USE 4 X DAILY 120 each 11 Taking   • metoclopramide (REGLAN) 10 MG tablet Take 10 mg by mouth 3 (Three) Times a Day Before Meals.   2/28/2019 at Unknown time   • pantoprazole (PROTONIX) 40 MG EC tablet Take 40 mg by mouth 2 (Two) Times a Day.   2/28/2019 at Unknown time   • Urine Glucose-Ketones Test strip 1 each by In Vitro route As Needed (elevated glucose). 100 each 11 Taking       Medicines:  Current Facility-Administered Medications   Medication Dose Route Frequency Provider Last Rate Last Dose   • chlorhexidine (PERIDEX) 0.12 % solution 15 mL  15 mL Mouth/Throat Q12H Cisco Castillo MD   15 mL at 03/11/19 2006   • dextrose (D50W) 25 g/ 50mL Intravenous Solution 25 g  25 g Intravenous Q15 Min PRN Cisco Castillo MD       • dextrose (GLUTOSE) oral gel 15 g  15 g Oral Q15 Min PRN Cisco Castillo MD       • enoxaparin (LOVENOX) syringe 30 mg  30 mg Subcutaneous Q24H Cisco Castillo MD   30 mg at 03/12/19 0850   • famotidine (PEPCID) injection 20 mg  20 mg Intravenous Daily Cisco Castillo MD   20 mg at 03/12/19 0850   • glucagon (human recombinant) (GLUCAGEN DIAGNOSTIC) injection 1 mg  1 mg  Subcutaneous PRN Cisco Castillo MD       • insulin lispro (humaLOG) injection 2-9 Units  2-9 Units Subcutaneous Q6H Darius Sharma DO   6 Units at 03/12/19 0613   • ipratropium-albuterol (DUO-NEB) nebulizer solution 3 mL  3 mL Nebulization 4x Daily - RT Cisco Castillo MD   3 mL at 03/12/19 0702   • labetalol (NORMODYNE,TRANDATE) injection 10 mg  10 mg Intravenous Q4H PRN Cisco Castillo MD       • LORazepam (ATIVAN) injection 1 mg  1 mg Intravenous Q4H PRN Cisco Castillo MD   1 mg at 03/12/19 0223   • meropenem (MERREM) 1 g/100 mL 0.9% NS VTB (mbp)  1 g Intravenous Q12H Cisco Castillo MD   1 g at 03/11/19 2152   • midazolam (VERSED) injection 1 mg  1 mg Intravenous Q4H PRN Claudio Paulson DO   1 mg at 03/10/19 2257   • morphine injection 4 mg  4 mg Intravenous Q3H PRN Cisco Castillo MD   4 mg at 03/12/19 0541   • ondansetron (ZOFRAN) injection 4 mg  4 mg Intravenous Q6H PRN Cisco Castillo MD       • Pharmacy to Dose meropenem (MERREM)   Does not apply Continuous PRN Cisco Castillo MD       • propofol (DIPRIVAN) infusion 10 mg/mL 100 mL  5-50 mcg/kg/min Intravenous Titrated Ger Chino MD 24.7 mL/hr at 03/12/19 0849 50 mcg/kg/min at 03/12/19 0849   • sodium chloride 0.45 % 925 mL with sodium bicarbonate 8.4 % 75 mEq infusion   Intravenous Continuous Marcelo Snyder MD 50 mL/hr at 03/11/19 1442     • sodium chloride 0.9 % flush 10 mL  10 mL Intravenous PRN Ger Chino MD       • sodium chloride 0.9 % flush 3 mL  3 mL Intravenous Q12H Cisco Castillo MD   3 mL at 03/12/19 0855   • sodium chloride 0.9 % flush 3-10 mL  3-10 mL Intravenous PRN Cisco Castillo MD       • vancomycin (VANCOCIN) in iso-osmotic dextrose IVPB 1 g (premix) 200 mL  1,000 mg Intravenous Q24H Cisco Castillo MD   1,000 mg at 03/11/19 1232       Past Medical History:  Past Medical History:   Diagnosis Date   • Bleeding from the nose    • Chronic kidney  disease    • Diabetic polyneuropathy associated with type 1 diabetes mellitus (CMS/HCC) 12/18/2017   • GERD (gastroesophageal reflux disease)    • Hypertension    • Tobacco abuse disorder 12/18/2017   • Type 1 diabetes mellitus with severe nonproliferative retinopathy of both eyes (CMS/HCC) 12/18/2017       Past Surgical History:  Past Surgical History:   Procedure Laterality Date   • EYE SURGERY Right    • HERNIA REPAIR     • OTHER SURGICAL HISTORY      insulin pump insertion   • SKIN GRAFT         Family History  Family History   Problem Relation Age of Onset   • Hypertension Mother        Social History  Social History     Socioeconomic History   • Marital status: Single     Spouse name: Not on file   • Number of children: Not on file   • Years of education: Not on file   • Highest education level: Not on file   Social Needs   • Financial resource strain: Not on file   • Food insecurity - worry: Not on file   • Food insecurity - inability: Not on file   • Transportation needs - medical: Not on file   • Transportation needs - non-medical: Not on file   Occupational History   • Not on file   Tobacco Use   • Smoking status: Current Every Day Smoker   • Smokeless tobacco: Never Used   Substance and Sexual Activity   • Alcohol use: Not on file   • Drug use: Defer   • Sexual activity: Defer   Other Topics Concern   • Not on file   Social History Narrative   • Not on file         Review of Systems:  Unable to obtain due to intubated and sedated    Objective:  Patient Vitals for the past 24 hrs:   BP Temp Temp src Pulse Resp SpO2   03/12/19 0804 123/74 -- -- 100 -- 94 %   03/12/19 0710 -- -- -- 103 16 96 %   03/12/19 0702 123/74 -- -- 103 16 94 %   03/12/19 0700 123/74 -- -- 102 -- 94 %   03/12/19 0600 146/85 -- -- 107 -- 95 %   03/12/19 0500 145/92 -- -- 100 -- 96 %   03/12/19 0400 145/92 98 °F (36.7 °C) Axillary 97 -- 97 %   03/12/19 0315 149/96 -- -- 100 -- 96 %   03/12/19 0202 147/92 -- -- 96 -- 96 %   03/12/19 0100  148/96 -- -- 94 -- 96 %   03/12/19 0000 121/82 97.7 °F (36.5 °C) Axillary 86 -- 97 %   03/11/19 2300 115/79 -- -- 85 -- 96 %   03/11/19 2200 118/77 -- -- 87 -- 95 %   03/11/19 2100 113/80 -- -- 90 -- 95 %   03/11/19 2037 -- -- -- 90 -- 99 %   03/11/19 2031 -- -- -- 90 16 96 %   03/11/19 2000 132/92 97.3 °F (36.3 °C) Axillary 95 -- 96 %   03/11/19 1900 133/96 -- -- 91 -- 97 %   03/11/19 1815 131/89 -- -- 92 16 97 %   03/11/19 1800 131/90 -- -- 91 16 --   03/11/19 1700 132/87 -- -- 91 16 97 %   03/11/19 1600 141/95 97.7 °F (36.5 °C) Oral 95 16 96 %   03/11/19 1505 145/90 -- -- 92 16 96 %   03/11/19 1416 150/98 -- -- 88 16 99 %   03/11/19 1408 -- -- -- 87 16 99 %   03/11/19 1405 145/96 -- -- 90 18 97 %   03/11/19 1315 147/97 -- -- 87 16 95 %   03/11/19 1200 114/76 97.8 °F (36.6 °C) Oral 80 16 96 %   03/11/19 1100 120/81 -- -- 80 16 97 %   03/11/19 1026 -- -- -- 80 16 99 %   03/11/19 1019 -- -- -- 78 16 100 %   03/11/19 1000 114/79 -- -- 78 16 96 %   03/11/19 0900 108/76 -- -- 77 16 95 %       Intake/Output Summary (Last 24 hours) at 3/12/2019 0857  Last data filed at 3/12/2019 0400  Gross per 24 hour   Intake 1846 ml   Output 3675 ml   Net -1829 ml     General: intubated   Neck: supple, no JVD  Chest:  clear to auscultation bilaterally without respiratory distress  CVS: regular rate and rhythm  Abdominal: soft, nontender, positive bowel sounds  Extremities: lower extremity 2+ edema  Skin: warm and dry without rash  Neuro: no focal motor deficits    Labs:  Results from last 7 days   Lab Units 03/12/19  0335 03/11/19  0603 03/10/19  0555   WBC 10*3/mm3 5.94 6.14 11.35*   HEMOGLOBIN g/dL 9.4* 8.1* 11.4*   HEMATOCRIT % 29.2* 25.1* 35.3*   PLATELETS 10*3/mm3 225 161 234         Results from last 7 days   Lab Units 03/12/19  0335 03/11/19  0340 03/10/19  1505 03/10/19  0555   SODIUM mmol/L 140 138 139 140   POTASSIUM mmol/L 5.9* 5.8* 5.1 5.8*   CHLORIDE mmol/L 110 109 109 108   CO2 mmol/L 21.0* 21.0* 23.0* 23.0*   BUN mg/dL  66* 67* 66* 66*   CREATININE mg/dL 4.51* 4.41* 4.37* 4.19*   CALCIUM mg/dL 8.5 8.5 8.4 8.9   BILIRUBIN mg/dL 0.5 0.3  --  0.5   ALK PHOS U/L 84 69  --  94   ALT (SGPT) U/L 28 32  --  34   AST (SGOT) U/L 18 20  --  49*   GLUCOSE mg/dL 212* 188* 151* 182*       Radiology:   Imaging Results (last 72 hours)     Procedure Component Value Units Date/Time    XR Chest 1 View [[id number]] Collected:  03/12/19 0731     Updated:  03/12/19 0736    Narrative:       EXAMINATION: XR CHEST 1 VW-     3/12/2019 3:40 AM CDT     HISTORY: Intubated Patient; J96.01-Acute respiratory failure with  hypoxia; J96.02-Acute respiratory failure with hypercapnia;  N18.9-Chronic kidney disease, unspecified; E87.5-Hyperkalemia;  E87.70-Fluid overload, unspecified; D64.9-Anemia, unspecified.     One view chest x-ray compared with yesterday.     Endotracheal tube remains in good position. The tip is approximately 5  cm above the gabrielle.  A nasogastric tube has been placed and is in good position.     Heart size is unchanged.     Bibasilar consolidation is the same or slightly worse. No improvement.     No pneumothorax.     Summary:  1. No significant change.  This report was finalized on 03/12/2019 07:33 by Dr. Papito Oglesby MD.    US Renal Bilateral [[id number]] Collected:  03/11/19 1515     Updated:  03/11/19 1519    Narrative:       EXAMINATION: US RENAL BILATERAL-     3/11/2019 1:40 PM CDT     HISTORY: CAMILO; J96.01-Acute respiratory failure with hypoxia;  J96.02-Acute respiratory failure with hypercapnia; N18.9-Chronic kidney  disease, unspecified; E87.5-Hyperkalemia; E87.70-Fluid overload,  unspecified; D64.9-Anemia, unspecified.     Detail is limited. There is no hydronephrosis.  Cortical thickness and cortical echogenicity is appropriate.     Right kidney = 1 39 x 51 x 68 mm.  Left kidney = 1 38 x 62 x 63 mm.     Pleural fluid is incidentally noted.     Summary:  1. Symmetric kidneys with no obstruction.  This report was finalized on 03/11/2019  15:16 by Dr. Papito Oglesby MD.    XR Abdomen KUB [[id number]] Collected:  03/11/19 1101     Updated:  03/11/19 1105    Narrative:       EXAMINATION: XR ABDOMEN KUB-     3/11/2019 10:17 AM CDT     HISTORY: ngt placement; J96.01-Acute respiratory failure with hypoxia;  J96.02-Acute respiratory failure with hypercapnia; N18.9-Chronic kidney  disease, unspecified; E87.5-Hyperkalemia; E87.70-Fluid overload,  unspecified; D64.9-Anemia, unspecified.     Portable radiograph of the upper abdomen and lower chest shows a well  positioned nasogastric tube extending to or beyond the mid stomach.     Bibasilar infiltrate noted.  Normal bones and normal visualized bowel gas pattern.     Summary:  1. Well-positioned nasogastric tube.  This report was finalized on 03/11/2019 11:02 by Dr. Papito Oglesby MD.    XR Chest 1 View [[id number]] Collected:  03/11/19 0657     Updated:  03/11/19 0701    Narrative:       EXAMINATION:   XR CHEST 1 VW-  3/11/2019 6:57 AM CDT     HISTORY: Patient intubated     Frontal upright radiograph of the chest 3/11/2019 3:19 AM CDT     COMPARISON: March 10, 2019.     FINDINGS:   Again bilateral interstitial air space filling infiltrates are present.  Says appearance of pulmonary edema. This is not significantly changed  compared to prior study March 10. Increased density in the right lung  base is noted in the left lung base. This may be posterior pleural  effusions. The cardiac silhouette is normal. Infiltrate tube is  unchanged in position..      The osseous structures and surrounding soft tissues demonstrate no acute  abnormality.       Impression:       1. Persistent bilateral interstitial air space filling infiltrates. Says  appearance pulmonary edema. Bilateral pleural effusions are noted. There  is no significant improvement from March 10.        This report was finalized on 03/11/2019 06:58 by Dr. Natalio Uriostegui MD.    XR Chest 1 View [[id number]] Collected:  03/10/19 0952     Updated:  03/10/19 0956     Narrative:       HISTORY: Dyspnea     CXR: A frontal view the chest is obtained.     COMPARISON: 12/2/2012     FINDINGS: Endotracheal tube is appropriately positioned with the tip at  the T4 level. There are diffuse bilateral pulmonary opacities. Mixed  interstitial alveolar. There are small pleural effusions. Cardiac  silhouette is obscured by the pulmonary opacities. There is no  pneumothorax. There is no acute bony pathology.       Impression:       1. Diffuse mixed interstitial alveolar opacities with small pleural  effusions. Findings may be due to edema and/or pneumonia. Appropriate  positioning of the endotracheal tube.  This report was finalized on 03/10/2019 09:53 by Dr. Teri Schwartz MD.    CT Chest Without Contrast [[id number]] Collected:  03/10/19 0758     Updated:  03/10/19 0805    Narrative:       CT CHEST WO CONTRAST- 3/10/2019 7:35 AM CDT     HISTORY: respiratory distress, eval for pna vs edema      COMPARISON: None     DOSE LENGTH PRODUCT: 477 mGy cm. Automated exposure control was also  utilized to decrease patient radiation dose.     TECHNIQUE: Axial images the chest are obtained without IV contrast     FINDINGS:  The endotracheal tube is appropriate in position with the tip  above the gabrielle. Reactive mediastinal lymph nodes measure up to 10 mm.  The thoracic aorta appears normal in caliber. The heart is borderline  prominent in size. There are small pericardial effusion is identified.  There is a small to moderate right and a small left pleural effusion.     Limited images the upper abdomen demonstrate no adrenal nodules.     There is smooth interlobular septal thickening within the aerated upper  lobes. There are scattered patchy groundglass opacities seen throughout  the lung parenchyma with dense consolidation of the lower lobes. No  discrete endobronchial lesions are identified. There is no pneumothorax.     No focal destructive bony lesions are visualized.       Impression:       1.  Bilateral diffuse patchy opacities are suggestive for multifocal  pneumonia. There is also interlobular septal thickening suggesting  underlying edema. There is a small to moderate right and small left  pleural effusion with a very small pericardial effusion. Heart is  borderline enlarged. Thoracic aorta normal in caliber.  2. Appropriate positioning of the endotracheal tube.  This report was finalized on 03/10/2019 08:02 by Dr. Teri Schwartz MD.          Culture:  Blood Culture   Date Value Ref Range Status   03/10/2019 No growth at 2 days  Preliminary   03/10/2019 No growth at 2 days  Preliminary     Respiratory Culture   Date Value Ref Range Status   03/10/2019 Light growth (2+) Normal Respiratory [place]  Preliminary         Assessment   1.  Acute kidney injury; likely ATN--renal function declining  2.  Baseline chronic kidney disease stage 4  3.  Hyperkalemia  4.  Acute hypoxic respiratory failure  5.  Metabolic acidosis  6.  Anemia of chronic kidney disease (s/p 2 units PRBC last week at [facility] [place])  7.  Type 1 diabetes with nephropathy  8.  Essential hypertension  9.  Clinically volume overloaded    Plan:  1.  Repeat Bumex IV now  2.  Renal function still declining, remains hyperkalemic; likely will require dialysis very soon.     Aleksandar Conteh, APRN  3/12/2019  8:57 AM

## 2019-03-12 NOTE — PROGRESS NOTES
[facility] Medicine Services  INPATIENT PROGRESS NOTE    Patient Name: Jose Shah  Date of Admission: 3/10/2019  Today's Date: 03/12/19  Length of Stay: 2  Primary Care Physician: Dai Boston APRN    Subjective   Chief Complaint: respiratory failure  HPI   No events overnight.  He did receive Kayexalate from nephrology, but has not had a bowel movement.  He continues to be hyperkalemic with elevated creatinine.  He does remain nonoliguric.  He is highly likely to require hemodialysis today.    He was given diuretics yesterday as well.     Review of Systems   Unable to assess secondary to the patient's intubated and sedated state.     Objective    Temp:  [97.3 °F (36.3 °C)-98 °F (36.7 °C)] 98 °F (36.7 °C)  Heart Rate:  [] 100  Resp:  [16-18] 16  BP: (108-150)/(74-98) 123/74  FiO2 (%):  [35 %] 35 %  Physical Exam  Constitutional: He appears well-developed and well-nourished.   No distress on ventilatory support.  No family present.  Seen and discussed with his nurse, Sinai.   Head: Normocephalic and atraumatic.   Eyes: Conjunctivae are normal. Pupils are equal, round, and reactive to light.   Neck: Neck supple. No JVD present.   Cardiovascular: Normal rate, regular rhythm, normal heart sounds and intact distal pulses. Exam reveals no gallop and no friction rub. No murmur heard.  Pulmonary/Chest: He has rales. Ventilated, diminished at the bases with rales.   Abdominal: Soft. Bowel sounds are normal. He exhibits no distension. There is no tenderness. There is no rebound and no guarding.   Musculoskeletal: Normal range of motion. He exhibits edema. He exhibits no tenderness or deformity.   Neurological: He displays normal reflexes. He exhibits normal muscle tone.   Sedate with propofol.  Withdraws to painful stimuli equally in all extremities.   Skin: Skin is warm and dry. No rash noted. He has an excoriated ulcer on the left foot on the dorsum involving the fourth toe  predominantly.  This does not appear to be infected.     Intake/Output Summary (Last 24 hours) at 3/12/2019 0831  Last data filed at 3/12/2019 0400  Gross per 24 hour   Intake 1846 ml   Output 3675 ml   Net -1829 ml     Results Review:  I have reviewed the labs, radiology results, and diagnostic studies.    Laboratory Data:   Results from last 7 days   Lab Units 03/12/19  0335 03/11/19  0603 03/10/19  0555   WBC 10*3/mm3 5.94 6.14 11.35*   HEMOGLOBIN g/dL 9.4* 8.1* 11.4*   HEMATOCRIT % 29.2* 25.1* 35.3*   PLATELETS 10*3/mm3 225 161 234     Results from last 7 days   Lab Units 03/12/19  0335 03/11/19  0340 03/10/19  1505 03/10/19  0555   SODIUM mmol/L 140 138 139 140   POTASSIUM mmol/L 5.9* 5.8* 5.1 5.8*   CHLORIDE mmol/L 110 109 109 108   CO2 mmol/L 21.0* 21.0* 23.0* 23.0*   BUN mg/dL 66* 67* 66* 66*   CREATININE mg/dL 4.51* 4.41* 4.37* 4.19*   CALCIUM mg/dL 8.5 8.5 8.4 8.9   BILIRUBIN mg/dL 0.5 0.3  --  0.5   ALK PHOS U/L 84 69  --  94   ALT (SGPT) U/L 28 32  --  34   AST (SGOT) U/L 18 20  --  49*   GLUCOSE mg/dL 212* 188* 151* 182*     Culture Data:   Blood Culture   Date Value Ref Range Status   03/10/2019 No growth at 2 days  Preliminary   03/10/2019 No growth at 2 days  Preliminary     Respiratory Culture   Date Value Ref Range Status   03/10/2019 Light growth (2+) Normal Respiratory Althea  Preliminary     Radiology Data:   Imaging Results (last 24 hours)     Procedure Component Value Units Date/Time    XR Chest 1 View [[id number]] Collected:  03/12/19 0731     Updated:  03/12/19 0736    Narrative:       EXAMINATION: XR CHEST 1 VW-     3/12/2019 3:40 AM CDT     HISTORY: Intubated Patient; J96.01-Acute respiratory failure with  hypoxia; J96.02-Acute respiratory failure with hypercapnia;  N18.9-Chronic kidney disease, unspecified; E87.5-Hyperkalemia;  E87.70-Fluid overload, unspecified; D64.9-Anemia, unspecified.     One view chest x-ray compared with yesterday.     Endotracheal tube remains in good position. The  tip is approximately 5  cm above the gabrielle.  A nasogastric tube has been placed and is in good position.     Heart size is unchanged.     Bibasilar consolidation is the same or slightly worse. No improvement.     No pneumothorax.     Summary:  1. No significant change.  This report was finalized on 03/12/2019 07:33 by Dr. Papito Oglesby MD.    US Renal Bilateral [[id number]] Collected:  03/11/19 1515     Updated:  03/11/19 1519    Narrative:       EXAMINATION: US RENAL BILATERAL-     3/11/2019 1:40 PM CDT     HISTORY: CAMILO; J96.01-Acute respiratory failure with hypoxia;  J96.02-Acute respiratory failure with hypercapnia; N18.9-Chronic kidney  disease, unspecified; E87.5-Hyperkalemia; E87.70-Fluid overload,  unspecified; D64.9-Anemia, unspecified.     Detail is limited. There is no hydronephrosis.  Cortical thickness and cortical echogenicity is appropriate.     Right kidney = 1 39 x 51 x 68 mm.  Left kidney = 1 38 x 62 x 63 mm.     Pleural fluid is incidentally noted.     Summary:  1. Symmetric kidneys with no obstruction.  This report was finalized on 03/11/2019 15:16 by Dr. Papito Oglesby MD.    XR Abdomen KUB [[id number]] Collected:  03/11/19 1101     Updated:  03/11/19 1105    Narrative:       EXAMINATION: XR ABDOMEN KUB-     3/11/2019 10:17 AM CDT     HISTORY: ngt placement; J96.01-Acute respiratory failure with hypoxia;  J96.02-Acute respiratory failure with hypercapnia; N18.9-Chronic kidney  disease, unspecified; E87.5-Hyperkalemia; E87.70-Fluid overload,  unspecified; D64.9-Anemia, unspecified.     Portable radiograph of the upper abdomen and lower chest shows a well  positioned nasogastric tube extending to or beyond the mid stomach.     Bibasilar infiltrate noted.  Normal bones and normal visualized bowel gas pattern.     Summary:  1. Well-positioned nasogastric tube.  This report was finalized on 03/11/2019 11:02 by Dr. Papito Oglesby MD.        Results from last 7 days   Lab Units 03/12/19  0236   PH,  ARTERIAL pH units 7.407   PO2 ART mm Hg 64.8*   PCO2, ARTERIAL mm Hg 34.3*   HCO3 ART mmol/L 21.6   FiO2 (%):  [35 %] 35 %  S RR:  [16] 16  PEEP/CPAP (cm H2O):  [6 cm H20] 6 cm H20  MD SUP:  [0 cm H20] 0 cm H20  MAP (cm H2O):  [11-14] 12    Results for orders placed during the hospital encounter of 03/10/19   Transthoracic Echo Complete With Contrast if Necessary Per Protocol    Narrative · Left ventricular systolic function is mildly decreased (LVEF 46-50% with   mild global hypokinesis).  · Left ventricular wall thickness is consistent with mild concentric   hypertrophy.  · Left atrial cavity size is severely dilated.  · Estimated right ventricular systolic pressure from tricuspid   regurgitation is moderately elevated (45-55 mmHg).  · There is a small (<1cm) pericardial effusion without echocardiographic   features of tamponade physiology.  · Pleural effusion noted.  · No significant valvular pathology.        I have reviewed the patient's current medications.     Assessment/Plan     Active Hospital Problems    Diagnosis   • **Acute respiratory failure with hypoxia and hypercapnia (CMS/HCC)   • History of recent pneumonia   • Acute congestive heart failure (CMS/HCC)   • Diabetic ulcer of toe of left foot associated with type 1 diabetes mellitus, limited to breakdown of skin (CMS/HCC)   • Acute kidney injury (CMS/HCC)   • Hyperkalemia   • Anemia   • Type 1 diabetes, uncontrolled, with gastroparesis (CMS/HCC)   • Diabetic polyneuropathy associated with type 1 diabetes mellitus (CMS/HCC)   • Tobacco abuse disorder     Plan:  He was originally admitted on 3/10 by Dr. Castillo.  He presented with acute hypoxic and hypercarbic respiratory failure.  History was given that he had recently been discharged from [facility] in [place], [place]. He was treated there for pneumonia per report.  He required intubation.  He remains mechanically ventilated.     Pulmonary has been consulted to assist with his  mechanical ventilation.  Continue inhaled bronchodilators.  He was being covered broadly with vancomycin, doxycycline, and meropenem.  I stopped doxycycline on 3/11.  He received 1 dose of levofloxacin in the ER on 3/10.  Urinary antigens are negative and sputum/blood cultures show no growth thus far.     Nephrology has been consulted.  He was found to be in acute renal failure with hyperkalemia.  These conditions are still present and have worsened today.  He is likely to require hemodialysis today. Will discuss with Dr. Snyder.      Echocardiogram was obtained and shows an ejection fraction of 46-50%.  He will eventually need to have this more formally addressed.  He also has some hypokinetic segments on echo, which would raise concern for possible underlying coronary disease even at a young age due to his underlying uncontrolled type 1 diabetes.    Monitor hemoglobin; improved.  No signs of bleeding.  Anemia substrates that were checked in February 2019 were normal.     He has a chronic ulcer of the left fourth toe that does not appear to be infected at this point in time.  He does see wound care as an outpatient for this.  We will need to address this more formally at some point as well.    He is a long-standing type I diabetic.  His current hemoglobin A1c is 5.3.  Continue Accu-Cheks and sliding scale insulin for now.     Lovenox for DVT prophylaxis.  Pepcid for peptic ulcer prophylaxis while on ventilatory support.    Darius Sharma,    03/12/19   8:30 AM

## 2019-03-12 NOTE — PROGRESS NOTES
Nutrition Services    Patient Name:  Jose Shah  YOB: 1985  MRN: [id number]  Admit Date:  3/10/2019    Pt sedated on mechanical vent. NPO day 3. If unable to wean from ventilator within a few days may benefit from AMONS. Please advise.  Thank you    Electronically signed by:  Alexandra Greene MS,RDN,LD  03/12/19 1:24 PM

## 2019-03-12 NOTE — PROGRESS NOTES
PULMONARY AND CRITICAL CARE PROGRESS NOTE - [facility]    Patient: Jose Shah    1985    MR# [id number]    Acct# [id number]  03/12/19   9:18 AM  Referring Provider: Darius Sharma DO    Chief Complaint: Mechanically ventilated    Interval history: Patient remains intubated and sedated.  He is now on a bicarbonate drip. Chest x-ray and ABG remained stable.  No family at the bedside.  Renal failure and hyperkalemia continue to be an issue.    Meds:    bumetanide 1 mg Intravenous Once   chlorhexidine 15 mL Mouth/Throat Q12H   enoxaparin 30 mg Subcutaneous Q24H   famotidine 20 mg Intravenous Daily   insulin lispro 2-9 Units Subcutaneous Q6H   ipratropium-albuterol 3 mL Nebulization 4x Daily - RT   meropenem 1 g Intravenous Q12H   sodium chloride 3 mL Intravenous Q12H   vancomycin 1,000 mg Intravenous Q24H       Pharmacy to Dose meropenem (MERREM)     propofol 5-50 mcg/kg/min Last Rate: 50 mcg/kg/min (03/12/19 0872)   custom IV infusion builder  Last Rate: 50 mL/hr at 03/11/19 1442     Review of Systems:     Cannot obtain due to mechanical ventilation.  The patient notably is critically ill and connected to a ventilator.  As such patient cannot communicate and provide any history whatsoever, including any history of present illness or interval history since arrival or review of systems. The interested reviewer may note this fact, as an attempt has been made at collecting and documenting these portions of the patient history, but this information is unobtainable despite attempted review and therefore cannot be documented at this time.     Ventilator Settings:     Vt (Set, L): 0.55 L  Resp Rate (Set): 16  Pressure Support (cm H2O): 0 cm H20  FiO2 (%): 35 %  PEEP/CPAP (cm H2O): 6 cm H20  Minute Ventilation (L/min) (Obs): 9.03 L/min  Resp Rate (Observed) Vent: 16  I:E Ratio (Set): 1:2.80  I:E Ratio (Obs): 1:2.8  PIP Observed (cm H2O): 26 cm H2O     Physical Exam:  Temp:  [97.3 °F (36.3 °C)-98 °F  (36.7 °C)] 98 °F (36.7 °C)  Heart Rate:  [] 100  Resp:  [16-18] 16  BP: (113-150)/(74-98) 123/74  FiO2 (%):  [35 %] 35 %    Intake/Output Summary (Last 24 hours) at 3/12/2019 0918  Last data filed at 3/12/2019 0400  Gross per 24 hour   Intake 1527 ml   Output 3675 ml   Net -2148 ml     SpO2 Percentage    03/12/19 0702 03/12/19 0710 03/12/19 0804   SpO2: 94% 96% 94%      Physical Exam:    Constitutional: He appears well-developed and well-nourished. He is sleeping.  Non-toxic appearance. He does not have a sickly appearance. He does not appear ill. No distress. He is sedated and intubated.   HENT:   Nose: Nose normal.   Eyes: No scleral icterus.   Neck: Neck supple. No JVD present.   Cardiovascular: Normal rate and regular rhythm.   No murmur heard.  Pulmonary/Chest: No accessory muscle usage. He is intubated. No respiratory distress. He has decreased breath sounds. He has no wheezes. He has no rhonchi.   Abdominal: Soft. He exhibits no distension. There is no tenderness. There is no guarding.   Musculoskeletal: He exhibits edema. He exhibits no deformity.   Lymphadenopathy:     He has no cervical adenopathy.   Neurological: He is unresponsive.   Skin: Skin is dry. No rash noted. He is not diaphoretic.   Psychiatric: His mood appears not anxious. He is not agitated        Results from last 7 days   Lab Units 03/12/19  0335 03/11/19  0603 03/10/19  0555   WBC 10*3/mm3 5.94 6.14 11.35*   HEMOGLOBIN g/dL 9.4* 8.1* 11.4*   PLATELETS 10*3/mm3 225 161 234     Results from last 7 days   Lab Units 03/12/19  0335 03/11/19  0340 03/10/19  1505   SODIUM mmol/L 140 138 139   POTASSIUM mmol/L 5.9* 5.8* 5.1   BUN mg/dL 66* 67* 66*   CREATININE mg/dL 4.51* 4.41* 4.37*     Results from last 7 days   Lab Units 03/12/19  0236 03/11/19  0505 03/10/19  1510   PH, ARTERIAL pH units 7.407 7.335* 7.355   PCO2, ARTERIAL mm Hg 34.3* 41.1 40.4   PO2 ART mm Hg 64.8* 80.9* 231.0*   FIO2 % 35 50 80     Blood Culture   Date Value Ref Range  Status   03/10/2019 No growth at 24 hours  Preliminary   03/10/2019 No growth at 24 hours  Preliminary     Recent films:  Xr Chest 1 View    Result Date: 3/11/2019  1. Persistent bilateral interstitial air space filling infiltrates. Says appearance pulmonary edema. Bilateral pleural effusions are noted. There is no significant improvement from March 10.   This report was finalized on 03/11/2019 06:58 by Dr. Natalio Uriostegui MD.    Films reviewed personally by me.  My interpretation: Endotracheal tube in position, bilateral pleural effusions persist with bilateral interstitial infiltrates    Pulmonary Assessment:    1. Acute respiratory failure requiring mechanical ventilation secondary to bilateral lung infiltrates  2. Poorly controlled diabetes  3. Acute on chronic renal failure  4. Suspected congestive heart failure or fluid overload related to acute on chronic renal failure, with elevated BNP  5. Bilateral pleural effusions likely related to #1  6. Possible incompletely controlled pneumonia or new pneumonia or recent pneumonia with resolution but persistent opacities which have not had time to clear  7. History of vitamin D deficiency  8. Hyperkalemia likely related to renal failure  9. Metabolic acidosis    Recommend:     · No adjustments made to the ventilator.  Not ready for weaning trials  · Ad acetone level   · Stress ulcer and DVT prophylaxis  · Continue broad-spectrum antibiotic coverage  · We will hold off on additional quinolone or macrolides given his prolonged QT interval  · Respiratory culture pending  · Diabetic management per attending  · Prognosis guarded    Electronically signed by MASOUD Stoner on 3/12/2019 at 9:18 AM    Physician substantive portion:  Patient remains sedated on the ventilator.  He remains in renal failure.  His breath sounds are diminished.  He is markedly acidotic on bicarbonate drip.  He does not have ventilator dyssynchrony.  We will not change his support today.  He  is not ready for spontaneous breathing trials or extubation.  Prognosis uncertain.    I have seen and examined patient personally, performing a face-to-face diagnostic evaluation with plan of care reviewed and developed with APRN and nursing staff. I have addended and/or modified the above history of present illness, physical examination, and assessment and plan to reflect my findings and impressions. Essential elements of the care plan were discussed with APRN above.  Agree with findings and assessment/plan as documented above.    Electronically signed by Sascha Chiang MD, on 3/12/2019, 8:18 PM

## 2019-03-13 ENCOUNTER — ANESTHESIA EVENT (OUTPATIENT)
Dept: PERIOP | Facility: HOSPITAL | Age: 34
End: 2019-03-13

## 2019-03-13 ENCOUNTER — APPOINTMENT (OUTPATIENT)
Dept: GENERAL RADIOLOGY | Facility: HOSPITAL | Age: 34
End: 2019-03-13

## 2019-03-13 ENCOUNTER — APPOINTMENT (OUTPATIENT)
Dept: INTERVENTIONAL RADIOLOGY/VASCULAR | Facility: HOSPITAL | Age: 34
End: 2019-03-13

## 2019-03-13 ENCOUNTER — ANESTHESIA (OUTPATIENT)
Dept: PERIOP | Facility: HOSPITAL | Age: 34
End: 2019-03-13

## 2019-03-13 LAB
ALBUMIN SERPL-MCNC: 2.5 G/DL (ref 3.5–5)
ALBUMIN/GLOB SERPL: 1 G/DL (ref 1.1–2.5)
ALP SERPL-CCNC: 82 U/L (ref 24–120)
ALT SERPL W P-5'-P-CCNC: 27 U/L (ref 0–54)
ANION GAP SERPL CALCULATED.3IONS-SCNC: 11 MMOL/L (ref 4–13)
ARTERIAL PATENCY WRIST A: POSITIVE
AST SERPL-CCNC: 20 U/L (ref 7–45)
ATMOSPHERIC PRESS: 751 MMHG
BASE EXCESS BLDA CALC-SCNC: -0.9 MMOL/L (ref 0–2)
BASOPHILS # BLD AUTO: 0.05 10*3/MM3 (ref 0–0.2)
BASOPHILS NFR BLD AUTO: 0.8 % (ref 0–2)
BDY SITE: ABNORMAL
BILIRUB SERPL-MCNC: 0.4 MG/DL (ref 0.1–1)
BODY TEMPERATURE: 37 C
BUN BLD-MCNC: 64 MG/DL (ref 5–21)
BUN/CREAT SERPL: 14.3 (ref 7–25)
CALCIUM SPEC-SCNC: 8.5 MG/DL (ref 8.4–10.4)
CHLORIDE SERPL-SCNC: 112 MMOL/L (ref 98–110)
CO2 SERPL-SCNC: 22 MMOL/L (ref 24–31)
CREAT BLD-MCNC: 4.46 MG/DL (ref 0.5–1.4)
DEPRECATED RDW RBC AUTO: 56 FL (ref 40–54)
EOSINOPHIL # BLD AUTO: 0.27 10*3/MM3 (ref 0–0.7)
EOSINOPHIL NFR BLD AUTO: 4.1 % (ref 0–4)
ERYTHROCYTE [DISTWIDTH] IN BLOOD BY AUTOMATED COUNT: 16.1 % (ref 12–15)
GFR SERPL CREATININE-BSD FRML MDRD: 15 ML/MIN/1.73
GLOBULIN UR ELPH-MCNC: 2.4 GM/DL
GLUCOSE BLD-MCNC: 138 MG/DL (ref 70–100)
GLUCOSE BLDC GLUCOMTR-MCNC: 110 MG/DL (ref 70–130)
GLUCOSE BLDC GLUCOMTR-MCNC: 143 MG/DL (ref 70–130)
GLUCOSE BLDC GLUCOMTR-MCNC: 223 MG/DL (ref 70–130)
GLUCOSE BLDC GLUCOMTR-MCNC: 226 MG/DL (ref 70–130)
HAV IGM SERPL QL IA: NEGATIVE
HBV CORE IGM SERPL QL IA: NEGATIVE
HBV SURFACE AB SER QL: 14.8
HBV SURFACE AB SER RIA-ACNC: NORMAL
HBV SURFACE AG SERPL QL IA: NEGATIVE
HCO3 BLDA-SCNC: 23.3 MMOL/L (ref 20–26)
HCT VFR BLD AUTO: 29.6 % (ref 40–52)
HCV AB SER DONR QL: NEGATIVE
HCV S/C RATIO: 0.01 (ref 0–0.99)
HGB BLD-MCNC: 9.5 G/DL (ref 14–18)
HOROWITZ INDEX BLD+IHG-RTO: 35 %
IMM GRANULOCYTES # BLD AUTO: 0.02 10*3/MM3 (ref 0–0.05)
IMM GRANULOCYTES NFR BLD AUTO: 0.3 % (ref 0–5)
LYMPHOCYTES # BLD AUTO: 1.73 10*3/MM3 (ref 0.72–4.86)
LYMPHOCYTES NFR BLD AUTO: 26.1 % (ref 15–45)
Lab: ABNORMAL
MAGNESIUM SERPL-MCNC: 2.2 MG/DL (ref 1.4–2.2)
MCH RBC QN AUTO: 30.4 PG (ref 28–32)
MCHC RBC AUTO-ENTMCNC: 32.1 G/DL (ref 33–36)
MCV RBC AUTO: 94.6 FL (ref 82–95)
MODALITY: ABNORMAL
MONOCYTES # BLD AUTO: 0.55 10*3/MM3 (ref 0.19–1.3)
MONOCYTES NFR BLD AUTO: 8.3 % (ref 4–12)
NEUTROPHILS # BLD AUTO: 4.01 10*3/MM3 (ref 1.87–8.4)
NEUTROPHILS NFR BLD AUTO: 60.4 % (ref 39–78)
NRBC BLD AUTO-RTO: 0 /100 WBC (ref 0–0)
PCO2 BLDA: 35.8 MM HG (ref 35–45)
PEEP RESPIRATORY: 6 CM[H2O]
PH BLDA: 7.42 PH UNITS (ref 7.35–7.45)
PHOSPHATE SERPL-MCNC: 6.7 MG/DL (ref 2.5–4.5)
PLATELET # BLD AUTO: 254 10*3/MM3 (ref 130–400)
PMV BLD AUTO: 9.3 FL (ref 6–12)
PO2 BLDA: 76.2 MM HG (ref 83–108)
POTASSIUM BLD-SCNC: 5.1 MMOL/L (ref 3.5–5.3)
PROT SERPL-MCNC: 4.9 G/DL (ref 6.3–8.7)
RBC # BLD AUTO: 3.13 10*6/MM3 (ref 4.8–5.9)
SAO2 % BLDCOA: 96.1 % (ref 94–99)
SET MECH RESP RATE: 16
SODIUM BLD-SCNC: 145 MMOL/L (ref 135–145)
VENTILATOR MODE: AC
VT ON VENT VENT: 550 ML
WBC NRBC COR # BLD: 6.63 10*3/MM3 (ref 4.8–10.8)

## 2019-03-13 PROCEDURE — 05HM33Z INSERTION OF INFUSION DEVICE INTO RIGHT INTERNAL JUGULAR VEIN, PERCUTANEOUS APPROACH: ICD-10-PCS | Performed by: SURGERY

## 2019-03-13 PROCEDURE — 25010000002 FENTANYL CITRATE (PF) 100 MCG/2ML SOLUTION: Performed by: NURSE ANESTHETIST, CERTIFIED REGISTERED

## 2019-03-13 PROCEDURE — 85025 COMPLETE CBC W/AUTO DIFF WBC: CPT | Performed by: INTERNAL MEDICINE

## 2019-03-13 PROCEDURE — 0JH63XZ INSERTION OF TUNNELED VASCULAR ACCESS DEVICE INTO CHEST SUBCUTANEOUS TISSUE AND FASCIA, PERCUTANEOUS APPROACH: ICD-10-PCS | Performed by: SURGERY

## 2019-03-13 PROCEDURE — 94799 UNLISTED PULMONARY SVC/PX: CPT

## 2019-03-13 PROCEDURE — 36558 INSERT TUNNELED CV CATH: CPT

## 2019-03-13 PROCEDURE — 71045 X-RAY EXAM CHEST 1 VIEW: CPT

## 2019-03-13 PROCEDURE — C1750 CATH, HEMODIALYSIS,LONG-TERM: HCPCS | Performed by: SURGERY

## 2019-03-13 PROCEDURE — 94770: CPT

## 2019-03-13 PROCEDURE — 25010000002 ALBUMIN HUMAN 25% PER 50 ML: Performed by: INTERNAL MEDICINE

## 2019-03-13 PROCEDURE — 25010000003 CEFAZOLIN PER 500 MG: Performed by: NURSE ANESTHETIST, CERTIFIED REGISTERED

## 2019-03-13 PROCEDURE — 25010000002 PROPOFOL 1000 MG/ML EMULSION: Performed by: EMERGENCY MEDICINE

## 2019-03-13 PROCEDURE — 83735 ASSAY OF MAGNESIUM: CPT | Performed by: INTERNAL MEDICINE

## 2019-03-13 PROCEDURE — 5A1D70Z PERFORMANCE OF URINARY FILTRATION, INTERMITTENT, LESS THAN 6 HOURS PER DAY: ICD-10-PCS | Performed by: NURSE PRACTITIONER

## 2019-03-13 PROCEDURE — 80074 ACUTE HEPATITIS PANEL: CPT | Performed by: INTERNAL MEDICINE

## 2019-03-13 PROCEDURE — C1894 INTRO/SHEATH, NON-LASER: HCPCS | Performed by: SURGERY

## 2019-03-13 PROCEDURE — 36558 INSERT TUNNELED CV CATH: CPT | Performed by: SURGERY

## 2019-03-13 PROCEDURE — 25010000002 HEPARIN (PORCINE) PER 1000 UNITS: Performed by: SURGERY

## 2019-03-13 PROCEDURE — 76937 US GUIDE VASCULAR ACCESS: CPT | Performed by: SURGERY

## 2019-03-13 PROCEDURE — 25010000002 IRON SUCROSE PER 1 MG: Performed by: INTERNAL MEDICINE

## 2019-03-13 PROCEDURE — 82962 GLUCOSE BLOOD TEST: CPT

## 2019-03-13 PROCEDURE — 86706 HEP B SURFACE ANTIBODY: CPT | Performed by: INTERNAL MEDICINE

## 2019-03-13 PROCEDURE — 76000 FLUOROSCOPY <1 HR PHYS/QHP: CPT

## 2019-03-13 PROCEDURE — 82803 BLOOD GASES ANY COMBINATION: CPT

## 2019-03-13 PROCEDURE — P9047 ALBUMIN (HUMAN), 25%, 50ML: HCPCS | Performed by: INTERNAL MEDICINE

## 2019-03-13 PROCEDURE — 63710000001 INSULIN LISPRO (HUMAN) PER 5 UNITS: Performed by: INTERNAL MEDICINE

## 2019-03-13 PROCEDURE — 25010000002 MORPHINE PER 10 MG: Performed by: INTERNAL MEDICINE

## 2019-03-13 PROCEDURE — 25010000002 PROPOFOL 10 MG/ML EMULSION: Performed by: NURSE ANESTHETIST, CERTIFIED REGISTERED

## 2019-03-13 PROCEDURE — 25010000002 MEROPENEM PER 100 MG: Performed by: INTERNAL MEDICINE

## 2019-03-13 PROCEDURE — 99233 SBSQ HOSP IP/OBS HIGH 50: CPT | Performed by: INTERNAL MEDICINE

## 2019-03-13 PROCEDURE — 25010000002 LORAZEPAM PER 2 MG: Performed by: INTERNAL MEDICINE

## 2019-03-13 PROCEDURE — 94003 VENT MGMT INPAT SUBQ DAY: CPT

## 2019-03-13 PROCEDURE — 84100 ASSAY OF PHOSPHORUS: CPT | Performed by: INTERNAL MEDICINE

## 2019-03-13 PROCEDURE — 36600 WITHDRAWAL OF ARTERIAL BLOOD: CPT

## 2019-03-13 PROCEDURE — B513ZZA FLUOROSCOPY OF RIGHT JUGULAR VEINS, GUIDANCE: ICD-10-PCS | Performed by: SURGERY

## 2019-03-13 PROCEDURE — 25010000002 ENOXAPARIN PER 10 MG: Performed by: INTERNAL MEDICINE

## 2019-03-13 PROCEDURE — 80053 COMPREHEN METABOLIC PANEL: CPT | Performed by: INTERNAL MEDICINE

## 2019-03-13 RX ORDER — SODIUM CHLORIDE 9 MG/ML
INJECTION, SOLUTION INTRAVENOUS CONTINUOUS PRN
Status: DISCONTINUED | OUTPATIENT
Start: 2019-03-13 | End: 2019-03-13 | Stop reason: SURG

## 2019-03-13 RX ORDER — CEFAZOLIN SODIUM 1 G/3ML
INJECTION, POWDER, FOR SOLUTION INTRAMUSCULAR; INTRAVENOUS AS NEEDED
Status: DISCONTINUED | OUTPATIENT
Start: 2019-03-13 | End: 2019-03-13 | Stop reason: SURG

## 2019-03-13 RX ORDER — BUPIVACAINE HYDROCHLORIDE AND EPINEPHRINE 5; 5 MG/ML; UG/ML
INJECTION, SOLUTION EPIDURAL; INTRACAUDAL; PERINEURAL AS NEEDED
Status: DISCONTINUED | OUTPATIENT
Start: 2019-03-13 | End: 2019-03-13 | Stop reason: HOSPADM

## 2019-03-13 RX ORDER — PROPOFOL 10 MG/ML
VIAL (ML) INTRAVENOUS AS NEEDED
Status: DISCONTINUED | OUTPATIENT
Start: 2019-03-13 | End: 2019-03-13 | Stop reason: SURG

## 2019-03-13 RX ORDER — HEPARIN SODIUM 1000 [USP'U]/ML
3600 INJECTION, SOLUTION INTRAVENOUS; SUBCUTANEOUS ONCE
Status: DISCONTINUED | OUTPATIENT
Start: 2019-03-13 | End: 2019-03-19 | Stop reason: HOSPADM

## 2019-03-13 RX ORDER — NALOXONE HCL 0.4 MG/ML
0.4 VIAL (ML) INJECTION
Status: DISCONTINUED | OUTPATIENT
Start: 2019-03-13 | End: 2019-03-19 | Stop reason: HOSPADM

## 2019-03-13 RX ORDER — MORPHINE SULFATE 2 MG/ML
2 INJECTION, SOLUTION INTRAMUSCULAR; INTRAVENOUS EVERY 4 HOURS PRN
Status: DISCONTINUED | OUTPATIENT
Start: 2019-03-13 | End: 2019-03-19 | Stop reason: RX

## 2019-03-13 RX ORDER — FENTANYL CITRATE 50 UG/ML
INJECTION, SOLUTION INTRAMUSCULAR; INTRAVENOUS AS NEEDED
Status: DISCONTINUED | OUTPATIENT
Start: 2019-03-13 | End: 2019-03-13 | Stop reason: SURG

## 2019-03-13 RX ORDER — ALBUMIN (HUMAN) 12.5 G/50ML
12.5 SOLUTION INTRAVENOUS ONCE
Status: COMPLETED | OUTPATIENT
Start: 2019-03-13 | End: 2019-03-13

## 2019-03-13 RX ORDER — HEPARIN SODIUM 1000 [USP'U]/ML
INJECTION, SOLUTION INTRAVENOUS; SUBCUTANEOUS AS NEEDED
Status: DISCONTINUED | OUTPATIENT
Start: 2019-03-13 | End: 2019-03-13 | Stop reason: HOSPADM

## 2019-03-13 RX ADMIN — INSULIN LISPRO 4 UNITS: 100 INJECTION, SOLUTION INTRAVENOUS; SUBCUTANEOUS at 23:31

## 2019-03-13 RX ADMIN — PROPOFOL 50 MCG/KG/MIN: 10 INJECTION, EMULSION INTRAVENOUS at 07:17

## 2019-03-13 RX ADMIN — ALBUMIN HUMAN 12.5 G: 0.25 SOLUTION INTRAVENOUS at 16:38

## 2019-03-13 RX ADMIN — ENOXAPARIN SODIUM 30 MG: 30 INJECTION SUBCUTANEOUS at 09:04

## 2019-03-13 RX ADMIN — IPRATROPIUM BROMIDE AND ALBUTEROL SULFATE 3 ML: 2.5; .5 SOLUTION RESPIRATORY (INHALATION) at 06:43

## 2019-03-13 RX ADMIN — PROPOFOL 50 MCG/KG/MIN: 10 INJECTION, EMULSION INTRAVENOUS at 22:59

## 2019-03-13 RX ADMIN — IPRATROPIUM BROMIDE AND ALBUTEROL SULFATE 3 ML: 2.5; .5 SOLUTION RESPIRATORY (INHALATION) at 22:09

## 2019-03-13 RX ADMIN — CHLORHEXIDINE GLUCONATE 15 ML: 1.2 RINSE ORAL at 20:51

## 2019-03-13 RX ADMIN — SODIUM CHLORIDE, PRESERVATIVE FREE 3 ML: 5 INJECTION INTRAVENOUS at 20:51

## 2019-03-13 RX ADMIN — FAMOTIDINE 20 MG: 10 INJECTION INTRAVENOUS at 09:04

## 2019-03-13 RX ADMIN — PROPOFOL 50 MCG/KG/MIN: 10 INJECTION, EMULSION INTRAVENOUS at 18:49

## 2019-03-13 RX ADMIN — MEROPENEM 1 G: 1 INJECTION, POWDER, FOR SOLUTION INTRAVENOUS at 09:58

## 2019-03-13 RX ADMIN — INSULIN LISPRO 4 UNITS: 100 INJECTION, SOLUTION INTRAVENOUS; SUBCUTANEOUS at 11:38

## 2019-03-13 RX ADMIN — PROPOFOL 50 MCG/KG/MIN: 10 INJECTION, EMULSION INTRAVENOUS at 03:57

## 2019-03-13 RX ADMIN — IPRATROPIUM BROMIDE AND ALBUTEROL SULFATE 3 ML: 2.5; .5 SOLUTION RESPIRATORY (INHALATION) at 10:19

## 2019-03-13 RX ADMIN — FENTANYL CITRATE 100 MCG: 50 INJECTION, SOLUTION INTRAMUSCULAR; INTRAVENOUS at 14:06

## 2019-03-13 RX ADMIN — IRON SUCROSE 200 MG: 20 INJECTION, SOLUTION INTRAVENOUS at 15:59

## 2019-03-13 RX ADMIN — LORAZEPAM 1 MG: 2 INJECTION INTRAMUSCULAR; INTRAVENOUS at 11:38

## 2019-03-13 RX ADMIN — SODIUM CHLORIDE, PRESERVATIVE FREE 3 ML: 5 INJECTION INTRAVENOUS at 09:05

## 2019-03-13 RX ADMIN — CEFAZOLIN 1 G: 1 INJECTION, POWDER, FOR SOLUTION INTRAVENOUS at 14:07

## 2019-03-13 RX ADMIN — LORAZEPAM 1 MG: 2 INJECTION INTRAMUSCULAR; INTRAVENOUS at 19:21

## 2019-03-13 RX ADMIN — LORAZEPAM 1 MG: 2 INJECTION INTRAMUSCULAR; INTRAVENOUS at 05:14

## 2019-03-13 RX ADMIN — PROPOFOL 50 MCG/KG/MIN: 10 INJECTION, EMULSION INTRAVENOUS at 11:17

## 2019-03-13 RX ADMIN — PROPOFOL 50 MCG/KG/MIN: 10 INJECTION, EMULSION INTRAVENOUS at 15:03

## 2019-03-13 RX ADMIN — MORPHINE SULFATE 4 MG: 4 INJECTION, SOLUTION INTRAMUSCULAR; INTRAVENOUS at 20:55

## 2019-03-13 RX ADMIN — PROPOFOL 50 MCG/KG/MIN: 10 INJECTION, EMULSION INTRAVENOUS at 00:41

## 2019-03-13 RX ADMIN — SODIUM CHLORIDE: 9 INJECTION, SOLUTION INTRAVENOUS at 14:02

## 2019-03-13 RX ADMIN — PROPOFOL 50 MG: 10 INJECTION, EMULSION INTRAVENOUS at 14:06

## 2019-03-13 NOTE — ANESTHESIA PREPROCEDURE EVALUATION
Anesthesia Evaluation     Patient summary reviewed and Nursing notes reviewed   no history of anesthetic complications:  NPO Solid Status: > 8 hours  NPO Liquid Status: > 8 hours           Airway   Dental      Pulmonary    (+) a smoker Current,   Cardiovascular   Exercise tolerance: good (4-7 METS)    (+) hypertension, CHF,     ROS comment: Echo 3/10/19  · Left ventricular systolic function is mildly decreased (LVEF 46-50% with mild global hypokinesis).  · Left ventricular wall thickness is consistent with mild concentric hypertrophy.  · Left atrial cavity size is severely dilated.  · Estimated right ventricular systolic pressure from tricuspid regurgitation is moderately elevated (45-55 mmHg).  · There is a small (<1cm) pericardial effusion without echocardiographic features of tamponade physiology.  · Pleural effusion noted.  · No significant valvular pathology.    Neuro/Psych  (+) numbness (diabetic polyneuropathy),     GI/Hepatic/Renal/Endo    (+)  GERD,  renal disease ARF and CRI, diabetes mellitus type 1,     Musculoskeletal     Abdominal    Substance History      OB/GYN          Other                        Anesthesia Plan    ASA 3     general   (Pt is type 1 diabetic admitted with pneumonia, copd exacerbation. Intubated, coming for permacath for which he will receive dialysis today. Indication for dialysis is fluid overload. Lytes wnl. pao2 76 on 35% fio2. CT c/w multifocal pneumonia. Echo shows decreased EF, 45-50 with mild pericardial effusion, global hypokinesis. )  inhalational induction     Plan discussed with CRNA.

## 2019-03-13 NOTE — PROGRESS NOTES
Continued Stay Note   [facility]     Patient Name: Jsoe Shah  MRN: [id number]  Today's Date: 3/13/2019    Admit Date: 3/10/2019    Discharge Plan     Row Name 03/13/19 1039       Plan    Plan  Patient remains in CCU on vent, will be undergoing permacath placement today for dialysis.  SW has been unable to reach patient's family.  Needs unclear at this time, will follow.        Discharge Codes    No documentation.             NGA Sanchez

## 2019-03-13 NOTE — PROGRESS NOTES
Nephrology ([facility]) Progress Note      Patient:  Jose Shah  YOB: 1985  Date of Service: 3/13/2019  MRN: [id number]   Acct: [id number]   Primary Care Physician: Dai Boston APRN  Advance Directive:   Code Status and Medical Interventions:   Ordered at: 03/10/19 0743     Code Status:    CPR     Medical Interventions (Level of Support Prior to Arrest):    Full     Admit Date: 3/10/2019       Hospital Day: 3  Referring Provider: No ref. provider found      Patient personally seen and examined.  Complete chart including Consults, Notes, Operative Reports, Labs, Cardiology, and Radiology studies reviewed as able.        Subjective:  Jose Shah is a 33 y.o. male  whom we were consulted for acute kidney injury and hyperkalemia.  Baseline chronic kidney disease stage 4; follows with Dr Yan in [place], [place].  History of type I diabetes with noncompliance in the past, Lopez's esophagus, hypertension, COPD, tobacco abuse.  Recent piror admission at  in [place] for pneumonia. Discharged from their facility on 3/09; and presented to [facility] emergency department on 3/10.  Presented with increased dyspnea, altered mental status. Required intubation and mechanical ventilation shortly after arrival.  Hospital course remarkable for good response to IV diuretics but has persistently elevated potassium level. Renal function without any improvement.    Today is still intubated. No new overnight issues.  Urine output nonoliguric with IV Bumex.  Planning for permcath placement today.      Allergies:  Penicillins    Home Meds:  Medications Prior to Admission   Medication Sig Dispense Refill Last Dose   • CloNIDine (CATAPRES) 0.1 MG tablet Take 0.1 mg by mouth Every 8 (Eight) Hours.      • famotidine (PEPCID) 20 MG tablet Take 20 mg by mouth 2 (Two) Times a Day.      • hydrALAZINE (APRESOLINE) 25 MG tablet Take 25 mg by mouth Every 6 (Six) Hours  As Needed.      • metoprolol tartrate (LOPRESSOR) 50 MG tablet Take 50 mg by mouth 2 (Two) Times a Day.      • Alcohol Swabs (ALCOHOL WIPES) 70 % pads Use 4 x daily 120 each 11 Taking   • amLODIPine (NORVASC) 5 MG tablet Take 5 mg by mouth Daily.   2/28/2019 at Unknown time   • atorvastatin (LIPITOR) 80 MG tablet Take 80 mg by mouth Daily.   2/28/2019 at Unknown time   • Blood Glucose Monitoring Suppl w/Device kit USE AS INDICATED, ANY MONITOR 1 each 1 Taking   • Glucose Blood (BLOOD GLUCOSE TEST) strip Use 4 x daily, use any brand covered by insurance or same brand as before 120 each 11 Taking   • insulin aspart (NOVOLOG) 100 UNIT/ML injection USE AS DIRECTED UP  UNITS DAILY 5 each 5 3/1/2019 at Unknown time   • Lancet Devices (LANCING DEVICE) misc USE AS INDICATED TO CORRELATE WITH STRIPS AND METER 1 each 1 Taking   • Lancets 30G misc USE 4 X DAILY 120 each 11 Taking   • metoclopramide (REGLAN) 10 MG tablet Take 10 mg by mouth 3 (Three) Times a Day Before Meals.   2/28/2019 at Unknown time   • pantoprazole (PROTONIX) 40 MG EC tablet Take 40 mg by mouth 2 (Two) Times a Day.   2/28/2019 at Unknown time   • Urine Glucose-Ketones Test strip 1 each by In Vitro route As Needed (elevated glucose). 100 each 11 Taking       Medicines:  Current Facility-Administered Medications   Medication Dose Route Frequency Provider Last Rate Last Dose   • chlorhexidine (PERIDEX) 0.12 % solution 15 mL  15 mL Mouth/Throat Q12H Cisco Castillo MD   15 mL at 03/12/19 2036   • dextrose (D50W) 25 g/ 50mL Intravenous Solution 25 g  25 g Intravenous Q15 Min PRN Cisco Castillo MD       • dextrose (GLUTOSE) oral gel 15 g  15 g Oral Q15 Min PRN Cisco Castillo MD       • enoxaparin (LOVENOX) syringe 30 mg  30 mg Subcutaneous Q24H Cisco Castillo MD   30 mg at 03/12/19 0850   • famotidine (PEPCID) injection 20 mg  20 mg Intravenous Daily Cisco Castillo MD   20 mg at 03/12/19 0850   • glucagon (human  recombinant) (GLUCAGEN DIAGNOSTIC) injection 1 mg  1 mg Subcutaneous PRN Cisco Castillo MD       • insulin lispro (humaLOG) injection 2-9 Units  2-9 Units Subcutaneous Q6H Darius Sharma DO   4 Units at 03/12/19 2352   • ipratropium-albuterol (DUO-NEB) nebulizer solution 3 mL  3 mL Nebulization 4x Daily - RT Cisco Castillo MD   3 mL at 03/13/19 0643   • iron sucrose (VENOFER) 200 mg in sodium chloride 0.9 % 100 mL IVPB  200 mg Intravenous Q24H Marcelo Snyder MD   200 mg at 03/12/19 1419   • labetalol (NORMODYNE,TRANDATE) injection 10 mg  10 mg Intravenous Q4H PRN Cisco Castillo MD       • LORazepam (ATIVAN) injection 1 mg  1 mg Intravenous Q4H PRN Cisco Castillo MD   1 mg at 03/13/19 0514   • meropenem (MERREM) 1 g/100 mL 0.9% NS VTB (mbp)  1 g Intravenous Q12H Cisco Castillo MD   1 g at 03/12/19 2204   • midazolam (VERSED) injection 1 mg  1 mg Intravenous Q4H PRN Claudio Paulson DO   1 mg at 03/10/19 2257   • morphine injection 4 mg  4 mg Intravenous Q3H PRN Cisco Castillo MD   4 mg at 03/12/19 0541   • ondansetron (ZOFRAN) injection 4 mg  4 mg Intravenous Q6H PRN Cisco Castillo MD       • Pharmacy to Dose meropenem (MERREM)   Does not apply Continuous PRN Cisco Castillo MD       • propofol (DIPRIVAN) infusion 10 mg/mL 100 mL  5-50 mcg/kg/min Intravenous Titrated Ger Chino MD 24.7 mL/hr at 03/13/19 0717 50 mcg/kg/min at 03/13/19 0717   • sodium chloride 0.9 % flush 10 mL  10 mL Intravenous PRN Ger Chino MD       • sodium chloride 0.9 % flush 3 mL  3 mL Intravenous Q12H Cisco Castillo MD   3 mL at 03/12/19 2036   • sodium chloride 0.9 % flush 3-10 mL  3-10 mL Intravenous PRN Cisco Castillo MD           Past Medical History:  Past Medical History:   Diagnosis Date   • Bleeding from the nose    • Chronic kidney disease    • Diabetic polyneuropathy associated with type 1 diabetes mellitus (CMS/HCC) 12/18/2017   • GERD  (gastroesophageal reflux disease)    • Hypertension    • Tobacco abuse disorder 12/18/2017   • Type 1 diabetes mellitus with severe nonproliferative retinopathy of both eyes (CMS/[facility]) 12/18/2017       Past Surgical History:  Past Surgical History:   Procedure Laterality Date   • EYE SURGERY Right    • HERNIA REPAIR     • OTHER SURGICAL HISTORY      insulin pump insertion   • SKIN GRAFT         Family History  Family History   Problem Relation Age of Onset   • Hypertension Mother        Social History  Social History     Socioeconomic History   • Marital status: Single     Spouse name: Not on file   • Number of children: Not on file   • Years of education: Not on file   • Highest education level: Not on file   Social Needs   • Financial resource strain: Not on file   • Food insecurity - worry: Not on file   • Food insecurity - inability: Not on file   • Transportation needs - medical: Not on file   • Transportation needs - non-medical: Not on file   Occupational History   • Not on file   Tobacco Use   • Smoking status: Current Every Day Smoker   • Smokeless tobacco: Never Used   Substance and Sexual Activity   • Alcohol use: Not on file   • Drug use: Defer   • Sexual activity: Defer   Other Topics Concern   • Not on file   Social History Narrative   • Not on file         Review of Systems:  Unable to obtain due to intubated and sedated    Objective:  Patient Vitals for the past 24 hrs:   BP Temp Temp src Pulse Resp SpO2 Weight   03/13/19 0655 -- -- -- 83 16 96 % --   03/13/19 0643 -- -- -- 82 16 96 % --   03/13/19 0602 141/90 98.7 °F (37.1 °C) Oral 84 -- 97 % --   03/13/19 0500 149/93 -- -- 88 -- 99 % --   03/13/19 0403 160/96 99.2 °F (37.3 °C) Oral 88 -- 96 % --   03/13/19 0400 -- -- -- -- -- -- 88.1 kg (194 lb 3.2 oz)   03/13/19 0300 144/96 -- -- 90 -- 95 % --   03/13/19 0000 152/91 99.9 °F (37.7 °C) Oral 96 -- 93 % --   03/12/19 2200 143/90 -- -- 97 -- 93 % 90 kg (198 lb 6.4 oz)   03/12/19 2111 -- -- -- 92 -- 95 %  --   03/12/19 2104 -- -- -- 90 16 92 % --   03/12/19 2000 143/90 97.9 °F (36.6 °C) Axillary 91 -- 93 % --   03/12/19 1902 135/86 -- -- 92 -- 92 % --   03/12/19 1800 127/86 -- -- 90 16 92 % --   03/12/19 1715 127/84 -- -- 90 16 94 % --   03/12/19 1620 -- -- -- 91 -- 95 % --   03/12/19 1615 129/86 98.2 °F (36.8 °C) Oral 93 16 95 % --   03/12/19 1600 127/83 -- -- 91 -- -- --   03/12/19 1514 -- -- -- 93 16 97 % --   03/12/19 1507 -- -- -- 92 16 95 % --   03/12/19 1500 121/78 -- -- 92 16 95 % --   03/12/19 1400 124/78 -- -- 95 16 96 % --   03/12/19 1300 116/71 -- -- 97 16 94 % --   03/12/19 1245 121/75 98 °F (36.7 °C) Oral 98 16 94 % --   03/12/19 1200 139/86 -- -- 98 16 95 % --   03/12/19 1108 -- -- -- 99 -- 96 % --   03/12/19 1100 155/100 -- -- 101 16 96 % --   03/12/19 1051 -- -- -- 101 16 97 % --   03/12/19 1042 -- -- -- 99 16 96 % --   03/12/19 1000 126/76 -- -- 98 16 95 % --   03/12/19 0900 125/75 -- -- 96 16 94 % --       Intake/Output Summary (Last 24 hours) at 3/13/2019 0853  Last data filed at 3/13/2019 0400  Gross per 24 hour   Intake 1596 ml   Output 3025 ml   Net -1429 ml     General: intubated   Neck: supple, no JVD  Chest:  clear to auscultation bilaterally without respiratory distress  CVS: regular rate and rhythm  Abdominal: soft, nontender, positive bowel sounds  Extremities: lower extremity 1+ edema  Skin: warm and dry without rash  Neuro: no focal motor deficits    Labs:  Results from last 7 days   Lab Units 03/13/19  0357 03/12/19  0335 03/11/19  0603   WBC 10*3/mm3 6.63 5.94 6.14   HEMOGLOBIN g/dL 9.5* 9.4* 8.1*   HEMATOCRIT % 29.6* 29.2* 25.1*   PLATELETS 10*3/mm3 254 225 161         Results from last 7 days   Lab Units 03/13/19  0357 03/12/19  0335 03/11/19  0340   SODIUM mmol/L 145 140 138   POTASSIUM mmol/L 5.1 5.9* 5.8*   CHLORIDE mmol/L 112* 110 109   CO2 mmol/L 22.0* 21.0* 21.0*   BUN mg/dL 64* 66* 67*   CREATININE mg/dL 4.46* 4.51* 4.41*   CALCIUM mg/dL 8.5 8.5 8.5   BILIRUBIN mg/dL 0.4 0.5  0.3   ALK PHOS U/L 82 84 69   ALT (SGPT) U/L 27 28 32   AST (SGOT) U/L 20 18 20   GLUCOSE mg/dL 138* 212* 188*       Radiology:   Imaging Results (last 72 hours)     Procedure Component Value Units Date/Time    XR Chest 1 View [[id number]] Collected:  03/12/19 0731     Updated:  03/12/19 0736    Narrative:       EXAMINATION: XR CHEST 1 VW-     3/12/2019 3:40 AM CDT     HISTORY: Intubated Patient; J96.01-Acute respiratory failure with  hypoxia; J96.02-Acute respiratory failure with hypercapnia;  N18.9-Chronic kidney disease, unspecified; E87.5-Hyperkalemia;  E87.70-Fluid overload, unspecified; D64.9-Anemia, unspecified.     One view chest x-ray compared with yesterday.     Endotracheal tube remains in good position. The tip is approximately 5  cm above the gabrielle.  A nasogastric tube has been placed and is in good position.     Heart size is unchanged.     Bibasilar consolidation is the same or slightly worse. No improvement.     No pneumothorax.     Summary:  1. No significant change.  This report was finalized on 03/12/2019 07:33 by Dr. Papito Oglesby MD.    US Renal Bilateral [[id number]] Collected:  03/11/19 1515     Updated:  03/11/19 1519    Narrative:       EXAMINATION: US RENAL BILATERAL-     3/11/2019 1:40 PM CDT     HISTORY: CAMILO; J96.01-Acute respiratory failure with hypoxia;  J96.02-Acute respiratory failure with hypercapnia; N18.9-Chronic kidney  disease, unspecified; E87.5-Hyperkalemia; E87.70-Fluid overload,  unspecified; D64.9-Anemia, unspecified.     Detail is limited. There is no hydronephrosis.  Cortical thickness and cortical echogenicity is appropriate.     Right kidney = 1 39 x 51 x 68 mm.  Left kidney = 1 38 x 62 x 63 mm.     Pleural fluid is incidentally noted.     Summary:  1. Symmetric kidneys with no obstruction.  This report was finalized on 03/11/2019 15:16 by Dr. Papito Oglesby MD.    XR Abdomen KUB [[id number]] Collected:  03/11/19 1101     Updated:  03/11/19 1105    Narrative:        EXAMINATION: XR ABDOMEN KUB-     3/11/2019 10:17 AM CDT     HISTORY: ngt placement; J96.01-Acute respiratory failure with hypoxia;  J96.02-Acute respiratory failure with hypercapnia; N18.9-Chronic kidney  disease, unspecified; E87.5-Hyperkalemia; E87.70-Fluid overload,  unspecified; D64.9-Anemia, unspecified.     Portable radiograph of the upper abdomen and lower chest shows a well  positioned nasogastric tube extending to or beyond the mid stomach.     Bibasilar infiltrate noted.  Normal bones and normal visualized bowel gas pattern.     Summary:  1. Well-positioned nasogastric tube.  This report was finalized on 03/11/2019 11:02 by Dr. Papito Oglesby MD.    XR Chest 1 View [[id number]] Collected:  03/11/19 0657     Updated:  03/11/19 0701    Narrative:       EXAMINATION:   XR CHEST 1 VW-  3/11/2019 6:57 AM CDT     HISTORY: Patient intubated     Frontal upright radiograph of the chest 3/11/2019 3:19 AM CDT     COMPARISON: March 10, 2019.     FINDINGS:   Again bilateral interstitial air space filling infiltrates are present.  Says appearance of pulmonary edema. This is not significantly changed  compared to prior study March 10. Increased density in the right lung  base is noted in the left lung base. This may be posterior pleural  effusions. The cardiac silhouette is normal. Infiltrate tube is  unchanged in position..      The osseous structures and surrounding soft tissues demonstrate no acute  abnormality.       Impression:       1. Persistent bilateral interstitial air space filling infiltrates. Says  appearance pulmonary edema. Bilateral pleural effusions are noted. There  is no significant improvement from March 10.        This report was finalized on 03/11/2019 06:58 by Dr. Natalio Uriostegui MD.    XR Chest 1 View [[id number]] Collected:  03/10/19 0952     Updated:  03/10/19 0956    Narrative:       HISTORY: Dyspnea     CXR: A frontal view the chest is obtained.     COMPARISON: 12/2/2012     FINDINGS: Endotracheal  tube is appropriately positioned with the tip at  the T4 level. There are diffuse bilateral pulmonary opacities. Mixed  interstitial alveolar. There are small pleural effusions. Cardiac  silhouette is obscured by the pulmonary opacities. There is no  pneumothorax. There is no acute bony pathology.       Impression:       1. Diffuse mixed interstitial alveolar opacities with small pleural  effusions. Findings may be due to edema and/or pneumonia. Appropriate  positioning of the endotracheal tube.  This report was finalized on 03/10/2019 09:53 by Dr. Teri Schwartz MD.    CT Chest Without Contrast [[id number]] Collected:  03/10/19 0758     Updated:  03/10/19 0805    Narrative:       CT CHEST WO CONTRAST- 3/10/2019 7:35 AM CDT     HISTORY: respiratory distress, eval for pna vs edema      COMPARISON: None     DOSE LENGTH PRODUCT: 477 mGy cm. Automated exposure control was also  utilized to decrease patient radiation dose.     TECHNIQUE: Axial images the chest are obtained without IV contrast     FINDINGS:  The endotracheal tube is appropriate in position with the tip  above the gabrielle. Reactive mediastinal lymph nodes measure up to 10 mm.  The thoracic aorta appears normal in caliber. The heart is borderline  prominent in size. There are small pericardial effusion is identified.  There is a small to moderate right and a small left pleural effusion.     Limited images the upper abdomen demonstrate no adrenal nodules.     There is smooth interlobular septal thickening within the aerated upper  lobes. There are scattered patchy groundglass opacities seen throughout  the lung parenchyma with dense consolidation of the lower lobes. No  discrete endobronchial lesions are identified. There is no pneumothorax.     No focal destructive bony lesions are visualized.       Impression:       1. Bilateral diffuse patchy opacities are suggestive for multifocal  pneumonia. There is also interlobular septal thickening  suggesting  underlying edema. There is a small to moderate right and small left  pleural effusion with a very small pericardial effusion. Heart is  borderline enlarged. Thoracic aorta normal in caliber.  2. Appropriate positioning of the endotracheal tube.  This report was finalized on 03/10/2019 08:02 by Dr. Teri Schwartz MD.          Culture:  Blood Culture   Date Value Ref Range Status   03/10/2019 No growth at 2 days  Preliminary   03/10/2019 No growth at 2 days  Preliminary     Respiratory Culture   Date Value Ref Range Status   03/10/2019 Light growth (2+) Normal Respiratory [place]  Preliminary         Assessment   1.  Acute kidney injury; due to ATN--no renal recovery  2.  Baseline chronic kidney disease stage 4  3.  Hyperkalemia--improved  4.  Acute hypoxic respiratory failure  5.  Metabolic acidosis  6.  Anemia of chronic kidney disease [place] 2 [place] [facility] [place]  7.  Type 1 diabetes with nephropathy  8.  Essential hypertension  9.  Clinically volume overloaded    Plan:  1.  permcath planned for today with dialysis to follow  2.  Monitor labs    Aleksandar Conteh, MASOUD  3/13/2019  8:53 AM

## 2019-03-13 NOTE — PLAN OF CARE
Problem: Renal Failure/Kidney Injury, Acute (Adult)  Goal: Signs and Symptoms of Listed Potential Problems Will be Absent, Minimized or Managed (Renal Failure/Kidney Injury, Acute)  Outcome: Ongoing (interventions implemented as appropriate)  Pt now receiving dialysis.

## 2019-03-13 NOTE — PLAN OF CARE
Problem: Patient Care Overview  Goal: Plan of Care Review  Outcome: Ongoing (interventions implemented as appropriate)   03/13/19 0536   Plan of Care Review   Progress no change   Coping/Psychosocial   Plan of Care Reviewed With patient     Goal: Individualization and Mutuality  Outcome: Ongoing (interventions implemented as appropriate)    Goal: Discharge Needs Assessment  Outcome: Ongoing (interventions implemented as appropriate)    Goal: Interprofessional Rounds/Family Conf  Outcome: Ongoing (interventions implemented as appropriate)      Problem: Skin Injury Risk (Adult)  Goal: Identify Related Risk Factors and Signs and Symptoms  Outcome: Ongoing (interventions implemented as appropriate)    Goal: Skin Health and Integrity  Outcome: Ongoing (interventions implemented as appropriate)      Problem: Restraint, Nonbehavioral (Nonviolent)  Goal: Rationale and Justification  Outcome: Ongoing (interventions implemented as appropriate)    Goal: Nonbehavioral (Nonviolent) Restraint: Absence of Injury/Harm  Outcome: Ongoing (interventions implemented as appropriate)    Goal: Nonbehavioral (Nonviolent) Restraint: Achievement of Discontinuation Criteria  Outcome: Ongoing (interventions implemented as appropriate)    Goal: Nonbehavioral (Nonviolent) Restraint: Preservation of Dignity and Wellbeing  Outcome: Ongoing (interventions implemented as appropriate)      Problem: Fall Risk (Adult)  Goal: Identify Related Risk Factors and Signs and Symptoms  Outcome: Ongoing (interventions implemented as appropriate)    Goal: Absence of Fall  Outcome: Ongoing (interventions implemented as appropriate)

## 2019-03-13 NOTE — PROGRESS NOTES
[facility]  INPATIENT PROGRESS NOTE    Patient Name: Jose Shah  Date of Admission: 3/10/2019  Today's Date: 03/13/19  Length of Stay: 3  Primary Care Physician: Dai Boston APRN    Subjective   Chief Complaint: renal failure  HPI   No events overnight.    He is for permacath today.  He will initiate dialysis today.  He still display signs of volume overload on exam despite making good urine with diuretics.      Review of Systems   Unable to assess secondary to the patient's intubated and sedated state.     Objective    Temp:  [97.9 °F (36.6 °C)-99.9 °F (37.7 °C)] 98.7 °F (37.1 °C)  Heart Rate:  [] 83  Resp:  [16] 16  BP: (116-160)/() 141/90  FiO2 (%):  [35 %] 35 %  Physical Exam  Constitutional: He appears well-developed and well-nourished.   No distress on ventilatory support.  No family present.  Seen and discussed with his nurse, Bárbara.   Head: Normocephalic and atraumatic.   Eyes: Conjunctivae are normal. Pupils are equal, round, and reactive to light.   Neck: Neck supple. No JVD present.   Cardiovascular: Normal rate, regular rhythm, normal heart sounds and intact distal pulses. Exam reveals no gallop and no friction rub. No murmur heard.  Pulmonary/Chest: He has rales. Ventilated, diminished at the bases with rales.   Abdominal: Soft. Bowel sounds are normal. He exhibits no distension. There is no tenderness. There is no rebound and no guarding.   Musculoskeletal: Normal range of motion. He exhibits edema. He exhibits no tenderness or deformity.   Neurological: He displays normal reflexes. He exhibits normal muscle tone.   Sedate with propofol.  Withdraws to painful stimuli equally in all extremities.   Skin: Skin is warm and dry. No rash noted. He has an excoriated ulcer on the left foot on the dorsum involving the fourth toe predominantly.  This does not appear to be infected.     Intake/Output Summary (Last 24 hours) at 3/13/2019  0906  Last data filed at 3/13/2019 0400  Gross per 24 hour   Intake 1596 ml   Output 3025 ml   Net -1429 ml     Results Review:  I have reviewed the labs, radiology results, and diagnostic studies.    Laboratory Data:   Results from last 7 days   Lab Units 03/13/19  0357 03/12/19  0335 03/11/19  0603   WBC 10*3/mm3 6.63 5.94 6.14   HEMOGLOBIN g/dL 9.5* 9.4* 8.1*   HEMATOCRIT % 29.6* 29.2* 25.1*   PLATELETS 10*3/mm3 254 225 161     Results from last 7 days   Lab Units 03/13/19  0357 03/12/19  0335 03/11/19  0340   SODIUM mmol/L 145 140 138   POTASSIUM mmol/L 5.1 5.9* 5.8*   CHLORIDE mmol/L 112* 110 109   CO2 mmol/L 22.0* 21.0* 21.0*   BUN mg/dL 64* 66* 67*   CREATININE mg/dL 4.46* 4.51* 4.41*   CALCIUM mg/dL 8.5 8.5 8.5   BILIRUBIN mg/dL 0.4 0.5 0.3   ALK PHOS U/L 82 84 69   ALT (SGPT) U/L 27 28 32   AST (SGOT) U/L 20 18 20   GLUCOSE mg/dL 138* 212* 188*     Culture Data:   Blood Culture   Date Value Ref Range Status   03/10/2019 No growth at 3 days  Preliminary   03/10/2019 No growth at 3 days  Preliminary     Respiratory Culture   Date Value Ref Range Status   03/10/2019 Light growth (2+) Normal Respiratory Althea  Final     Radiology Data:   Imaging Results (last 24 hours)     Procedure Component Value Units Date/Time    XR Chest 1 View [[id number]] Collected:  03/13/19 0736     Updated:  03/13/19 0742    Narrative:       EXAMINATION: XR CHEST 1 VW- 3/13/2019 7:36 AM CDT     HISTORY: Intubated Patient; J96.01-Acute respiratory failure with  hypoxia; J96.02-Acute respiratory failure with hypercapnia;  N18.9-Chronic kidney disease, unspecified; E87.5-Hyperkalemia;  E87.70-Fluid overload, unspecified; D64.9-Anemia, unspecified;  N17.9-Acute kidney failure, unspecified.     REPORT: Comparison is made with the chest x-ray 3/12/2019.     The endotracheal tube has been retracted slightly, the tip projects over  the midline trachea at the T2-3 level. There is haziness over the lung  bases with diffuse atelectasis and  bilateral pleural effusions likely.  No pneumothorax is identified. The size appears normal. There is no  significant interval change.       Impression:       1. Stable appearance of the lungs with probable bilateral effusions and  extensive volume loss in the lung bases.  2. Slight retraction of the endotracheal tube.  This report was finalized on 03/13/2019 07:39 by Dr. Kenny Weathers MD.        Results from last 7 days   Lab Units 03/13/19  0224   PH, ARTERIAL pH units 7.423   PO2 ART mm Hg 76.2*   PCO2, ARTERIAL mm Hg 35.8   HCO3 ART mmol/L 23.3   FiO2 (%):  [35 %] 35 %  S RR:  [16] 16  PEEP/CPAP (cm H2O):  [6 cm H20] 6 cm H20  OR SUP:  [0 cm H20] 0 cm H20  MAP (cm H2O):  [12] 12    I have reviewed the patient's current medications.     Assessment/Plan     Active Hospital Problems    Diagnosis   • **Acute respiratory failure with hypoxia and hypercapnia (CMS/HCC)   • History of recent pneumonia   • Acute systolic congestive heart failure (CMS/HCC)   • Diabetic ulcer of toe of left foot associated with type 1 diabetes mellitus, limited to breakdown of skin (CMS/HCC)   • Acute kidney injury (CMS/HCC)   • Hyperkalemia   • Anemia   • Chronic kidney disease   • Type 1 diabetes, uncontrolled, with gastroparesis (CMS/HCC)   • Diabetic polyneuropathy associated with type 1 diabetes mellitus (CMS/HCC)   • Tobacco abuse disorder     Plan:  He was originally admitted on 3/10 by Dr. Castillo.  He presented with acute hypoxic and hypercarbic respiratory failure.  History was given that he had recently been discharged from [facility] in [place], [place]. He was treated there for pneumonia per report.  He required intubation.  He remains mechanically ventilated.     Pulmonary has been consulted to assist with his mechanical ventilation.  Continue inhaled bronchodilators.  He was being covered broadly with vancomycin, doxycycline, and meropenem.  I stopped doxycycline on 3/11.  He received 1 dose of  levofloxacin in the ER on 3/10.  Urinary antigens are negative and sputum/blood cultures show no growth thus far.     Nephrology has been consulted.  He was found to be in acute renal failure with hyperkalemia.  He persistent renal failure, but his potassium level is normal today.  He is going to be taken for a permacath by Dr. Avila and initiate hemodialysis today.      Echocardiogram was obtained and shows an ejection fraction of 46-50%.  He will eventually need to have this more formally addressed.  He also has some hypokinetic segments on echo, which would raise concern for possible underlying coronary disease even at a young age due to his underlying uncontrolled type 1 diabetes.     Monitor hemoglobin; improved.  No signs of bleeding.  Anemia substrates that were checked in February 2019 were normal.     He has a chronic ulcer of the left fourth toe that does not appear to be infected at this point in time.  He does see wound care as an outpatient for this.  We will need to address this more formally at some point as well.     He is a long-standing type I diabetic.  His current hemoglobin A1c is 5.3.  Continue Accu-Cheks and sliding scale insulin for now.     Lovenox for DVT prophylaxis.  Pepcid for peptic ulcer prophylaxis while on ventilatory support.    Darius Sharma,    03/13/19   9:05 AM

## 2019-03-13 NOTE — PROGRESS NOTES
PULMONARY AND CRITICAL CARE PROGRESS NOTE - [facility]    Patient: Jose Shah    1985    MR# [id number]    Acct# [id number]  03/13/19   9:06 AM  Referring Provider: Darius Sharma DO    Chief Complaint: Mechanically ventilated    Interval history: Patient remains intubated and sedated.  Plans for permacath placement today.  Chest x-ray and ABG remained stable.  No family at the bedside.  Renal failure and hyperkalemia continue to be an issue.  Acetone positive    Meds:    chlorhexidine 15 mL Mouth/Throat Q12H   enoxaparin 30 mg Subcutaneous Q24H   famotidine 20 mg Intravenous Daily   insulin lispro 2-9 Units Subcutaneous Q6H   ipratropium-albuterol 3 mL Nebulization 4x Daily - RT   iron sucrose (VENOFER) IVPB 200 mg Intravenous Q24H   meropenem 1 g Intravenous Q12H   sodium chloride 3 mL Intravenous Q12H       propofol 5-50 mcg/kg/min Last Rate: 50 mcg/kg/min (03/13/19 0717)     Review of Systems:     Cannot obtain due to mechanical ventilation.  The patient notably is critically ill and connected to a ventilator.  As such patient cannot communicate and provide any history whatsoever, including any history of present illness or interval history since arrival or review of systems. The interested reviewer may note this fact, as an attempt has been made at collecting and documenting these portions of the patient history, but this information is unobtainable despite attempted review and therefore cannot be documented at this time.     Ventilator Settings:     Vt (Set, L): 0.55 L  Resp Rate (Set): 16  Pressure Support (cm H2O): 0 cm H20  FiO2 (%): 35 %  PEEP/CPAP (cm H2O): 6 cm H20  Minute Ventilation (L/min) (Obs): 9.03 L/min  Resp Rate (Observed) Vent: 16  I:E Ratio (Set): 1:2.80  I:E Ratio (Obs): 1:2.8  PIP Observed (cm H2O): 25 cm H2O     Physical Exam:  Temp:  [97.9 °F (36.6 °C)-99.9 °F (37.7 °C)] 98.7 °F (37.1 °C)  Heart Rate:  [] 83  Resp:  [16] 16  BP: (116-160)/()  141/90  FiO2 (%):  [35 %] 35 %    Intake/Output Summary (Last 24 hours) at 3/13/2019 0906  Last data filed at 3/13/2019 0400  Gross per 24 hour   Intake 1596 ml   Output 3025 ml   Net -1429 ml     SpO2 Percentage    03/13/19 0602 03/13/19 0643 03/13/19 0655   SpO2: 97% 96% 96%      Physical Exam:    Constitutional: He appears well-developed and well-nourished. He is sleeping.  Non-toxic appearance. He does not have a sickly appearance. He does not appear ill. No distress. He is sedated and intubated.   HENT:   Nose: Nose normal.   Eyes: No scleral icterus.   Neck: Neck supple. No JVD present.   Cardiovascular: Normal rate and regular rhythm.   No murmur heard.  Pulmonary/Chest: No accessory muscle usage. He is intubated. No respiratory distress. He has decreased breath sounds. He has no wheezes. He has no rhonchi.   Abdominal: Soft. He exhibits no distension. There is no tenderness. There is no guarding.   Musculoskeletal: He exhibits edema. He exhibits no deformity.   Lymphadenopathy:     He has no cervical adenopathy.   Neurological: He is unresponsive.   Skin: Skin is dry. No rash noted. He is not diaphoretic.   Psychiatric: His mood appears not anxious. He is not agitated        Results from last 7 days   Lab Units 03/13/19  0357 03/12/19  0335 03/11/19  0603   WBC 10*3/mm3 6.63 5.94 6.14   HEMOGLOBIN g/dL 9.5* 9.4* 8.1*   PLATELETS 10*3/mm3 254 225 161     Results from last 7 days   Lab Units 03/13/19  0357 03/12/19  0335 03/11/19  0340   SODIUM mmol/L 145 140 138   POTASSIUM mmol/L 5.1 5.9* 5.8*   BUN mg/dL 64* 66* 67*   CREATININE mg/dL 4.46* 4.51* 4.41*     Results from last 7 days   Lab Units 03/13/19  0224 03/12/19  0236 03/11/19  0505   PH, ARTERIAL pH units 7.423 7.407 7.335*   PCO2, ARTERIAL mm Hg 35.8 34.3* 41.1   PO2 ART mm Hg 76.2* 64.8* 80.9*   FIO2 % 35 35 50     Blood Culture   Date Value Ref Range Status   03/10/2019 No growth at 24 hours  Preliminary   03/10/2019 No growth at 24 hours   Preliminary     Recent films:  Xr Chest 1 View    Result Date: 3/13/2019  1. Stable appearance of the lungs with probable bilateral effusions and extensive volume loss in the lung bases. 2. Slight retraction of the endotracheal tube. This report was finalized on 03/13/2019 07:39 by Dr. Kenny Weathers MD.    Films reviewed personally by me.  My interpretation: Endotracheal tube in position, bilateral pleural effusions persist with bilateral interstitial infiltrates    Pulmonary Assessment:  1. Acute respiratory failure requiring mechanical ventilation secondary to bilateral lung infiltrates  2. Poorly controlled diabetes  3. Acute on chronic renal failure  4. Suspected congestive heart failure or fluid overload related to acute on chronic renal failure, with elevated BNP  5. Bilateral pleural effusions likely related to #1  6. Possible incompletely controlled pneumonia or new pneumonia or recent pneumonia with resolution but persistent opacities which have not had time to clear  7. History of vitamin D deficiency  8. Hyperkalemia likely related to renal failure  9. Metabolic acidosis    Recommend:   · No adjustments made to the ventilator.  Not ready for weaning trials  · Acetone level positive  · Stress ulcer and DVT prophylaxis  · Continue broad-spectrum antibiotic coverage  · Awaiting respiratory culture  · Diabetic management per attending.  He was acetone positive   · Prognosis guarded    Electronically signed by MASOUD Stoner on 3/13/2019 at 9:06 AM     Physician substantive portion:  Pt remains sedated. Exam shows few crackles.  abd soft.  Anticipate dialysis catheter.  Acidosis better.  Will begin spontaneous breathing trials tomorrow unless additional problems develop.  Dvt, ulcer prophylaxis.  Atelectasis noted on cxr.    I have seen and examined patient personally, performing a face-to-face diagnostic evaluation with plan of care reviewed and developed with APRN and nursing staff. I have addended  and/or modified the above history of present illness, physical examination, and assessment and plan to reflect my findings and impressions. Essential elements of the care plan were discussed with APRN above.  Agree with findings and assessment/plan as documented above.    Electronically signed by Sascha Chiang MD, on 3/13/2019, 2:22 PM

## 2019-03-13 NOTE — PLAN OF CARE
Problem: Pneumonia (Adult)  Goal: Signs and Symptoms of Listed Potential Problems Will be Absent, Minimized or Managed (Pneumonia)  Outcome: Ongoing (interventions implemented as appropriate)  Pt continues on ventilator today.

## 2019-03-13 NOTE — OP NOTE
Jose Shah  3/10/2019 - 3/13/2019     PREOPERATIVE DIAGNOSIS: Chronic kidney disease, unspecified CKD stage [N18.9]  Acute kidney injury (CMS/HCC) [N17.9]     POSTOPERATIVE DIAGNOSIS: Post-Op Diagnosis Codes:     * Chronic kidney disease, unspecified CKD stage [N18.9]     * Acute kidney injury (CMS/HCC) [N17.9]     PROCEDURE PERFORMED:   1.  Ultrasound-guided cannulation of the right internal jugular vein  2.  Placement of a 16 Lao by 23 cm tunneled PermCath  3.  Radiographic supervision and interpretation     SURGEON: Manjit Avila DO      ANESTHESIA: MAC    PREPARATION: Routine.    STAFF: Circulator: Anusha Palacio RN  Scrub Person: Sanchez Muhammad Melissa K  Assistant: Kalie Oropeza  Vascular Radiology Technician: Lucia Mcdaniels    ESTIMATED BLOOD LOSS: 10 mL    SPECIMENS: None    COMPLICATIONS: None    INDICATIONS: Jose Shah is a 33 y.o. male who was recently discharged from [facility] and diagnosed with pneumonia.  He did have progressively worsening shortness of breath after discharge.  Per notes he had complaints of chest pain and chest pressure.  He presented for further workup and management and was found to be in respiratory distress with altered mental status.  They did proceed with intubation and mechanical ventilation.  He is sedated and unable to provide any history.  There is no family at bedside.  He does have a history of noncompliance and has been type I diabetic since 13 years old.  He has a wound on his left foot being managed in wound care.  He is also a smoker.  He does have chronic kidney disease with worsening kidney function.  His creatinine is 4.51 with a GFR of 15.  We have been consulted for PermCath placement to begin dialysis. The indications, risks, and possible complications of the procedure were explained to the patient, who voiced understanding and wished to proceed with surgery.     PROCEDURE IN DETAIL:     The patient was  taken to the operating room and placed on the operating table in a supine position. After Mac anesthesia was obtained, the right neck and chest was prepped and draped in a sterile manner.  Under ultrasound guidance and using a micropuncture technique the right internal jugular vein was cannulated and a micro-sheath was placed.  The J-wire was advanced on into the IVC under fluoroscopic guidance.  Next, 10 mL of 0.5% Marcaine with epinephrine was used to infiltrate subcutaneous tract.  2 small stab incisions were made with the 11 blade.  The tunneling device attached to the catheter was then tunneled in a subcutaneous manner up through the neck insertion site.  Next, serial dilatation was then made of the internal jugular vein under fluoroscopic guidance.  Lastly, the tear-away sheath was placed and the inner dilator and wire were removed.  The catheter was placed through the tear-away sheath with the tip ending in the right atrial/SVC junction.  There were no kinks in the catheter and both ports had good blood return.  Each port was flushed with heparinized saline and straight heparin 1000 units per mL.  The caps were applied.  The catheter was secured to skin with a 2-0 nylon.  The neck insertion site was closed with a 4-0 Monocryl in a subcuticular fashion.  The wounds were then cleaned.  Sterile dressings were applied. The patient tolerated the procedure well. Sponge and needle counts were correct. The patient was then awakened in the operating room and taken to the recovery room in good condition.    Manjit Avila,   3/13/2019  2:22 PM

## 2019-03-13 NOTE — PLAN OF CARE
Problem: Patient Care Overview  Goal: Plan of Care Review   03/13/19 1840   OTHER   Outcome Summary Permacath inserted in OR today. First dialysis completed this afternoon, with 1.8 liter off. Plans are to have weaning trials tomorrow.   Coping/Psychosocial   Plan of Care Reviewed With patient;family       Problem: Skin Injury Risk (Adult)  Goal: Identify Related Risk Factors and Signs and Symptoms  Outcome: Ongoing (interventions implemented as appropriate)   03/13/19 1840   Skin Injury Risk (Adult)   Related Risk Factors (Skin Injury Risk) cognitive impairment;critical care admission;mobility impaired       Problem: Restraint, Nonbehavioral (Nonviolent)  Goal: Rationale and Justification  Outcome: Ongoing (interventions implemented as appropriate)      Problem: Fall Risk (Adult)  Goal: Identify Related Risk Factors and Signs and Symptoms  Outcome: Ongoing (interventions implemented as appropriate)

## 2019-03-13 NOTE — ANESTHESIA POSTPROCEDURE EVALUATION
"Patient: Jose Shah    Procedure Summary     Date:  03/13/19 Room / Location:  [facility] OR  /  PAD HYBRID OR 12    Anesthesia Start:  1402 Anesthesia Stop:  1432    Procedure:  HEMODIALYSIS CATHETER INSERTION (Right Neck) Diagnosis:       Chronic kidney disease, unspecified CKD stage      Acute kidney injury (CMS/HCC)      (Chronic kidney disease, unspecified CKD stage [N18.9])      (Acute kidney injury (CMS/HCC) [N17.9])    Surgeon:  Manjit Avila DO Provider:  All Carrillo CRNA    Anesthesia Type:  general ASA Status:  3          Anesthesia Type: general  Last vitals  BP   157/93 (03/13/19 0802)   Temp   97.5 °F (36.4 °C) (03/13/19 0802)   Pulse   89 (03/13/19 1026)   Resp   16 (03/13/19 1026)     SpO2   100 % (03/13/19 1026)     Post Anesthesia Care and Evaluation    Patient location during evaluation: PACU  Patient participation: complete - patient participated  Level of consciousness: awake and alert  Pain management: adequate  Airway patency: patent  Anesthetic complications: No anesthetic complications  PONV Status: none  Cardiovascular status: acceptable and hemodynamically stable  Respiratory status: acceptable  Hydration status: acceptable    Comments: Blood pressure 157/93, pulse 89, temperature 97.5 °F (36.4 °C), temperature source Axillary, resp. rate 16, height 177.8 cm (70\"), weight 88.1 kg (194 lb 3.2 oz), SpO2 100 %.    Patient discharged from PACU based upon Aries score. Please see RN notes for further details      "

## 2019-03-13 NOTE — PLAN OF CARE
Problem: Patient Care Overview  Goal: Plan of Care Review  Outcome: Ongoing (interventions implemented as appropriate)   03/13/19 9635   OTHER   Outcome Summary Pt is day 4 NPO. He continues to be on mech vent with propofol. Avg 3 day calorie intake from propofol is 621.5 kcal (29% of estimated kcal needs). If Pt unable to wean from vent and start PO diet withing 24-48 hrs, he may benefit from the initiation of enteral nutrition. Recommend initiation of Peptamen AF at 25 ml/hr; Auto water flush 30 ml/hr. RDN will continue to follow.   Plan of Care Review   Progress no change

## 2019-03-14 ENCOUNTER — APPOINTMENT (OUTPATIENT)
Dept: GENERAL RADIOLOGY | Facility: HOSPITAL | Age: 34
End: 2019-03-14

## 2019-03-14 LAB
ALBUMIN SERPL-MCNC: 2.5 G/DL (ref 3.5–5)
ALBUMIN/GLOB SERPL: 1 G/DL (ref 1.1–2.5)
ALP SERPL-CCNC: 76 U/L (ref 24–120)
ALT SERPL W P-5'-P-CCNC: 17 U/L (ref 0–54)
ANION GAP SERPL CALCULATED.3IONS-SCNC: 16 MMOL/L (ref 4–13)
ARTERIAL PATENCY WRIST A: ABNORMAL
AST SERPL-CCNC: 20 U/L (ref 7–45)
ATMOSPHERIC PRESS: 743 MMHG
BASE EXCESS BLDA CALC-SCNC: -3 MMOL/L (ref 0–2)
BASOPHILS # BLD AUTO: 0.07 10*3/MM3 (ref 0–0.2)
BASOPHILS NFR BLD AUTO: 1.3 % (ref 0–2)
BDY SITE: ABNORMAL
BILIRUB SERPL-MCNC: 0.4 MG/DL (ref 0.1–1)
BODY TEMPERATURE: 37 C
BUN BLD-MCNC: 45 MG/DL (ref 5–21)
BUN/CREAT SERPL: 13.8 (ref 7–25)
CALCIUM SPEC-SCNC: 8.3 MG/DL (ref 8.4–10.4)
CHLORIDE SERPL-SCNC: 105 MMOL/L (ref 98–110)
CO2 SERPL-SCNC: 20 MMOL/L (ref 24–31)
CREAT BLD-MCNC: 3.26 MG/DL (ref 0.5–1.4)
DEPRECATED RDW RBC AUTO: 53.3 FL (ref 40–54)
EOSINOPHIL # BLD AUTO: 0.24 10*3/MM3 (ref 0–0.7)
EOSINOPHIL NFR BLD AUTO: 4.5 % (ref 0–4)
ERYTHROCYTE [DISTWIDTH] IN BLOOD BY AUTOMATED COUNT: 15.5 % (ref 12–15)
GFR SERPL CREATININE-BSD FRML MDRD: 22 ML/MIN/1.73
GLOBULIN UR ELPH-MCNC: 2.4 GM/DL
GLUCOSE BLD-MCNC: 178 MG/DL (ref 70–100)
GLUCOSE BLDC GLUCOMTR-MCNC: 154 MG/DL (ref 70–130)
GLUCOSE BLDC GLUCOMTR-MCNC: 170 MG/DL (ref 70–130)
GLUCOSE BLDC GLUCOMTR-MCNC: 179 MG/DL (ref 70–130)
GLUCOSE BLDC GLUCOMTR-MCNC: 190 MG/DL (ref 70–130)
GLUCOSE BLDC GLUCOMTR-MCNC: 197 MG/DL (ref 70–130)
HCO3 BLDA-SCNC: 22.1 MMOL/L (ref 20–26)
HCT VFR BLD AUTO: 27.8 % (ref 40–52)
HGB BLD-MCNC: 8.8 G/DL (ref 14–18)
HOROWITZ INDEX BLD+IHG-RTO: 60 %
IMM GRANULOCYTES # BLD AUTO: 0.01 10*3/MM3 (ref 0–0.05)
IMM GRANULOCYTES NFR BLD AUTO: 0.2 % (ref 0–5)
LYMPHOCYTES # BLD AUTO: 1.31 10*3/MM3 (ref 0.72–4.86)
LYMPHOCYTES NFR BLD AUTO: 24.3 % (ref 15–45)
Lab: ABNORMAL
MCH RBC QN AUTO: 29.5 PG (ref 28–32)
MCHC RBC AUTO-ENTMCNC: 31.7 G/DL (ref 33–36)
MCV RBC AUTO: 93.3 FL (ref 82–95)
MODALITY: ABNORMAL
MONOCYTES # BLD AUTO: 0.47 10*3/MM3 (ref 0.19–1.3)
MONOCYTES NFR BLD AUTO: 8.7 % (ref 4–12)
NEUTROPHILS # BLD AUTO: 3.28 10*3/MM3 (ref 1.87–8.4)
NEUTROPHILS NFR BLD AUTO: 61 % (ref 39–78)
NRBC BLD AUTO-RTO: 0 /100 WBC (ref 0–0)
PCO2 BLDA: 38.6 MM HG (ref 35–45)
PEEP RESPIRATORY: 6 CM[H2O]
PH BLDA: 7.37 PH UNITS (ref 7.35–7.45)
PLATELET # BLD AUTO: 229 10*3/MM3 (ref 130–400)
PMV BLD AUTO: 9.4 FL (ref 6–12)
PO2 BLDA: 96.1 MM HG (ref 83–108)
POTASSIUM BLD-SCNC: 4.9 MMOL/L (ref 3.5–5.3)
PROT SERPL-MCNC: 4.9 G/DL (ref 6.3–8.7)
RBC # BLD AUTO: 2.98 10*6/MM3 (ref 4.8–5.9)
SAO2 % BLDCOA: 97.9 % (ref 94–99)
SET MECH RESP RATE: 16
SODIUM BLD-SCNC: 141 MMOL/L (ref 135–145)
VENTILATOR MODE: AC
VT ON VENT VENT: 550 ML
WBC NRBC COR # BLD: 5.38 10*3/MM3 (ref 4.8–10.8)

## 2019-03-14 PROCEDURE — 82803 BLOOD GASES ANY COMBINATION: CPT

## 2019-03-14 PROCEDURE — 25010000002 PROPOFOL 1000 MG/ML EMULSION: Performed by: EMERGENCY MEDICINE

## 2019-03-14 PROCEDURE — 25010000002 ENOXAPARIN PER 10 MG: Performed by: INTERNAL MEDICINE

## 2019-03-14 PROCEDURE — 85025 COMPLETE CBC W/AUTO DIFF WBC: CPT | Performed by: INTERNAL MEDICINE

## 2019-03-14 PROCEDURE — 99233 SBSQ HOSP IP/OBS HIGH 50: CPT | Performed by: INTERNAL MEDICINE

## 2019-03-14 PROCEDURE — 94799 UNLISTED PULMONARY SVC/PX: CPT

## 2019-03-14 PROCEDURE — 94003 VENT MGMT INPAT SUBQ DAY: CPT

## 2019-03-14 PROCEDURE — 94770: CPT

## 2019-03-14 PROCEDURE — 25010000002 FUROSEMIDE PER 20 MG: Performed by: NURSE PRACTITIONER

## 2019-03-14 PROCEDURE — 25010000002 LORAZEPAM PER 2 MG: Performed by: INTERNAL MEDICINE

## 2019-03-14 PROCEDURE — 25010000002 MORPHINE PER 10 MG: Performed by: SURGERY

## 2019-03-14 PROCEDURE — 82962 GLUCOSE BLOOD TEST: CPT

## 2019-03-14 PROCEDURE — 25010000002 ONDANSETRON PER 1 MG: Performed by: INTERNAL MEDICINE

## 2019-03-14 PROCEDURE — 63710000001 INSULIN LISPRO (HUMAN) PER 5 UNITS: Performed by: INTERNAL MEDICINE

## 2019-03-14 PROCEDURE — 5A1D70Z PERFORMANCE OF URINARY FILTRATION, INTERMITTENT, LESS THAN 6 HOURS PER DAY: ICD-10-PCS | Performed by: NURSE PRACTITIONER

## 2019-03-14 PROCEDURE — 25010000002 IRON SUCROSE PER 1 MG: Performed by: INTERNAL MEDICINE

## 2019-03-14 PROCEDURE — 71045 X-RAY EXAM CHEST 1 VIEW: CPT

## 2019-03-14 PROCEDURE — 25010000002 MEROPENEM PER 100 MG: Performed by: INTERNAL MEDICINE

## 2019-03-14 PROCEDURE — 80053 COMPREHEN METABOLIC PANEL: CPT | Performed by: INTERNAL MEDICINE

## 2019-03-14 PROCEDURE — 36600 WITHDRAWAL OF ARTERIAL BLOOD: CPT

## 2019-03-14 RX ORDER — HALOPERIDOL 5 MG/ML
2 INJECTION INTRAMUSCULAR EVERY 4 HOURS PRN
Status: DISCONTINUED | OUTPATIENT
Start: 2019-03-14 | End: 2019-03-16

## 2019-03-14 RX ORDER — HYDRALAZINE HYDROCHLORIDE 20 MG/ML
20 INJECTION INTRAMUSCULAR; INTRAVENOUS EVERY 8 HOURS
Status: DISCONTINUED | OUTPATIENT
Start: 2019-03-14 | End: 2019-03-17

## 2019-03-14 RX ORDER — HEPARIN SODIUM 1000 [USP'U]/ML
3600 INJECTION, SOLUTION INTRAVENOUS; SUBCUTANEOUS ONCE
Status: DISCONTINUED | OUTPATIENT
Start: 2019-03-14 | End: 2019-03-19 | Stop reason: HOSPADM

## 2019-03-14 RX ORDER — METOPROLOL TARTRATE 5 MG/5ML
5 INJECTION INTRAVENOUS EVERY 6 HOURS
Status: DISCONTINUED | OUTPATIENT
Start: 2019-03-14 | End: 2019-03-14

## 2019-03-14 RX ORDER — METOPROLOL TARTRATE 5 MG/5ML
10 INJECTION INTRAVENOUS EVERY 6 HOURS
Status: DISCONTINUED | OUTPATIENT
Start: 2019-03-14 | End: 2019-03-17

## 2019-03-14 RX ORDER — FUROSEMIDE 10 MG/ML
40 INJECTION INTRAMUSCULAR; INTRAVENOUS ONCE
Status: COMPLETED | OUTPATIENT
Start: 2019-03-14 | End: 2019-03-14

## 2019-03-14 RX ORDER — HYDRALAZINE HYDROCHLORIDE 25 MG/1
25 TABLET, FILM COATED ORAL EVERY 8 HOURS SCHEDULED
Status: DISCONTINUED | OUTPATIENT
Start: 2019-03-14 | End: 2019-03-14

## 2019-03-14 RX ORDER — ATORVASTATIN CALCIUM 40 MG/1
80 TABLET, FILM COATED ORAL NIGHTLY
Status: DISCONTINUED | OUTPATIENT
Start: 2019-03-14 | End: 2019-03-19 | Stop reason: HOSPADM

## 2019-03-14 RX ORDER — METOPROLOL TARTRATE 50 MG/1
50 TABLET, FILM COATED ORAL EVERY 12 HOURS SCHEDULED
Status: DISCONTINUED | OUTPATIENT
Start: 2019-03-14 | End: 2019-03-14

## 2019-03-14 RX ORDER — HYDRALAZINE HYDROCHLORIDE 20 MG/ML
10 INJECTION INTRAMUSCULAR; INTRAVENOUS EVERY 6 HOURS PRN
Status: DISCONTINUED | OUTPATIENT
Start: 2019-03-14 | End: 2019-03-19 | Stop reason: HOSPADM

## 2019-03-14 RX ORDER — AMLODIPINE BESYLATE 5 MG/1
5 TABLET ORAL DAILY
Status: DISCONTINUED | OUTPATIENT
Start: 2019-03-14 | End: 2019-03-19 | Stop reason: HOSPADM

## 2019-03-14 RX ADMIN — PROPOFOL 50 MCG/KG/MIN: 10 INJECTION, EMULSION INTRAVENOUS at 05:23

## 2019-03-14 RX ADMIN — INSULIN LISPRO 2 UNITS: 100 INJECTION, SOLUTION INTRAVENOUS; SUBCUTANEOUS at 05:22

## 2019-03-14 RX ADMIN — IPRATROPIUM BROMIDE AND ALBUTEROL SULFATE 3 ML: 2.5; .5 SOLUTION RESPIRATORY (INHALATION) at 07:25

## 2019-03-14 RX ADMIN — MORPHINE SULFATE 2 MG: 2 INJECTION, SOLUTION INTRAMUSCULAR; INTRAVENOUS at 22:22

## 2019-03-14 RX ADMIN — INSULIN LISPRO 2 UNITS: 100 INJECTION, SOLUTION INTRAVENOUS; SUBCUTANEOUS at 13:13

## 2019-03-14 RX ADMIN — PROPOFOL 50 MCG/KG/MIN: 10 INJECTION, EMULSION INTRAVENOUS at 09:04

## 2019-03-14 RX ADMIN — FUROSEMIDE 40 MG: 10 INJECTION, SOLUTION INTRAMUSCULAR; INTRAVENOUS at 13:04

## 2019-03-14 RX ADMIN — IPRATROPIUM BROMIDE AND ALBUTEROL SULFATE 3 ML: 2.5; .5 SOLUTION RESPIRATORY (INHALATION) at 10:52

## 2019-03-14 RX ADMIN — FAMOTIDINE 20 MG: 10 INJECTION INTRAVENOUS at 09:05

## 2019-03-14 RX ADMIN — ENOXAPARIN SODIUM 30 MG: 30 INJECTION SUBCUTANEOUS at 09:05

## 2019-03-14 RX ADMIN — METOPROLOL TARTRATE 50 MG: 50 TABLET ORAL at 15:49

## 2019-03-14 RX ADMIN — LABETALOL 20 MG/4 ML (5 MG/ML) INTRAVENOUS SYRINGE 10 MG: at 04:59

## 2019-03-14 RX ADMIN — IRON SUCROSE 200 MG: 20 INJECTION, SOLUTION INTRAVENOUS at 13:04

## 2019-03-14 RX ADMIN — ONDANSETRON HYDROCHLORIDE 4 MG: 2 INJECTION INTRAMUSCULAR; INTRAVENOUS at 16:15

## 2019-03-14 RX ADMIN — IPRATROPIUM BROMIDE AND ALBUTEROL SULFATE 3 ML: 2.5; .5 SOLUTION RESPIRATORY (INHALATION) at 19:17

## 2019-03-14 RX ADMIN — CHLORHEXIDINE GLUCONATE 15 ML: 1.2 RINSE ORAL at 09:05

## 2019-03-14 RX ADMIN — AMLODIPINE BESYLATE 5 MG: 5 TABLET ORAL at 15:52

## 2019-03-14 RX ADMIN — IPRATROPIUM BROMIDE AND ALBUTEROL SULFATE 3 ML: 2.5; .5 SOLUTION RESPIRATORY (INHALATION) at 15:07

## 2019-03-14 RX ADMIN — SODIUM CHLORIDE, PRESERVATIVE FREE 3 ML: 5 INJECTION INTRAVENOUS at 09:05

## 2019-03-14 RX ADMIN — SODIUM CHLORIDE, PRESERVATIVE FREE 3 ML: 5 INJECTION INTRAVENOUS at 21:26

## 2019-03-14 RX ADMIN — MEROPENEM 1 G: 1 INJECTION, POWDER, FOR SOLUTION INTRAVENOUS at 15:49

## 2019-03-14 RX ADMIN — LABETALOL 20 MG/4 ML (5 MG/ML) INTRAVENOUS SYRINGE 10 MG: at 12:22

## 2019-03-14 RX ADMIN — PROPOFOL 50 MCG/KG/MIN: 10 INJECTION, EMULSION INTRAVENOUS at 02:00

## 2019-03-14 RX ADMIN — INSULIN LISPRO 2 UNITS: 100 INJECTION, SOLUTION INTRAVENOUS; SUBCUTANEOUS at 20:07

## 2019-03-14 RX ADMIN — LORAZEPAM 1 MG: 2 INJECTION INTRAMUSCULAR; INTRAVENOUS at 07:40

## 2019-03-14 RX ADMIN — METOPROLOL TARTRATE 10 MG: 5 INJECTION, SOLUTION INTRAVENOUS at 21:26

## 2019-03-14 RX ADMIN — LORAZEPAM 1 MG: 2 INJECTION INTRAMUSCULAR; INTRAVENOUS at 11:12

## 2019-03-14 NOTE — PROGRESS NOTES
PULMONARY AND CRITICAL CARE PROGRESS NOTE - [facility]    Patient: Jose Shah    1985    MR# [id number]    Acct# [id number]  03/14/19   9:13 AM  Referring Provider: Darius Sharma DO    Chief Complaint: Mechanically ventilated    Interval history: Patient remains intubated and sedated murmur he does open his eyes to voice, he follows commands and gave a thumbs up when asked if he was doing okay.  Undergoing dialysis at this point in time.  Chest x-ray and ABG remained stable. No family at the bedside.  Renal failure and hyperkalemia continue to be an issue.    Meds:    chlorhexidine 15 mL Mouth/Throat Q12H   enoxaparin 30 mg Subcutaneous Q24H   famotidine 20 mg Intravenous Daily   furosemide 40 mg Intravenous Once   heparin (porcine) 3,600 Units Intracatheter Once   heparin (porcine) 3,600 Units Intracatheter Once   insulin lispro 2-9 Units Subcutaneous Q6H   ipratropium-albuterol 3 mL Nebulization 4x Daily - RT   iron sucrose (VENOFER) IVPB 200 mg Intravenous Q24H   sodium chloride 3 mL Intravenous Q12H       propofol 5-50 mcg/kg/min Last Rate: 50 mcg/kg/min (03/14/19 0904)     Review of Systems:     Cannot obtain due to mechanical ventilation.  The patient notably is critically ill and connected to a ventilator.  As such patient cannot communicate and provide any history whatsoever, including any history of present illness or interval history since arrival or review of systems. The interested reviewer may note this fact, as an attempt has been made at collecting and documenting these portions of the patient history, but this information is unobtainable despite attempted review and therefore cannot be documented at this time.     Ventilator Settings:     Vt (Set, L): 0.55 L  Resp Rate (Set): 16  Pressure Support (cm H2O): 0 cm H20  FiO2 (%): 35 %  PEEP/CPAP (cm H2O): 6 cm H20  Minute Ventilation (L/min) (Obs): 10.4 L/min  Resp Rate (Observed) Vent: 19  I:E Ratio (Set): 1:2.80  I:E Ratio  (Obs): 1:1.5  PIP Observed (cm H2O): 23 cm H2O     Physical Exam:  Temp:  [97.2 °F (36.2 °C)-97.9 °F (36.6 °C)] 97.9 °F (36.6 °C)  Heart Rate:  [74-91] 80  Resp:  [16-23] 19  BP: (109-166)/() 150/93  FiO2 (%):  [35 %-60 %] 35 %    Intake/Output Summary (Last 24 hours) at 3/14/2019 0913  Last data filed at 3/14/2019 0355  Gross per 24 hour   Intake 850.2 ml   Output 3300 ml   Net -2449.8 ml     SpO2 Percentage    03/14/19 0620 03/14/19 0630 03/14/19 0725   SpO2: 97% 99% 95%      Physical Exam:    Constitutional: He appears well-developed and well-nourished. He is sleeping.  Non-toxic appearance. He does not have a sickly appearance. He does not appear ill. No distress. He is sedated and intubated.   HENT:   Nose: Nose normal.   Eyes: No scleral icterus.   Neck: Neck supple. No JVD present.   Cardiovascular: Normal rate and regular rhythm.   No murmur heard.  Pulmonary/Chest: No accessory muscle usage. He is intubated. No respiratory distress. He has decreased breath sounds. He has no wheezes. He has no rhonchi.   Abdominal: Soft. He exhibits no distension. There is no tenderness. There is no guarding.   Musculoskeletal: He exhibits edema. He exhibits no deformity.   Lymphadenopathy:     He has no cervical adenopathy.   Neurological: He is  intubated and sedated however he does follow commands  Skin: Skin is dry. No rash noted. He is not diaphoretic.   Psychiatric: His mood appears not anxious. He is not agitated        Results from last 7 days   Lab Units 03/14/19  0253 03/13/19  0357 03/12/19  0335   WBC 10*3/mm3 5.38 6.63 5.94   HEMOGLOBIN g/dL 8.8* 9.5* 9.4*   PLATELETS 10*3/mm3 229 254 225     Results from last 7 days   Lab Units 03/14/19  0253 03/13/19  0357 03/12/19  0335   SODIUM mmol/L 141 145 140   POTASSIUM mmol/L 4.9 5.1 5.9*   BUN mg/dL 45* 64* 66*   CREATININE mg/dL 3.26* 4.46* 4.51*     Results from last 7 days   Lab Units 03/14/19  0300 03/13/19  0224 03/12/19  0236   PH, ARTERIAL pH units 7.366  7.423 7.407   PCO2, ARTERIAL mm Hg 38.6 35.8 34.3*   PO2 ART mm Hg 96.1 76.2* 64.8*   FIO2 % 60 35 35     Blood Culture   Date Value Ref Range Status   03/10/2019 No growth at 24 hours  Preliminary   03/10/2019 No growth at 24 hours  Preliminary     Recent films:  Xr Chest 1 View    Result Date: 3/14/2019  1. Large bilateral pleural effusions. There is no improvement from March 13, 2019.   This report was finalized on 03/14/2019 07:15 by Dr. Natalio Uriostegui MD.    Xr Chest 1 View    Result Date: 3/13/2019  1. Stable appearance of the lungs with probable bilateral effusions and extensive volume loss in the lung bases. 2. Slight retraction of the endotracheal tube. This report was finalized on 03/13/2019 07:39 by Dr. Kenny Weathers MD.    Films reviewed personally by me.  My interpretation: Endotracheal tube in position, bilateral moderate to large pleural effusions persist with bilateral interstitial infiltrates    Pulmonary Assessment:  1. Acute respiratory failure requiring mechanical ventilation secondary to bilateral lung infiltrates  2. Poorly controlled diabetes  3. Acute on chronic renal failure  4. Suspected congestive heart failure or fluid overload related to acute on chronic renal failure, with elevated BNP  5. Bilateral pleural effusions likely related to #1  6. Possible incompletely controlled pneumonia or new pneumonia or recent pneumonia with resolution but persistent opacities which have not had time to clear  7. History of vitamin D deficiency  8. Hyperkalemia likely related to renal failure  9. Metabolic acidosis    Recommend:   · No adjustments made to the ventilator.  Will assess for readiness for weaning efforts after dialysis.  He still has bilateral large pleural effusions that are present on x-ray this morning  · Acetone level positive.  Presently receiving minimal if any insulin as it is set up on a sliding scale system  · Stress ulcer and DVT prophylaxis  · Continue broad-spectrum antibiotic  coverage  · Consult nutrition to start TF. Recommend 10ml/hr goal initially  · Prognosis guarded    Electronically signed by MASOUD Stoner on 3/14/2019 at 9:13 AM     Physician substantive portion:  He remains sedated on the ventilator.  He is on dialysis now.  Discussed with nursing and Dr. Sharma.  Exam shows him to have diminished breath sounds.  Chest x-ray my personal interpretation is bilateral pleural effusions, endotracheal tube in good position.  Hope to make an aggressive move for spontaneous breathing trial tomorrow but needs more dialysis before and    I have seen and examined patient personally, performing a face-to-face diagnostic evaluation with plan of care reviewed and developed with APRN and nursing staff. I have addended and/or modified the above history of present illness, physical examination, and assessment and plan to reflect my findings and impressions. Essential elements of the care plan were discussed with APRN above.  Agree with findings and assessment/plan as documented above.    Electronically signed by Sascha Chiang MD, on 3/14/2019, 10:01 AM

## 2019-03-14 NOTE — PLAN OF CARE
Problem: Patient Care Overview  Goal: Plan of Care Review  Outcome: Ongoing (interventions implemented as appropriate)   03/14/19 1115   OTHER   Outcome Summary Pt on mechanical vent. RD consult for enteral ntn. Start Peptamen AF at 10 ml/hr do not advance at this time. Auto flush of 25 ml/hr per pump. Con to follow.

## 2019-03-14 NOTE — PLAN OF CARE
Problem: Patient Care Overview  Goal: Plan of Care Review  Outcome: Ongoing (interventions implemented as appropriate)   03/14/19 0335   OTHER   Outcome Summary Patient remains on vent in CCU. Diprivan infusing at 50 mcg. Morphine and ativan administered x1. BP elevated and labetalol adminstered x1. Patient moves all extremities and follows commands. Adequate urine output per elizabeth catheter. Bilateral wrist restraints present.    Plan of Care Review   Progress improving   Coping/Psychosocial   Plan of Care Reviewed With patient

## 2019-03-14 NOTE — PROGRESS NOTES
[facility] Medicine Services  INPATIENT PROGRESS NOTE    Patient Name: Jose Shah  Date of Admission: 3/10/2019  Today's Date: 03/14/19  Length of Stay: 4  Primary Care Physician: Dai Boston APRN    Subjective   Chief Complaint: respiratory failure  HPI   Status post permacath placement on 3/13 with his first dialysis treatment yesterday.  He is undergoing his second dialysis treatment at present.    We will plan to extubate later this morning.  He is appropriate off sedation per nursing.    Review of Systems   Unable to assess secondary to the patient's intubated and sedated state.     Objective    Temp:  [97.2 °F (36.2 °C)-97.9 °F (36.6 °C)] 97.9 °F (36.6 °C)  Heart Rate:  [74-91] 80  Resp:  [16-23] 19  BP: (109-166)/() 150/93  FiO2 (%):  [35 %-60 %] 35 %  Physical Exam  Constitutional: He appears well-developed and well-nourished.   No distress on ventilatory support.  No family present.  Seen and discussed with his nurse, Dominique. Discussed with his father this morning as well.   Head: Normocephalic and atraumatic.   Eyes: Conjunctivae are normal. Pupils are equal, round, and reactive to light.   Neck: Neck supple. No JVD present.   Cardiovascular: Normal rate, regular rhythm, normal heart sounds and intact distal pulses. Exam reveals no gallop and no friction rub. No murmur heard.  Pulmonary/Chest: He has rales. Ventilated, diminished at the bases with rales.   Abdominal: Soft. Bowel sounds are normal. He exhibits no distension. There is no tenderness. There is no rebound and no guarding.   Musculoskeletal: Normal range of motion. He exhibits edema. He exhibits no tenderness or deformity.  Right IJ permacath.  Neurological: He displays normal reflexes. He exhibits normal muscle tone.   Sedate with propofol.  Withdraws to painful stimuli equally in all extremities.   Skin: Skin is warm and dry. No rash noted. He has an excoriated ulcer on the left foot on the  dorsum involving the fourth toe predominantly.  This does not appear to be infected.     Intake/Output Summary (Last 24 hours) at 3/14/2019 0905  Last data filed at 3/14/2019 0355  Gross per 24 hour   Intake 850.2 ml   Output 3300 ml   Net -2449.8 ml     Results Review:  I have reviewed the labs, radiology results, and diagnostic studies.    Laboratory Data:   Results from last 7 days   Lab Units 03/14/19 0253 03/13/19 0357 03/12/19 0335   WBC 10*3/mm3 5.38 6.63 5.94   HEMOGLOBIN g/dL 8.8* 9.5* 9.4*   HEMATOCRIT % 27.8* 29.6* 29.2*   PLATELETS 10*3/mm3 229 254 225     Results from last 7 days   Lab Units 03/14/19 0253 03/13/19 0357 03/12/19 0335   SODIUM mmol/L 141 145 140   POTASSIUM mmol/L 4.9 5.1 5.9*   CHLORIDE mmol/L 105 112* 110   CO2 mmol/L 20.0* 22.0* 21.0*   BUN mg/dL 45* 64* 66*   CREATININE mg/dL 3.26* 4.46* 4.51*   CALCIUM mg/dL 8.3* 8.5 8.5   BILIRUBIN mg/dL 0.4 0.4 0.5   ALK PHOS U/L 76 82 84   ALT (SGPT) U/L 17 27 28   AST (SGOT) U/L 20 20 18   GLUCOSE mg/dL 178* 138* 212*     Culture Data:   Blood Culture   Date Value Ref Range Status   03/10/2019 No growth at 4 days  Preliminary   03/10/2019 No growth at 4 days  Preliminary     Respiratory Culture   Date Value Ref Range Status   03/10/2019 Light growth (2+) Normal Respiratory Althea  Final     Radiology Data:   Imaging Results (last 24 hours)     Procedure Component Value Units Date/Time    XR Chest 1 View [[id number]] Collected:  03/14/19 0714     Updated:  03/14/19 0718    Narrative:       EXAMINATION:   XR CHEST 1 VW-  3/14/2019 7:14 AM CDT     HISTORY: Respiratory failure     Frontal upright radiograph of the chest 3/14/2019 4:09 AM CDT     COMPARISON: March 13, 2019.     FINDINGS:   Moderate to large bilateral pleural effusions are present. The lung  bases are obscured by the large pleural effusions.. Cardiac silhouettes  enlarged. Right internal jugular double lumen catheter satisfactorily  positioned. Endotracheal tube is noted..      The  osseous structures and surrounding soft tissues demonstrate no acute  abnormality.       Impression:       1. Large bilateral pleural effusions. There is no improvement from March 13, 2019.        This report was finalized on 03/14/2019 07:15 by Dr. Natalio Uriostegui MD.     I have reviewed the patient's current medications.     Assessment/Plan     Active Hospital Problems    Diagnosis   • **Acute respiratory failure with hypoxia and hypercapnia (CMS/HCC)   • History of recent pneumonia   • Acute systolic congestive heart failure (CMS/HCC)   • Diabetic ulcer of toe of left foot associated with type 1 diabetes mellitus, limited to breakdown of skin (CMS/HCC)   • Acute kidney injury (CMS/HCC)   • Hyperkalemia   • Anemia   • Chronic kidney disease   • Type 1 diabetes, uncontrolled, with gastroparesis (CMS/HCC)   • Diabetic polyneuropathy associated with type 1 diabetes mellitus (CMS/[facility])   • Tobacco abuse disorder     Plan:  He was originally admitted on 3/10 by Dr. Castillo.  He presented with acute hypoxic and hypercarbic respiratory failure.  History was given that he had recently been discharged from [facility] in [place], [place]. He was treated there for pneumonia per report.  He required intubation.  He remains mechanically ventilated.     Pulmonary has been consulted to assist with his mechanical ventilation.  Continue inhaled bronchodilators.  He was being covered broadly with vancomycin, doxycycline, and meropenem.  I stopped doxycycline on 3/11.  Vancomycin stopped on 3/13.  Plan to stop meropenem tomorrow.  He received 1 dose of levofloxacin in the ER on 3/10.  Urinary antigens are negative and sputum/blood cultures show no growth thus far.     Nephrology has been consulted.  He was found to be in acute renal failure with hyperkalemia.  He persistent renal failure, but his potassium level is normal today.  He is going to be taken for a permacath by Dr. Avila and initiated hemodialysis  on 3/13.      Echocardiogram was obtained and shows an ejection fraction of 46-50%.  He will eventually need to have this more formally addressed.  He also has some hypokinetic segments on echo, which would raise concern for possible underlying coronary disease even at a young age due to his underlying uncontrolled type 1 diabetes.     Monitor hemoglobin; improved.  No signs of bleeding.  Anemia substrates that were checked in February 2019 were normal.     He has a chronic ulcer of the left fourth toe that does not appear to be infected at this point in time.  He does see wound care as an outpatient for this.  We will need to address this more formally at some point as well.     He is a long-standing type I diabetic.  His current hemoglobin A1c is 5.3.  Continue Accu-Cheks and sliding scale insulin for now.    If he does not extubate today, will start tube feeding.  However, I am confident that he will extubate today.     Lovenox for DVT prophylaxis.  Pepcid for peptic ulcer prophylaxis while on ventilatory support.    Darius Sharma,    03/14/19   9:04 AM

## 2019-03-14 NOTE — PROGRESS NOTES
Nephrology ([facility]) Progress Note      Patient:  Jose Shah  YOB: 1985  Date of Service: 3/14/2019  MRN: [id number]   Acct: [id number]   Primary Care Physician: Dai Boston APRN  Advance Directive:   Code Status and Medical Interventions:   Ordered at: 03/10/19 0743     Code Status:    CPR     Medical Interventions (Level of Support Prior to Arrest):    Full     Admit Date: 3/10/2019       Hospital Day: 4  Referring Provider: No ref. provider found      Patient personally seen and examined.  Complete chart including Consults, Notes, Operative Reports, Labs, Cardiology, and Radiology studies reviewed as able.        Subjective:  Jose Shah is a 33 y.o. male  whom we were consulted for acute kidney injury and hyperkalemia.  Baseline chronic kidney disease stage 4; follows with Dr Yan in [place], [place].  History of type I diabetes with noncompliance in the past, Lopez's esophagus, hypertension, COPD, tobacco abuse.  Recent piror admission at [facility] in [place] for pneumonia. Discharged from their facility on 3/09; and presented to [facility] emergency department on 3/10.  Presented with increased dyspnea, altered mental status. Required intubation and mechanical ventilation shortly after arrival.  Hospital course remarkable for good response to IV diuretics but has persistently elevated potassium level. Renal function without any improvement. On 3/13 had permcath placed and first dialysis treatment; tolerated well    Today remains intubated. No new overnight issues.  Urine output nonoliguric.  Seen during HD.  Dialysis   Pt was seen on RRT; tolerating well  Modality: Hemodialysis  Access: Catheter  Location: right upper  QB: 350  QD: 600  UF: 3000    Allergies:  Penicillins    Home Meds:  Medications Prior to Admission   Medication Sig Dispense Refill Last Dose   • CloNIDine (CATAPRES) 0.1 MG tablet Take 0.1 mg by mouth Every 8 (Eight)  Hours.      • famotidine (PEPCID) 20 MG tablet Take 20 mg by mouth 2 (Two) Times a Day.      • hydrALAZINE (APRESOLINE) 25 MG tablet Take 25 mg by mouth Every 6 (Six) Hours As Needed.      • metoprolol tartrate (LOPRESSOR) 50 MG tablet Take 50 mg by mouth 2 (Two) Times a Day.      • Alcohol Swabs (ALCOHOL WIPES) 70 % pads Use 4 x daily 120 each 11 Taking   • amLODIPine (NORVASC) 5 MG tablet Take 5 mg by mouth Daily.   2/28/2019 at Unknown time   • atorvastatin (LIPITOR) 80 MG tablet Take 80 mg by mouth Daily.   2/28/2019 at Unknown time   • Blood Glucose Monitoring Suppl w/Device kit USE AS INDICATED, ANY MONITOR 1 each 1 Taking   • Glucose Blood (BLOOD GLUCOSE TEST) strip Use 4 x daily, use any brand covered by insurance or same brand as before 120 each 11 Taking   • insulin aspart (NOVOLOG) 100 UNIT/ML injection USE AS DIRECTED UP  UNITS DAILY 5 each 5 3/1/2019 at Unknown time   • Lancet Devices (LANCING DEVICE) misc USE AS INDICATED TO CORRELATE WITH STRIPS AND METER 1 each 1 Taking   • Lancets 30G misc USE 4 X DAILY 120 each 11 Taking   • metoclopramide (REGLAN) 10 MG tablet Take 10 mg by mouth 3 (Three) Times a Day Before Meals.   2/28/2019 at Unknown time   • pantoprazole (PROTONIX) 40 MG EC tablet Take 40 mg by mouth 2 (Two) Times a Day.   2/28/2019 at Unknown time   • Urine Glucose-Ketones Test strip 1 each by In Vitro route As Needed (elevated glucose). 100 each 11 Taking       Medicines:  Current Facility-Administered Medications   Medication Dose Route Frequency Provider Last Rate Last Dose   • chlorhexidine (PERIDEX) 0.12 % solution 15 mL  15 mL Mouth/Throat Q12H Cisco Castillo MD   15 mL at 03/13/19 2051   • dextrose (D50W) 25 g/ 50mL Intravenous Solution 25 g  25 g Intravenous Q15 Min PRN Cisco Castillo MD       • dextrose (GLUTOSE) oral gel 15 g  15 g Oral Q15 Min PRN Cisco Castillo MD       • enoxaparin (LOVENOX) syringe 30 mg  30 mg Subcutaneous Q24H Cisco Castillo  MD   30 mg at 03/13/19 0904   • famotidine (PEPCID) injection 20 mg  20 mg Intravenous Daily Cisco Castillo MD   20 mg at 03/13/19 0904   • glucagon (human recombinant) (GLUCAGEN DIAGNOSTIC) injection 1 mg  1 mg Subcutaneous PRN Cisco Castillo MD       • heparin (porcine) injection 3,600 Units  3,600 Units Intracatheter Once Ali, MD Marcelo       • heparin (porcine) injection 3,600 Units  3,600 Units Intracatheter Once Ali, MD Marcelo       • insulin lispro (humaLOG) injection 2-9 Units  2-9 Units Subcutaneous Q6H Darius Sharma DO   2 Units at 03/14/19 0522   • ipratropium-albuterol (DUO-NEB) nebulizer solution 3 mL  3 mL Nebulization 4x Daily - RT Cisco Castillo MD   3 mL at 03/14/19 0725   • iron sucrose (VENOFER) 200 mg in sodium chloride 0.9 % 100 mL IVPB  200 mg Intravenous Q24H Marcelo Snyder MD   200 mg at 03/13/19 1559   • labetalol (NORMODYNE,TRANDATE) injection 10 mg  10 mg Intravenous Q4H PRN Cisco Castillo MD   10 mg at 03/14/19 0459   • LORazepam (ATIVAN) injection 1 mg  1 mg Intravenous Q4H PRN Cisco Castillo MD   1 mg at 03/14/19 0740   • midazolam (VERSED) injection 1 mg  1 mg Intravenous Q4H PRN Claudio Paulson DO   1 mg at 03/10/19 2257   • morphine injection 4 mg  4 mg Intravenous Q3H PRN Cisco Castillo MD   4 mg at 03/13/19 2055   • Morphine sulfate (PF) injection 2 mg  2 mg Intravenous Q4H PRN Manjit Avila DO        And   • naloxone (NARCAN) injection 0.4 mg  0.4 mg Intravenous Q5 Min PRN Manjit Avila DO       • ondansetron (ZOFRAN) injection 4 mg  4 mg Intravenous Q6H PRN Cisco Castillo MD       • propofol (DIPRIVAN) infusion 10 mg/mL 100 mL  5-50 mcg/kg/min Intravenous Titrated Ger Chino MD 24.7 mL/hr at 03/14/19 0523 50 mcg/kg/min at 03/14/19 0523   • sodium chloride 0.9 % flush 10 mL  10 mL Intravenous Ger Silva MD       • sodium chloride 0.9 % flush 3 mL  3 mL Intravenous Q12H Jonathan  Cisco ROCHA MD   3 mL at 03/13/19 2051   • sodium chloride 0.9 % flush 3-10 mL  3-10 mL Intravenous PRN Cisco Castillo MD           Past Medical History:  Past Medical History:   Diagnosis Date   • Bleeding from the nose    • Chronic kidney disease    • Diabetic polyneuropathy associated with type 1 diabetes mellitus (CMS/[facility]) 12/18/2017   • GERD (gastroesophageal reflux disease)    • Hypertension    • Tobacco abuse disorder 12/18/2017   • Type 1 diabetes mellitus with severe nonproliferative retinopathy of both eyes (CMS/[facility]) 12/18/2017       Past Surgical History:  Past Surgical History:   Procedure Laterality Date   • EYE SURGERY Right    • HERNIA REPAIR     • INSERTION HEMODIALYSIS CATHETER Right 3/13/2019    Procedure: HEMODIALYSIS CATHETER INSERTION;  Surgeon: Manjit Avila DO;  Location: [place] [address];  Service: Vascular   • OTHER SURGICAL HISTORY      insulin pump insertion   • SKIN GRAFT         Family History  Family History   Problem Relation Age of Onset   • Hypertension Mother        Social History  Social History     Socioeconomic History   • Marital status: Single     Spouse name: Not on file   • Number of children: Not on file   • Years of education: Not on file   • Highest education level: Not on file   Social Needs   • Financial resource strain: Not on file   • Food insecurity - worry: Not on file   • Food insecurity - inability: Not on file   • Transportation needs - medical: Not on file   • Transportation needs - non-medical: Not on file   Occupational History   • Not on file   Tobacco Use   • Smoking status: Current Every Day Smoker   • Smokeless tobacco: Never Used   Substance and Sexual Activity   • Alcohol use: Not on file   • Drug use: Defer   • Sexual activity: Defer   Other Topics Concern   • Not on file   Social History Narrative   • Not on file         Review of Systems:  Unable to obtain due to intubated and sedated    Objective:  Patient Vitals for the past 24 hrs:    BP Temp Temp src Pulse Resp SpO2 Weight   03/14/19 0732 -- -- -- -- 19 -- --   03/14/19 0725 -- -- -- 80 16 95 % --   03/14/19 0630 150/93 -- -- 80 -- 99 % --   03/14/19 0620 148/94 -- -- 76 -- 97 % --   03/14/19 0605 149/94 -- -- 75 -- 100 % --   03/14/19 0550 150/97 -- -- 74 -- 100 % --   03/14/19 0535 149/94 -- -- 75 -- 100 % --   03/14/19 0520 151/96 -- -- 77 -- 100 % --   03/14/19 0500 (!) 163/102 -- -- 85 -- 100 % --   03/14/19 0459 (!) 160/106 -- -- 86 -- -- --   03/14/19 0445 (!) 160/106 -- -- 79 -- 100 % --   03/14/19 0440 -- -- -- -- -- -- 90.3 kg (199 lb 1.2 oz)   03/14/19 0430 (!) 162/102 -- -- 79 -- 100 % --   03/14/19 0420 (!) 166/104 -- -- 81 -- 100 % --   03/14/19 0405 (!) 163/106 97.9 °F (36.6 °C) Axillary 77 -- 100 % --   03/14/19 0350 (!) 166/103 -- -- 79 -- 100 % --   03/14/19 0335 (!) 160/105 -- -- 77 -- 100 % --   03/14/19 0320 159/99 -- -- 77 -- 100 % --   03/14/19 0300 154/98 -- -- 77 -- 99 % --   03/14/19 0250 142/93 -- -- 74 -- 97 % --   03/14/19 0245 142/93 -- -- 74 -- 99 % --   03/14/19 0235 145/95 -- -- 75 -- 99 % --   03/14/19 0220 123/79 -- -- 76 -- 95 % --   03/14/19 0205 124/78 -- -- 79 -- 96 % --   03/14/19 0150 124/78 -- -- 75 -- 95 % --   03/14/19 0135 136/84 -- -- 76 -- 96 % --   03/14/19 0115 134/83 -- -- 76 -- 96 % --   03/14/19 0100 139/88 -- -- 77 -- 94 % --   03/14/19 0050 125/73 -- -- 75 -- 94 % --   03/14/19 0035 129/78 -- -- 76 -- 95 % --   03/14/19 0020 138/85 -- -- 77 -- 97 % --   03/14/19 0005 138/79 97.2 °F (36.2 °C) Axillary 78 -- 97 % --   03/13/19 2350 132/80 -- -- 78 -- 98 % --   03/13/19 2335 138/84 -- -- 80 -- 99 % --   03/13/19 2315 130/78 -- -- 79 -- 99 % --   03/13/19 2305 139/84 -- -- 79 -- 100 % --   03/13/19 2300 139/84 -- -- 81 -- 99 % --   03/13/19 2250 127/76 -- -- 77 -- 98 % --   03/13/19 2235 131/85 -- -- 78 -- 99 % --   03/13/19 2220 130/81 -- -- 77 -- 96 % --   03/13/19 2215 -- -- -- -- 16 -- --   03/13/19 2209 -- -- -- -- 16 -- --   03/13/19 2205  126/78 -- -- 76 -- 97 % --   03/13/19 2150 128/82 -- -- 76 -- 96 % --   03/13/19 2130 130/79 -- -- 75 -- 96 % --   03/13/19 2115 138/86 -- -- 77 -- 97 % --   03/13/19 2105 142/87 -- -- 78 -- 97 % --   03/13/19 2050 148/99 -- -- 86 -- 97 % --   03/13/19 2035 155/92 -- -- 84 -- 98 % --   03/13/19 2020 147/93 -- -- 84 -- 98 % --   03/13/19 2005 156/98 97.9 °F (36.6 °C) Axillary 84 -- 99 % --   03/13/19 1945 155/94 -- -- 83 -- 99 % --   03/13/19 1930 155/92 -- -- 83 -- 100 % --   03/13/19 1920 159/96 -- -- 87 -- 100 % --   03/13/19 1905 149/95 -- -- 87 -- 100 % --   03/13/19 1802 143/90 -- -- 85 23 100 % --   03/13/19 1702 119/79 -- -- 81 17 100 % --   03/13/19 1602 109/72 97.3 °F (36.3 °C) Axillary 84 16 96 % --   03/13/19 1502 141/90 -- -- 88 17 91 % --   03/13/19 1302 125/76 -- -- 87 16 94 % --   03/13/19 1202 155/93 97.5 °F (36.4 °C) Axillary 91 17 97 % --   03/13/19 1102 149/90 -- -- 87 21 97 % --   03/13/19 1026 -- -- -- 89 16 100 % --   03/13/19 1019 -- -- -- 88 16 98 % --   03/13/19 1002 134/80 -- -- 82 16 96 % --   03/13/19 0902 135/78 -- -- 84 16 96 % --       Intake/Output Summary (Last 24 hours) at 3/14/2019 0855  Last data filed at 3/14/2019 0355  Gross per 24 hour   Intake 850.2 ml   Output 3300 ml   Net -2449.8 ml     General: intubated   Neck: supple, no JVD  Chest:  clear to auscultation bilaterally without respiratory distress  CVS: regular rate and rhythm  Abdominal: soft, nontender, positive bowel sounds  Extremities: lower extremity 1+ edema  Skin: warm and dry without rash  Neuro: no focal motor deficits    Labs:  Results from last 7 days   Lab Units 03/14/19  0253 03/13/19  0357 03/12/19  0335   WBC 10*3/mm3 5.38 6.63 5.94   HEMOGLOBIN g/dL 8.8* 9.5* 9.4*   HEMATOCRIT % 27.8* 29.6* 29.2*   PLATELETS 10*3/mm3 229 254 225         Results from last 7 days   Lab Units 03/14/19  0253 03/13/19  0357 03/12/19  0335   SODIUM mmol/L 141 145 140   POTASSIUM mmol/L 4.9 5.1 5.9*   CHLORIDE mmol/L 105 112* 110    CO2 mmol/L 20.0* 22.0* 21.0*   BUN mg/dL 45* 64* 66*   CREATININE mg/dL 3.26* 4.46* 4.51*   CALCIUM mg/dL 8.3* 8.5 8.5   BILIRUBIN mg/dL 0.4 0.4 0.5   ALK PHOS U/L 76 82 84   ALT (SGPT) U/L 17 27 28   AST (SGOT) U/L 20 20 18   GLUCOSE mg/dL 178* 138* 212*       Radiology:   Imaging Results (last 72 hours)     Procedure Component Value Units Date/Time    XR Chest 1 View [[id number]] Collected:  03/12/19 0731     Updated:  03/12/19 0736    Narrative:       EXAMINATION: XR CHEST 1 VW-     3/12/2019 3:40 AM CDT     HISTORY: Intubated Patient; J96.01-Acute respiratory failure with  hypoxia; J96.02-Acute respiratory failure with hypercapnia;  N18.9-Chronic kidney disease, unspecified; E87.5-Hyperkalemia;  E87.70-Fluid overload, unspecified; D64.9-Anemia, unspecified.     One view chest x-ray compared with yesterday.     Endotracheal tube remains in good position. The tip is approximately 5  cm above the gabrielle.  A nasogastric tube has been placed and is in good position.     Heart size is unchanged.     Bibasilar consolidation is the same or slightly worse. No improvement.     No pneumothorax.     Summary:  1. No significant change.  This report was finalized on 03/12/2019 07:33 by Dr. Papito Oglesby MD.    US Renal Bilateral [[id number]] Collected:  03/11/19 1515     Updated:  03/11/19 1519    Narrative:       EXAMINATION: US RENAL BILATERAL-     3/11/2019 1:40 PM CDT     HISTORY: CAMILO; J96.01-Acute respiratory failure with hypoxia;  J96.02-Acute respiratory failure with hypercapnia; N18.9-Chronic kidney  disease, unspecified; E87.5-Hyperkalemia; E87.70-Fluid overload,  unspecified; D64.9-Anemia, unspecified.     Detail is limited. There is no hydronephrosis.  Cortical thickness and cortical echogenicity is appropriate.     Right kidney = 1 39 x 51 x 68 mm.  Left kidney = 1 38 x 62 x 63 mm.     Pleural fluid is incidentally noted.     Summary:  1. Symmetric kidneys with no obstruction.  This report was finalized on  03/11/2019 15:16 by Dr. Papito Oglesby MD.    XR Abdomen KUB [[id number]] Collected:  03/11/19 1101     Updated:  03/11/19 1105    Narrative:       EXAMINATION: XR ABDOMEN KUB-     3/11/2019 10:17 AM CDT     HISTORY: ngt placement; J96.01-Acute respiratory failure with hypoxia;  J96.02-Acute respiratory failure with hypercapnia; N18.9-Chronic kidney  disease, unspecified; E87.5-Hyperkalemia; E87.70-Fluid overload,  unspecified; D64.9-Anemia, unspecified.     Portable radiograph of the upper abdomen and lower chest shows a well  positioned nasogastric tube extending to or beyond the mid stomach.     Bibasilar infiltrate noted.  Normal bones and normal visualized bowel gas pattern.     Summary:  1. Well-positioned nasogastric tube.  This report was finalized on 03/11/2019 11:02 by Dr. Papito Oglesby MD.    XR Chest 1 View [[id number]] Collected:  03/11/19 0657     Updated:  03/11/19 0701    Narrative:       EXAMINATION:   XR CHEST 1 VW-  3/11/2019 6:57 AM CDT     HISTORY: Patient intubated     Frontal upright radiograph of the chest 3/11/2019 3:19 AM CDT     COMPARISON: March 10, 2019.     FINDINGS:   Again bilateral interstitial air space filling infiltrates are present.  Says appearance of pulmonary edema. This is not significantly changed  compared to prior study March 10. Increased density in the right lung  base is noted in the left lung base. This may be posterior pleural  effusions. The cardiac silhouette is normal. Infiltrate tube is  unchanged in position..      The osseous structures and surrounding soft tissues demonstrate no acute  abnormality.       Impression:       1. Persistent bilateral interstitial air space filling infiltrates. Says  appearance pulmonary edema. Bilateral pleural effusions are noted. There  is no significant improvement from March 10.        This report was finalized on 03/11/2019 06:58 by Dr. Natalio Uriostegui MD.    XR Chest 1 View [[id number]] Collected:  03/10/19 0952     Updated:   03/10/19 0956    Narrative:       HISTORY: Dyspnea     CXR: A frontal view the chest is obtained.     COMPARISON: 12/2/2012     FINDINGS: Endotracheal tube is appropriately positioned with the tip at  the T4 level. There are diffuse bilateral pulmonary opacities. Mixed  interstitial alveolar. There are small pleural effusions. Cardiac  silhouette is obscured by the pulmonary opacities. There is no  pneumothorax. There is no acute bony pathology.       Impression:       1. Diffuse mixed interstitial alveolar opacities with small pleural  effusions. Findings may be due to edema and/or pneumonia. Appropriate  positioning of the endotracheal tube.  This report was finalized on 03/10/2019 09:53 by Dr. Teri Schwartz MD.    CT Chest Without Contrast [[id number]] Collected:  03/10/19 0758     Updated:  03/10/19 0805    Narrative:       CT CHEST WO CONTRAST- 3/10/2019 7:35 AM CDT     HISTORY: respiratory distress, eval for pna vs edema      COMPARISON: None     DOSE LENGTH PRODUCT: 477 mGy cm. Automated exposure control was also  utilized to decrease patient radiation dose.     TECHNIQUE: Axial images the chest are obtained without IV contrast     FINDINGS:  The endotracheal tube is appropriate in position with the tip  above the gabrielle. Reactive mediastinal lymph nodes measure up to 10 mm.  The thoracic aorta appears normal in caliber. The heart is borderline  prominent in size. There are small pericardial effusion is identified.  There is a small to moderate right and a small left pleural effusion.     Limited images the upper abdomen demonstrate no adrenal nodules.     There is smooth interlobular septal thickening within the aerated upper  lobes. There are scattered patchy groundglass opacities seen throughout  the lung parenchyma with dense consolidation of the lower lobes. No  discrete endobronchial lesions are identified. There is no pneumothorax.     No focal destructive bony lesions are visualized.        Impression:       1. Bilateral diffuse patchy opacities are suggestive for multifocal  pneumonia. There is also interlobular septal thickening suggesting  underlying edema. There is a small to moderate right and small left  pleural effusion with a very small pericardial effusion. Heart is  borderline enlarged. Thoracic aorta normal in caliber.  2. Appropriate positioning of the endotracheal tube.  This report was finalized on 03/10/2019 08:02 by Dr. Teri Schwartz MD.          Culture:  Blood Culture   Date Value Ref Range Status   03/10/2019 No growth at 2 days  Preliminary   03/10/2019 No growth at 2 days  Preliminary     Respiratory Culture   Date Value Ref Range Status   03/10/2019 Light growth (2+) Normal Respiratory [place]  Preliminary         Assessment   1.  Acute kidney injury; due to ATN--now on dialysis  2.  Baseline chronic kidney disease stage 4  3.  Hyperkalemia--improved  4.  Acute hypoxic respiratory failure  5.  Metabolic acidosis  6.  Anemia of chronic kidney disease (s/p 2 units PRBC last week at [facility])  7.  Type 1 diabetes with nephropathy  8.  Essential hypertension  9.  Clinically volume overloaded    Plan:  1.  Dialysis today; up to 3000 ml UF as tolerated  2.  Maintaining good urine output; will give Lasix 40 mg IV this afternoon to help with remaining volume overload  3.  Monitor labs    Aleksandar Conteh, APRMARCY  3/14/2019  8:55 AM

## 2019-03-15 LAB
ALBUMIN SERPL-MCNC: 2.5 G/DL (ref 3.5–5)
ALBUMIN/GLOB SERPL: 1 G/DL (ref 1.1–2.5)
ALP SERPL-CCNC: 85 U/L (ref 24–120)
ALT SERPL W P-5'-P-CCNC: 23 U/L (ref 0–54)
ANION GAP SERPL CALCULATED.3IONS-SCNC: 13 MMOL/L (ref 4–13)
AST SERPL-CCNC: 25 U/L (ref 7–45)
BACTERIA SPEC AEROBE CULT: NORMAL
BACTERIA SPEC AEROBE CULT: NORMAL
BASOPHILS # BLD AUTO: 0.07 10*3/MM3 (ref 0–0.2)
BASOPHILS NFR BLD AUTO: 0.8 % (ref 0–2)
BILIRUB SERPL-MCNC: 0.6 MG/DL (ref 0.1–1)
BUN BLD-MCNC: 35 MG/DL (ref 5–21)
BUN/CREAT SERPL: 11.5 (ref 7–25)
CALCIUM SPEC-SCNC: 8.3 MG/DL (ref 8.4–10.4)
CHLORIDE SERPL-SCNC: 104 MMOL/L (ref 98–110)
CO2 SERPL-SCNC: 24 MMOL/L (ref 24–31)
CREAT BLD-MCNC: 3.04 MG/DL (ref 0.5–1.4)
DEPRECATED RDW RBC AUTO: 52.9 FL (ref 40–54)
EOSINOPHIL # BLD AUTO: 0.18 10*3/MM3 (ref 0–0.7)
EOSINOPHIL NFR BLD AUTO: 2 % (ref 0–4)
ERYTHROCYTE [DISTWIDTH] IN BLOOD BY AUTOMATED COUNT: 15.4 % (ref 12–15)
GFR SERPL CREATININE-BSD FRML MDRD: 24 ML/MIN/1.73
GLOBULIN UR ELPH-MCNC: 2.5 GM/DL
GLUCOSE BLD-MCNC: 168 MG/DL (ref 70–100)
GLUCOSE BLDC GLUCOMTR-MCNC: 148 MG/DL (ref 70–130)
GLUCOSE BLDC GLUCOMTR-MCNC: 176 MG/DL (ref 70–130)
HCT VFR BLD AUTO: 31.7 % (ref 40–52)
HGB BLD-MCNC: 10.3 G/DL (ref 14–18)
IMM GRANULOCYTES # BLD AUTO: 0.02 10*3/MM3 (ref 0–0.05)
IMM GRANULOCYTES NFR BLD AUTO: 0.2 % (ref 0–5)
LYMPHOCYTES # BLD AUTO: 1.15 10*3/MM3 (ref 0.72–4.86)
LYMPHOCYTES NFR BLD AUTO: 12.7 % (ref 15–45)
MCH RBC QN AUTO: 30.3 PG (ref 28–32)
MCHC RBC AUTO-ENTMCNC: 32.5 G/DL (ref 33–36)
MCV RBC AUTO: 93.2 FL (ref 82–95)
MONOCYTES # BLD AUTO: 0.63 10*3/MM3 (ref 0.19–1.3)
MONOCYTES NFR BLD AUTO: 7 % (ref 4–12)
NEUTROPHILS # BLD AUTO: 6.97 10*3/MM3 (ref 1.87–8.4)
NEUTROPHILS NFR BLD AUTO: 77.3 % (ref 39–78)
NRBC BLD AUTO-RTO: 0 /100 WBC (ref 0–0)
PLATELET # BLD AUTO: 232 10*3/MM3 (ref 130–400)
PMV BLD AUTO: 8.9 FL (ref 6–12)
POTASSIUM BLD-SCNC: 4.5 MMOL/L (ref 3.5–5.3)
PROT SERPL-MCNC: 5 G/DL (ref 6.3–8.7)
RBC # BLD AUTO: 3.4 10*6/MM3 (ref 4.8–5.9)
SODIUM BLD-SCNC: 141 MMOL/L (ref 135–145)
WBC NRBC COR # BLD: 9.02 10*3/MM3 (ref 4.8–10.8)

## 2019-03-15 PROCEDURE — 25010000002 IRON SUCROSE PER 1 MG: Performed by: INTERNAL MEDICINE

## 2019-03-15 PROCEDURE — 25010000002 MORPHINE PER 10 MG: Performed by: SURGERY

## 2019-03-15 PROCEDURE — 82962 GLUCOSE BLOOD TEST: CPT

## 2019-03-15 PROCEDURE — 99232 SBSQ HOSP IP/OBS MODERATE 35: CPT | Performed by: INTERNAL MEDICINE

## 2019-03-15 PROCEDURE — 63710000001 INSULIN LISPRO (HUMAN) PER 5 UNITS: Performed by: INTERNAL MEDICINE

## 2019-03-15 PROCEDURE — 25010000002 HYDRALAZINE PER 20 MG: Performed by: INTERNAL MEDICINE

## 2019-03-15 PROCEDURE — 80053 COMPREHEN METABOLIC PANEL: CPT | Performed by: INTERNAL MEDICINE

## 2019-03-15 PROCEDURE — 94799 UNLISTED PULMONARY SVC/PX: CPT

## 2019-03-15 PROCEDURE — 85025 COMPLETE CBC W/AUTO DIFF WBC: CPT | Performed by: INTERNAL MEDICINE

## 2019-03-15 PROCEDURE — 25010000002 HALOPERIDOL LACTATE PER 5 MG: Performed by: INTERNAL MEDICINE

## 2019-03-15 PROCEDURE — 92610 EVALUATE SWALLOWING FUNCTION: CPT

## 2019-03-15 PROCEDURE — 25010000002 ENOXAPARIN PER 10 MG: Performed by: INTERNAL MEDICINE

## 2019-03-15 PROCEDURE — 5A1D70Z PERFORMANCE OF URINARY FILTRATION, INTERMITTENT, LESS THAN 6 HOURS PER DAY: ICD-10-PCS | Performed by: NURSE PRACTITIONER

## 2019-03-15 RX ORDER — ALBUMIN (HUMAN) 12.5 G/50ML
12.5 SOLUTION INTRAVENOUS AS NEEDED
Status: ACTIVE | OUTPATIENT
Start: 2019-03-15 | End: 2019-03-16

## 2019-03-15 RX ORDER — HEPARIN SODIUM 1000 [USP'U]/ML
4000 INJECTION, SOLUTION INTRAVENOUS; SUBCUTANEOUS ONCE
Status: DISCONTINUED | OUTPATIENT
Start: 2019-03-15 | End: 2019-03-19 | Stop reason: HOSPADM

## 2019-03-15 RX ORDER — IPRATROPIUM BROMIDE AND ALBUTEROL SULFATE 2.5; .5 MG/3ML; MG/3ML
3 SOLUTION RESPIRATORY (INHALATION) EVERY 4 HOURS PRN
Status: DISCONTINUED | OUTPATIENT
Start: 2019-03-15 | End: 2019-03-19 | Stop reason: HOSPADM

## 2019-03-15 RX ADMIN — INSULIN LISPRO 2 UNITS: 100 INJECTION, SOLUTION INTRAVENOUS; SUBCUTANEOUS at 06:41

## 2019-03-15 RX ADMIN — HYDRALAZINE HYDROCHLORIDE 20 MG: 20 INJECTION INTRAMUSCULAR; INTRAVENOUS at 00:25

## 2019-03-15 RX ADMIN — ENOXAPARIN SODIUM 30 MG: 30 INJECTION SUBCUTANEOUS at 09:01

## 2019-03-15 RX ADMIN — HYDRALAZINE HYDROCHLORIDE 20 MG: 20 INJECTION INTRAMUSCULAR; INTRAVENOUS at 23:41

## 2019-03-15 RX ADMIN — INSULIN LISPRO 2 UNITS: 100 INJECTION, SOLUTION INTRAVENOUS; SUBCUTANEOUS at 00:23

## 2019-03-15 RX ADMIN — MORPHINE SULFATE 2 MG: 2 INJECTION, SOLUTION INTRAMUSCULAR; INTRAVENOUS at 18:30

## 2019-03-15 RX ADMIN — SODIUM CHLORIDE, PRESERVATIVE FREE 3 ML: 5 INJECTION INTRAVENOUS at 22:22

## 2019-03-15 RX ADMIN — METOPROLOL TARTRATE 10 MG: 5 INJECTION, SOLUTION INTRAVENOUS at 05:38

## 2019-03-15 RX ADMIN — CHLORHEXIDINE GLUCONATE 15 ML: 1.2 RINSE ORAL at 22:13

## 2019-03-15 RX ADMIN — IRON SUCROSE 200 MG: 20 INJECTION, SOLUTION INTRAVENOUS at 14:21

## 2019-03-15 RX ADMIN — IPRATROPIUM BROMIDE AND ALBUTEROL SULFATE 3 ML: 2.5; .5 SOLUTION RESPIRATORY (INHALATION) at 08:30

## 2019-03-15 RX ADMIN — HYDRALAZINE HYDROCHLORIDE 20 MG: 20 INJECTION INTRAMUSCULAR; INTRAVENOUS at 15:37

## 2019-03-15 RX ADMIN — FAMOTIDINE 20 MG: 10 INJECTION INTRAVENOUS at 09:01

## 2019-03-15 RX ADMIN — METOPROLOL TARTRATE 10 MG: 5 INJECTION, SOLUTION INTRAVENOUS at 16:55

## 2019-03-15 RX ADMIN — HALOPERIDOL LACTATE 2 MG: 5 INJECTION, SOLUTION INTRAMUSCULAR at 01:38

## 2019-03-15 RX ADMIN — HYDRALAZINE HYDROCHLORIDE 20 MG: 20 INJECTION INTRAMUSCULAR; INTRAVENOUS at 07:39

## 2019-03-15 RX ADMIN — METOPROLOL TARTRATE 5 MG: 5 INJECTION, SOLUTION INTRAVENOUS at 22:18

## 2019-03-15 RX ADMIN — ATORVASTATIN CALCIUM 80 MG: 40 TABLET, FILM COATED ORAL at 22:15

## 2019-03-15 RX ADMIN — INSULIN LISPRO 6 UNITS: 100 INJECTION, SOLUTION INTRAVENOUS; SUBCUTANEOUS at 23:46

## 2019-03-15 RX ADMIN — METOPROLOL TARTRATE 10 MG: 5 INJECTION, SOLUTION INTRAVENOUS at 11:39

## 2019-03-15 NOTE — PLAN OF CARE
Problem: Patient Care Overview  Goal: Plan of Care Review  Outcome: Ongoing (interventions implemented as appropriate)   03/15/19 1401   OTHER   Outcome Summary Clinical bedside swallow evaluation completed. Full range of consistencies except mechanical soft solids were presented. Pt still has a hoarse/harsh vocal quality. He had up to 5 multiple swallows with the first trial of applesauce. He exhibited delayed weak throat clears 1x with nectar and 1x with thin. He had audible swallows with thin. He had adequate mastication and clearance of oral residue with the regular solid. Recommend continuing NPO except for sips/chips and meds with applesauce.    Coping/Psychosocial   Plan of Care Reviewed With patient

## 2019-03-15 NOTE — PLAN OF CARE
"Problem: Patient Care Overview  Goal: Plan of Care Review  Outcome: Ongoing (interventions implemented as appropriate)   03/14/19 1945   OTHER   Outcome Summary pt passed weaning trials and extubated @ 1606. Pt on NC @ 3L and will desat to 88-90% if off O2. Speech to see in AM for bedside swallow study to start pt on diet. Orders for home BP meds resumed d/t pt's hypertension. Pt awake, alert, oriented and cooperative throughout the day. Around 1830, patient became angry, asking to talk to his sister. I called his sister who said she would be here soon. Pt became more angry, pulling off NC and throwing pillow across room. I was able to verbally de-escalate pt initially. During bedside report, pt became angry again at another nurse which resulted in security being called to bedside. Sister attempted to talk to patient but made issue worse. Called and spoke with Dr. Lovett who gave verbal order for haldol IV prn. Patient agrees to stay and be treated at this time, and says he wants to get better, but states \"I wish I was at [facility].\" Report given to nightshift nurse. Charge nurse, security, and Dr. Lovett aware of pt's actions. Will continue to monitor pt. Bed alarm set and father at bedside.      Goal: Interprofessional Rounds/Family Conf  Outcome: Ongoing (interventions implemented as appropriate)      Problem: Skin Injury Risk (Adult)  Goal: Skin Health and Integrity  Outcome: Ongoing (interventions implemented as appropriate)      Problem: Restraint, Nonbehavioral (Nonviolent)  Goal: Rationale and Justification  Outcome: Outcome(s) achieved Date Met: 03/14/19    Goal: Nonbehavioral (Nonviolent) Restraint: Absence of Injury/Harm  Outcome: Outcome(s) achieved Date Met: 03/14/19    Goal: Nonbehavioral (Nonviolent) Restraint: Achievement of Discontinuation Criteria  Outcome: Outcome(s) achieved Date Met: 03/14/19    Goal: Nonbehavioral (Nonviolent) Restraint: Preservation of Dignity and Wellbeing  Outcome: Outcome(s) " achieved Date Met: 03/14/19      Problem: Fall Risk (Adult)  Goal: Identify Related Risk Factors and Signs and Symptoms  Outcome: Ongoing (interventions implemented as appropriate)    Goal: Absence of Fall  Outcome: Ongoing (interventions implemented as appropriate)

## 2019-03-15 NOTE — PROGRESS NOTES
Nephrology ([facility]) Progress Note      Patient:  Jose Shah  YOB: 1985  Date of Service: 3/15/2019  MRN: [id number]   Acct: [id number]   Primary Care Physician: Dai Boston APRN  Advance Directive:   Code Status and Medical Interventions:   Ordered at: 03/10/19 0743     Code Status:    CPR     Medical Interventions (Level of Support Prior to Arrest):    Full     Admit Date: 3/10/2019       Hospital Day: 5  Referring Provider: No ref. provider found      Patient personally seen and examined.  Complete chart including Consults, Notes, Operative Reports, Labs, Cardiology, and Radiology studies reviewed as able.        Subjective:  Jose Shah is a 33 y.o. male  whom we were consulted for acute kidney injury and hyperkalemia.  Baseline chronic kidney disease stage 4; follows with Dr Yan in [place], [place].  History of type I diabetes with noncompliance in the past, Lopez's esophagus, hypertension, COPD, tobacco abuse.  Recent piror admission at [facility] in [place] for pneumonia. Discharged from their facility on 3/09; and presented to [facility] emergency department on 3/10.  Presented with increased dyspnea, altered mental status. Required intubation and mechanical ventilation shortly after arrival.  Hospital course remarkable for good response to IV diuretics but has persistently elevated potassium level. Renal function without any improvement. On 3/13 had permcath placed and first dialysis treatment; tolerated well.  Extubated on 3/14.    Today awake/alert. No new overnight issues.  Urine output nonoliguric.  Seen during HD.  Dialysis   Pt was seen on RRT; tolerating well  Modality: Hemodialysis  Access: Catheter  Location: right upper  QB: 450  QD: 600  UF: 3000    Allergies:  Penicillins    Home Meds:  Medications Prior to Admission   Medication Sig Dispense Refill Last Dose   • CloNIDine (CATAPRES) 0.1 MG tablet Take 0.1 mg by mouth  Every 8 (Eight) Hours.      • famotidine (PEPCID) 20 MG tablet Take 20 mg by mouth 2 (Two) Times a Day.      • hydrALAZINE (APRESOLINE) 25 MG tablet Take 25 mg by mouth Every 6 (Six) Hours As Needed.      • metoprolol tartrate (LOPRESSOR) 50 MG tablet Take 50 mg by mouth 2 (Two) Times a Day.      • Alcohol Swabs (ALCOHOL WIPES) 70 % pads Use 4 x daily 120 each 11 Taking   • amLODIPine (NORVASC) 5 MG tablet Take 5 mg by mouth Daily.   2/28/2019 at Unknown time   • atorvastatin (LIPITOR) 80 MG tablet Take 80 mg by mouth Daily.   2/28/2019 at Unknown time   • Blood Glucose Monitoring Suppl w/Device kit USE AS INDICATED, ANY MONITOR 1 each 1 Taking   • Glucose Blood (BLOOD GLUCOSE TEST) strip Use 4 x daily, use any brand covered by insurance or same brand as before 120 each 11 Taking   • insulin aspart (NOVOLOG) 100 UNIT/ML injection USE AS DIRECTED UP  UNITS DAILY 5 each 5 3/1/2019 at Unknown time   • Lancet Devices (LANCING DEVICE) misc USE AS INDICATED TO CORRELATE WITH STRIPS AND METER 1 each 1 Taking   • Lancets 30G misc USE 4 X DAILY 120 each 11 Taking   • metoclopramide (REGLAN) 10 MG tablet Take 10 mg by mouth 3 (Three) Times a Day Before Meals.   2/28/2019 at Unknown time   • pantoprazole (PROTONIX) 40 MG EC tablet Take 40 mg by mouth 2 (Two) Times a Day.   2/28/2019 at Unknown time   • Urine Glucose-Ketones Test strip 1 each by In Vitro route As Needed (elevated glucose). 100 each 11 Taking       Medicines:  Current Facility-Administered Medications   Medication Dose Route Frequency Provider Last Rate Last Dose   • albumin human 25 % IV SOLN 12.5 g  12.5 g Intravenous PRN Marcelo Snyder MD       • amLODIPine (NORVASC) tablet 5 mg  5 mg Oral Daily Darius Sharma DO   5 mg at 03/14/19 1552   • atorvastatin (LIPITOR) tablet 80 mg  80 mg Oral Nightly Dairus Sharma DO       • chlorhexidine (PERIDEX) 0.12 % solution 15 mL  15 mL Mouth/Throat Q12H Cisco Castillo MD   15 mL at 03/14/19 0905   •  dextrose (D50W) 25 g/ 50mL Intravenous Solution 25 g  25 g Intravenous Q15 Min PRN Cisco Castillo MD       • dextrose (GLUTOSE) oral gel 15 g  15 g Oral Q15 Min PRN Cisco Castillo MD       • enoxaparin (LOVENOX) syringe 30 mg  30 mg Subcutaneous Q24H Cisco Castillo MD   30 mg at 03/15/19 0901   • famotidine (PEPCID) injection 20 mg  20 mg Intravenous Daily Cisco Castillo MD   20 mg at 03/15/19 0901   • glucagon (human recombinant) (GLUCAGEN DIAGNOSTIC) injection 1 mg  1 mg Subcutaneous PRN Cisco Castillo MD       • haloperidol lactate (HALDOL) injection 2 mg  2 mg Intravenous Q4H PRN Nolan Lovett MD   2 mg at 03/15/19 0138   • heparin (porcine) injection 3,600 Units  3,600 Units Intracatheter Once Claudio, MD Marcelo       • heparin (porcine) injection 3,600 Units  3,600 Units Intracatheter Once Caludio, MD Marcelo       • heparin (porcine) injection 4,000 Units  4,000 Units Intracatheter Once Ali, MD Marcelo       • hydrALAZINE (APRESOLINE) injection 10 mg  10 mg Intravenous Q6H PRN Darius Sharma DO       • hydrALAZINE (APRESOLINE) injection 20 mg  20 mg Intravenous Q8H Nolan Lovett MD   20 mg at 03/15/19 0739   • insulin lispro (humaLOG) injection 2-9 Units  2-9 Units Subcutaneous Q6H Darius Sharma DO   2 Units at 03/15/19 0641   • ipratropium-albuterol (DUO-NEB) nebulizer solution 3 mL  3 mL Nebulization Q4H PRN Mariia aSntiago APRN       • iron sucrose (VENOFER) 200 mg in sodium chloride 0.9 % 100 mL IVPB  200 mg Intravenous Q24H Marcelo Snyder MD   200 mg at 03/14/19 1304   • labetalol (NORMODYNE,TRANDATE) injection 10 mg  10 mg Intravenous Q4H PRN Cisco Castillo MD   10 mg at 03/14/19 1222   • LORazepam (ATIVAN) injection 1 mg  1 mg Intravenous Q4H PRN Cisco Castillo MD   1 mg at 03/14/19 1112   • metoprolol tartrate (LOPRESSOR) injection 10 mg  10 mg Intravenous Q6H Nolan Lovett MD   10 mg at 03/15/19 0537   • midazolam (VERSED)  injection 1 mg  1 mg Intravenous Q4H PRN Claudio Paulson DO   1 mg at 03/10/19 2257   • morphine injection 4 mg  4 mg Intravenous Q3H PRN Cisco Castillo MD   4 mg at 03/13/19 2055   • Morphine sulfate (PF) injection 2 mg  2 mg Intravenous Q4H PRN Manjit Avila DO   2 mg at 03/14/19 2222    And   • naloxone (NARCAN) injection 0.4 mg  0.4 mg Intravenous Q5 Min PRN Manjit Avila DO       • ondansetron (ZOFRAN) injection 4 mg  4 mg Intravenous Q6H PRN Cisco Castillo MD   4 mg at 03/14/19 1615   • sodium chloride 0.9 % bolus 100 mL  100 mL Intravenous PRN Marcelo Snyder MD       • sodium chloride 0.9 % flush 10 mL  10 mL Intravenous PRN Ger Chino MD       • sodium chloride 0.9 % flush 3 mL  3 mL Intravenous Q12H Cisco Castillo MD   3 mL at 03/14/19 2126   • sodium chloride 0.9 % flush 3-10 mL  3-10 mL Intravenous PRN Cisco Castillo MD           Past Medical History:  Past Medical History:   Diagnosis Date   • Bleeding from the nose    • Chronic kidney disease    • Diabetic polyneuropathy associated with type 1 diabetes mellitus (CMS/[facility], located within [facility]) 12/18/2017   • GERD (gastroesophageal reflux disease)    • Hypertension    • Tobacco abuse disorder 12/18/2017   • Type 1 diabetes mellitus with severe nonproliferative retinopathy of both eyes (CMS/[facility], located within [facility]) 12/18/2017       Past Surgical History:  Past Surgical History:   Procedure Laterality Date   • EYE SURGERY Right    • HERNIA REPAIR     • INSERTION HEMODIALYSIS CATHETER Right 3/13/2019    Procedure: HEMODIALYSIS CATHETER INSERTION;  Surgeon: Manjit Avila DO;  Location: [place] [address];  Service: Vascular   • OTHER SURGICAL HISTORY      insulin pump insertion   • SKIN GRAFT         Family History  Family History   Problem Relation Age of Onset   • Hypertension Mother        Social History  Social History     Socioeconomic History   • Marital status: Single     Spouse name: Not on file   • Number of children: Not on file    • Years of education: Not on file   • Highest education level: Not on file   Social Needs   • Financial resource strain: Not on file   • Food insecurity - worry: Not on file   • Food insecurity - inability: Not on file   • Transportation needs - medical: Not on file   • Transportation needs - non-medical: Not on file   Occupational History   • Not on file   Tobacco Use   • Smoking status: Current Every Day Smoker   • Smokeless tobacco: Never Used   Substance and Sexual Activity   • Alcohol use: Not on file   • Drug use: Defer   • Sexual activity: Defer   Other Topics Concern   • Not on file   Social History Narrative   • Not on file         Review of Systems:  History obtained from chart review and the patient  General ROS: positive for  - fatigue  Respiratory ROS: no cough, shortness of breath, or wheezing  Cardiovascular ROS: no chest pain or dyspnea on exertion  Gastrointestinal ROS: no abdominal pain, change in bowel habits, or black or bloody stools  Genito-Urinary ROS: no dysuria, trouble voiding, or hematuria  Musculoskeletal ROS: negative  Neurological ROS: no TIA or stroke symptoms    Objective:  Patient Vitals for the past 24 hrs:   BP Temp Temp src Pulse Resp SpO2 Weight   03/15/19 0834 134/80 -- -- 86 20 93 % --   03/15/19 0830 -- -- -- 91 20 91 % --   03/15/19 0815 140/83 -- -- 96 -- (!) 88 % --   03/15/19 0800 138/84 -- -- 89 -- 91 % --   03/15/19 0745 149/90 -- -- 84 -- 92 % --   03/15/19 0739 -- -- -- -- 18 -- --   03/15/19 0635 150/87 -- -- 90 22 95 % --   03/15/19 0600 -- -- -- -- -- -- 78 kg (171 lb 15.3 oz)   03/15/19 0505 139/78 -- -- 92 24 92 % --   03/15/19 0405 147/81 98.3 °F (36.8 °C) Axillary 93 25 94 % --   03/15/19 0205 143/80 -- -- 93 26 90 % --   03/15/19 0100 148/82 -- -- 95 23 93 % --   03/14/19 2300 143/84 -- -- 94 24 93 % --   03/14/19 2205 155/94 -- -- 89 25 99 % --   03/14/19 2115 146/91 -- -- 94 -- 97 % --   03/14/19 2110 -- -- -- 94 -- 100 % --   03/14/19 2105 156/92 -- -- 95  24 (!) 78 % --   03/14/19 2035 142/87 97.8 °F (36.6 °C) Oral 97 22 92 % --   03/14/19 1950 146/80 -- -- 99 22 92 % --   03/14/19 1924 -- -- -- 96 24 100 % --   03/14/19 1917 166/98 -- -- 92 (!) 36 93 % --   03/14/19 1905 142/90 -- -- 91 28 95 % --   03/14/19 1805 146/85 -- -- 88 -- 92 % --   03/14/19 1720 -- -- -- -- 21 -- --   03/14/19 1705 161/98 -- -- 90 -- 92 % --   03/14/19 1700 -- -- -- -- 19 -- --   03/14/19 1606 -- -- -- -- 23 -- --   03/14/19 1552 169/96 -- -- 89 -- -- --   03/14/19 1549 169/96 -- -- 90 -- -- --   03/14/19 1515 -- -- -- -- 25 -- --   03/14/19 1507 -- -- -- 88 24 96 % --   03/14/19 1505 159/100 -- -- 87 -- 95 % --   03/14/19 1405 -- -- -- 89 -- 94 % --   03/14/19 1305 168/97 -- -- 91 26 96 % --   03/14/19 1222 162/92 -- -- 92 -- -- --   03/14/19 1205 162/92 98.1 °F (36.7 °C) Axillary 89 27 98 % --   03/14/19 1200 160/95 -- -- 93 25 -- --   03/14/19 1105 166/95 -- -- 81 -- 100 % --   03/14/19 1058 -- -- -- -- 16 -- --   03/14/19 1052 -- -- -- 83 16 99 % --   03/14/19 1000 151/95 -- -- 80 18 -- --       Intake/Output Summary (Last 24 hours) at 3/15/2019 0913  Last data filed at 3/15/2019 0800  Gross per 24 hour   Intake 200 ml   Output 3780 ml   Net -3580 ml     General: awake/alert    Neck: supple, no JVD  Chest:  clear to auscultation bilaterally without respiratory distress  CVS: regular rate and rhythm  Abdominal: soft, nontender, positive bowel sounds  Extremities: lower extremity 1+ edema  Skin: warm and dry without rash  Neuro: no focal motor deficits    Labs:  Results from last 7 days   Lab Units 03/15/19  0410 03/14/19  0253 03/13/19  0357   WBC 10*3/mm3 9.02 5.38 6.63   HEMOGLOBIN g/dL 10.3* 8.8* 9.5*   HEMATOCRIT % 31.7* 27.8* 29.6*   PLATELETS 10*3/mm3 232 229 254         Results from last 7 days   Lab Units 03/15/19  0410 03/14/19  0253 03/13/19  0357   SODIUM mmol/L 141 141 145   POTASSIUM mmol/L 4.5 4.9 5.1   CHLORIDE mmol/L 104 105 112*   CO2 mmol/L 24.0 20.0* 22.0*   BUN mg/dL  35* 45* 64*   CREATININE mg/dL 3.04* 3.26* 4.46*   CALCIUM mg/dL 8.3* 8.3* 8.5   BILIRUBIN mg/dL 0.6 0.4 0.4   ALK PHOS U/L 85 76 82   ALT (SGPT) U/L 23 17 27   AST (SGOT) U/L 25 20 20   GLUCOSE mg/dL 168* 178* 138*       Radiology:   Imaging Results (last 72 hours)     Procedure Component Value Units Date/Time    XR Chest 1 View [[id number]] Collected:  03/12/19 0731     Updated:  03/12/19 0736    Narrative:       EXAMINATION: XR CHEST 1 VW-     3/12/2019 3:40 AM CDT     HISTORY: Intubated Patient; J96.01-Acute respiratory failure with  hypoxia; J96.02-Acute respiratory failure with hypercapnia;  N18.9-Chronic kidney disease, unspecified; E87.5-Hyperkalemia;  E87.70-Fluid overload, unspecified; D64.9-Anemia, unspecified.     One view chest x-ray compared with yesterday.     Endotracheal tube remains in good position. The tip is approximately 5  cm above the gabrielle.  A nasogastric tube has been placed and is in good position.     Heart size is unchanged.     Bibasilar consolidation is the same or slightly worse. No improvement.     No pneumothorax.     Summary:  1. No significant change.  This report was finalized on 03/12/2019 07:33 by Dr. Papito Oglesby MD.    US Renal Bilateral [[id number]] Collected:  03/11/19 1515     Updated:  03/11/19 1519    Narrative:       EXAMINATION: US RENAL BILATERAL-     3/11/2019 1:40 PM CDT     HISTORY: CAMILO; J96.01-Acute respiratory failure with hypoxia;  J96.02-Acute respiratory failure with hypercapnia; N18.9-Chronic kidney  disease, unspecified; E87.5-Hyperkalemia; E87.70-Fluid overload,  unspecified; D64.9-Anemia, unspecified.     Detail is limited. There is no hydronephrosis.  Cortical thickness and cortical echogenicity is appropriate.     Right kidney = 1 39 x 51 x 68 mm.  Left kidney = 1 38 x 62 x 63 mm.     Pleural fluid is incidentally noted.     Summary:  1. Symmetric kidneys with no obstruction.  This report was finalized on 03/11/2019 15:16 by Dr. Papito Oglesby MD.    XR  Abdomen KUB [[id number]] Collected:  03/11/19 1101     Updated:  03/11/19 1105    Narrative:       EXAMINATION: XR ABDOMEN KUB-     3/11/2019 10:17 AM CDT     HISTORY: ngt placement; J96.01-Acute respiratory failure with hypoxia;  J96.02-Acute respiratory failure with hypercapnia; N18.9-Chronic kidney  disease, unspecified; E87.5-Hyperkalemia; E87.70-Fluid overload,  unspecified; D64.9-Anemia, unspecified.     Portable radiograph of the upper abdomen and lower chest shows a well  positioned nasogastric tube extending to or beyond the mid stomach.     Bibasilar infiltrate noted.  Normal bones and normal visualized bowel gas pattern.     Summary:  1. Well-positioned nasogastric tube.  This report was finalized on 03/11/2019 11:02 by Dr. Papito Oglesby MD.    XR Chest 1 View [[id number]] Collected:  03/11/19 0657     Updated:  03/11/19 0701    Narrative:       EXAMINATION:   XR CHEST 1 VW-  3/11/2019 6:57 AM CDT     HISTORY: Patient intubated     Frontal upright radiograph of the chest 3/11/2019 3:19 AM CDT     COMPARISON: March 10, 2019.     FINDINGS:   Again bilateral interstitial air space filling infiltrates are present.  Says appearance of pulmonary edema. This is not significantly changed  compared to prior study March 10. Increased density in the right lung  base is noted in the left lung base. This may be posterior pleural  effusions. The cardiac silhouette is normal. Infiltrate tube is  unchanged in position..      The osseous structures and surrounding soft tissues demonstrate no acute  abnormality.       Impression:       1. Persistent bilateral interstitial air space filling infiltrates. Says  appearance pulmonary edema. Bilateral pleural effusions are noted. There  is no significant improvement from March 10.        This report was finalized on 03/11/2019 06:58 by Dr. Natalio Uriostegui MD.    XR Chest 1 View [[id number]] Collected:  03/10/19 0952     Updated:  03/10/19 0956    Narrative:       HISTORY: Dyspnea      CXR: A frontal view the chest is obtained.     COMPARISON: 12/2/2012     FINDINGS: Endotracheal tube is appropriately positioned with the tip at  the T4 level. There are diffuse bilateral pulmonary opacities. Mixed  interstitial alveolar. There are small pleural effusions. Cardiac  silhouette is obscured by the pulmonary opacities. There is no  pneumothorax. There is no acute bony pathology.       Impression:       1. Diffuse mixed interstitial alveolar opacities with small pleural  effusions. Findings may be due to edema and/or pneumonia. Appropriate  positioning of the endotracheal tube.  This report was finalized on 03/10/2019 09:53 by Dr. Teri Schwartz MD.    CT Chest Without Contrast [[id number]] Collected:  03/10/19 0758     Updated:  03/10/19 0805    Narrative:       CT CHEST WO CONTRAST- 3/10/2019 7:35 AM CDT     HISTORY: respiratory distress, eval for pna vs edema      COMPARISON: None     DOSE LENGTH PRODUCT: 477 mGy cm. Automated exposure control was also  utilized to decrease patient radiation dose.     TECHNIQUE: Axial images the chest are obtained without IV contrast     FINDINGS:  The endotracheal tube is appropriate in position with the tip  above the gabrielle. Reactive mediastinal lymph nodes measure up to 10 mm.  The thoracic aorta appears normal in caliber. The heart is borderline  prominent in size. There are small pericardial effusion is identified.  There is a small to moderate right and a small left pleural effusion.     Limited images the upper abdomen demonstrate no adrenal nodules.     There is smooth interlobular septal thickening within the aerated upper  lobes. There are scattered patchy groundglass opacities seen throughout  the lung parenchyma with dense consolidation of the lower lobes. No  discrete endobronchial lesions are identified. There is no pneumothorax.     No focal destructive bony lesions are visualized.       Impression:       1. Bilateral diffuse patchy opacities are  suggestive for multifocal  pneumonia. There is also interlobular septal thickening suggesting  underlying edema. There is a small to moderate right and small left  pleural effusion with a very small pericardial effusion. Heart is  borderline enlarged. Thoracic aorta normal in caliber.  2. Appropriate positioning of the endotracheal tube.  This report was finalized on 03/10/2019 08:02 by Dr. Teri Schwartz MD.          Culture:  Blood Culture   Date Value Ref Range Status   03/10/2019 No growth at 2 days  Preliminary   03/10/2019 No growth at 2 days  Preliminary     Respiratory Culture   Date Value Ref Range Status   03/10/2019 Light growth (2+) Normal Respiratory [place]  Preliminary         Assessment   1.  Acute kidney injury; due to ATN--now on dialysis  2.  Baseline chronic kidney disease stage 4  3.  Type 1 diabetes with nephropathy  4.  Acute hypoxic respiratory failure  5.  Metabolic acidosis--improving  6.  Anemia of chronic kidney disease  7.  Clinically volume overloaded--improving  8.  Essential hypertension    Plan:  1.  Dialysis today  2.  Monitor labs    Aleksandar Conteh, MASOUD  3/15/2019  9:13 AM

## 2019-03-15 NOTE — THERAPY EVALUATION
Acute Care - Speech Language Pathology   Swallow Initial Evaluation [facility]     Patient Name: Jose Shah  : 1985  MRN: [id number]  Today's Date: 3/15/2019               Admit Date: 3/10/2019  Clinical bedside swallow evaluation completed. Full range of consistencies except mechanical soft solids were presented. Pt still has a hoarse/harsh vocal quality. He had up to 5 multiple swallows with the first trial of applesauce. He exhibited delayed weak throat clears 1x with nectar and 1x with thin. He had audible swallows with thin. He had adequate mastication and clearance of oral residue with the regular solid. Recommend continuing NPO except for sips/chips and meds with applesauce.   Princess Vargas, CCC-SLP 3/15/2019 2:05 PM    Visit Dx:     ICD-10-CM ICD-9-CM   1. Acute respiratory failure with hypoxia and hypercapnia (CMS/HCC) J96.01 518.81    J96.02    2. Chronic kidney disease, unspecified CKD stage N18.9 585.9   3. Hyperkalemia E87.5 276.7   4. Hypervolemia, unspecified hypervolemia type E87.70 276.69   5. Anemia, unspecified type D64.9 285.9   6. Acute kidney injury (CMS/HCC) N17.9 584.9   7. Dysphagia, unspecified type R13.10 787.20     Patient Active Problem List   Diagnosis   • Type 1 diabetes, uncontrolled, with gastroparesis (CMS/HCC)   • Diabetic polyneuropathy associated with type 1 diabetes mellitus (CMS/HCC)   • Tobacco abuse disorder   • Vitamin D deficiency   • B12 deficiency   • Acute respiratory failure with hypoxia and hypercapnia (CMS/HCC)   • Acute systolic congestive heart failure (CMS/HCC)   • Acute kidney injury (CMS/HCC)   • Hyperkalemia   • Anemia   • History of recent pneumonia   • Diabetic ulcer of toe of left foot associated with type 1 diabetes mellitus, limited to breakdown of skin (CMS/HCC)   • Chronic kidney disease     Past Medical History:   Diagnosis Date   • Bleeding from the nose    • Chronic kidney disease    • Diabetic polyneuropathy associated with type 1  diabetes mellitus (CMS/[facility]) 12/18/2017   • GERD (gastroesophageal reflux disease)    • Hypertension    • Tobacco abuse disorder 12/18/2017   • Type 1 diabetes mellitus with severe nonproliferative retinopathy of both eyes (CMS/[facility]) 12/18/2017     Past Surgical History:   Procedure Laterality Date   • EYE SURGERY Right    • HERNIA REPAIR     • INSERTION HEMODIALYSIS CATHETER Right 3/13/2019    Procedure: HEMODIALYSIS CATHETER INSERTION;  Surgeon: Manjit Avila DO;  Location: [facility] OR ;  Service: Vascular   • OTHER SURGICAL HISTORY      insulin pump insertion   • SKIN GRAFT          SWALLOW EVALUATION (last 72 hours)      SLP Adult Swallow Evaluation     Row Name 03/15/19 1324                   Rehab Evaluation    Document Type  evaluation  -MB        Subjective Information  no complaints  -MB        Patient Observations  alert;cooperative  -MB        Patient/Family Observations  No family present  -MB           General Information    Patient Profile Reviewed  yes  -MB        Pertinent History Of Current Problem  Acute respiratory failure, ARF with hyperkalemia, DM, mechanical ventilation 3/10/19 - 3/14/19, CXR 3/14/19: large bilateral pleural effusions  -MB        Current Method of Nutrition  NPO  -MB        Precautions/Limitations, Vision  WFL with corrective lenses;other (see comments) glasses not available  -MB        Precautions/Limitations, Hearing  WFL  -MB        Prior Level of Function-Communication  WFL  -MB        Prior Level of Function-Swallowing  no diet consistency restrictions  -MB        Plans/Goals Discussed with  patient  -MB        Barriers to Rehab  none identified  -MB        Patient's Goals for Discharge  patient did not state  -MB           Pain Assessment    Additional Documentation  Pain Scale: FACES Pre/Post-Treatment (Group)  -MB           Pain Scale: FACES Pre/Post-Treatment    Pain: FACES Scale, Pretreatment  2-->hurts little bit  -MB           Oral Motor and Function     Dentition Assessment  natural, present and adequate;poor oral hygiene;teeth are in poor condition  -MB        Secretion Management  WNL/WFL  -MB        Mucosal Quality  other (see comments) white coating of tongue  -MB        Volitional Swallow  WFL  -MB        Volitional Cough  weak  -MB           Oral Musculature and Cranial Nerve Assessment    Oral Motor General Assessment  WFL  -MB           General Eating/Swallowing Observations    Respiratory Support Currently in Use  nasal cannula  -MB        Eating/Swallowing Skills  fed by SLP  -MB        Positioning During Eating  upright in bed  -MB        Utensils Used  spoon;straw  -MB        Consistencies Trialed  regular textures;pureed;thin liquids;nectar/syrup-thick liquids;honey-thick liquids  -MB           Clinical Swallow Eval    Oral Prep Phase  WFL  -MB        Oral Transit  WFL  -MB        Oral Residue  WFL  -MB        Pharyngeal Phase  suspected pharyngeal impairment  -MB        Esophageal Phase  unremarkable  -MB        Clinical Swallow Evaluation Summary  Full range of consistencies except mechanical soft solids were presented. Pt still has a hoarse/harsh vocal quality. He had up to 5 multiple swallows with the first trial of applesauce. He exhibited delayed weak throat clears 1x with nectar and 1x with thin. He had audible swallows with thin. He had adequate mastication and clearance of oral residue with the regular solid.   -MB           Pharyngeal Phase Concerns    Pharyngeal Phase Concerns  multiple swallows;throat clear  -MB        Multiple Swallows  pudding  -MB        Throat Clear  thin;nectar  -MB           Clinical Impression    SLP Swallowing Diagnosis  functional oral phase;suspected pharyngeal dysfunction  -MB        Functional Impact  risk of aspiration/pneumonia  -MB        Rehab Potential/Prognosis, Swallowing  good, to achieve stated therapy goals  -MB        Swallow Criteria for Skilled Therapeutic Interventions Met  demonstrates skilled  criteria  -MB           Recommendations    Therapy Frequency (Swallow)  PRN  -MB        Predicted Duration Therapy Intervention (Days)  until discharge  -MB        SLP Diet Recommendation  NPO;water between meals after oral care, with supervision;ice chips between meals after oral care, with supervision;other (see comments) except sips/chips and meds with applesauce   -MB        Recommended Diagnostics  reassess via clinical swallow evaluation  -MB        Recommended Precautions and Strategies  upright posture during/after eating  -MB        SLP Rec. for Method of Medication Administration  meds whole;meds crushed;with pudding or applesauce  -MB        Monitor for Signs of Aspiration  yes;cough;gurgly voice;throat clearing  -MB        Anticipated Dischage Disposition  unknown  -MB           Swallow Goals (SLP)    Oral Nutrition/Hydration Goal Selection (SLP)  oral nutrition/hydration, SLP goal 1  -MB           Oral Nutrition/Hydration Goal 1 (SLP)    Oral Nutrition/Hydration Goal 1, SLP  Pt will tolerate least restrictive diet with no overt s/s of aspiration.   -MB        Time Frame (Oral Nutrition/Hydration Goal 1, SLP)  by discharge  -MB        Barriers (Oral Nutrition/Hydration Goal 1, SLP)  n/a  -MB        Progress/Outcomes (Oral Nutrition/Hydration Goal 1, SLP)  goal ongoing  -MB          User Key  (r) = Recorded By, (t) = Taken By, (c) = Cosigned By    Initials Name Effective Dates    Princess Nova, CCC-SLP 08/02/16 -           EDUCATION  The patient has been educated in the following areas:   Dysphagia (Swallowing Impairment).    SLP Recommendation and Plan  SLP Swallowing Diagnosis: functional oral phase, suspected pharyngeal dysfunction  SLP Diet Recommendation: NPO, water between meals after oral care, with supervision, ice chips between meals after oral care, with supervision, other (see comments)(except sips/chips and meds with applesauce )  Recommended Precautions and Strategies: upright posture  during/after eating     Monitor for Signs of Aspiration: yes, cough, gurgly voice, throat clearing  Recommended Diagnostics: reassess via clinical swallow evaluation  Swallow Criteria for Skilled Therapeutic Interventions Met: demonstrates skilled criteria  Anticipated Dischage Disposition: unknown  Rehab Potential/Prognosis, Swallowing: good, to achieve stated therapy goals  Therapy Frequency (Swallow): PRN  Predicted Duration Therapy Intervention (Days): until discharge       Plan of Care Reviewed With: patient  Plan of Care Review  Plan of Care Reviewed With: patient  Outcome Summary: Clinical bedside swallow evaluation completed. Full range of consistencies except mechanical soft solids were presented. Pt still has a hoarse/harsh vocal quality. He had up to 5 multiple swallows with the first trial of applesauce. He exhibited delayed weak throat clears 1x with nectar and 1x with thin. He had audible swallows with thin. He had adequate mastication and clearance of oral residue with the regular solid. Recommend continuing NPO except for sips/chips and meds with applesauce.     SLP GOALS     Row Name 03/15/19 1324             Oral Nutrition/Hydration Goal 1 (SLP)    Oral Nutrition/Hydration Goal 1, SLP  Pt will tolerate least restrictive diet with no overt s/s of aspiration.   -MB      Time Frame (Oral Nutrition/Hydration Goal 1, SLP)  by discharge  -MB      Barriers (Oral Nutrition/Hydration Goal 1, SLP)  n/a  -MB      Progress/Outcomes (Oral Nutrition/Hydration Goal 1, SLP)  goal ongoing  -MB        User Key  (r) = Recorded By, (t) = Taken By, (c) = Cosigned By    Initials Name Provider Type    Princess Nova CCC-SLP Speech and Language Pathologist             Time Calculation:   Time Calculation- SLP     Row Name 03/15/19 1404             Time Calculation- SLP    SLP Start Time  1324  -MB      SLP Stop Time  1404  -MB      SLP Time Calculation (min)  40 min  -MB      SLP Received On  03/15/19  -MB       SLP Goal Re-Cert Due Date  03/25/19  -MB        User Key  (r) = Recorded By, (t) = Taken By, (c) = Cosigned By    Initials Name Provider Type    Princess Nova CCC-SLP Speech and Language Pathologist          Therapy Charges for Today     Code Description Service Date Service Provider Modifiers Qty    [id number] HC ST EVAL ORAL PHARYNG SWALLOW 3 3/15/2019 Princess Vargas CCC-SLP GN 1               BRUNA Villagomez  3/15/2019

## 2019-03-15 NOTE — PROGRESS NOTES
PULMONARY AND CRITICAL CARE PROGRESS NOTE - [facility]    Patient: Jose Shah  1985   MR# [id number]   Acct# [id number]  03/15/19   8:59 AM  Referring Provider: Darius Sharma DO    Chief Complaint: Acute respiratory failure    Interval history: The patient extubated yesterday.  He is currently on hemodialysis.  O2 sat 95% on 1/2 L nasal cannula.  Recommend titrating oxygen for sat 91%.  No family at bedside.  No other aggravating or allevitating factors.   Meds:    amLODIPine 5 mg Oral Daily   atorvastatin 80 mg Oral Nightly   chlorhexidine 15 mL Mouth/Throat Q12H   enoxaparin 30 mg Subcutaneous Q24H   famotidine 20 mg Intravenous Daily   heparin (porcine) 3,600 Units Intracatheter Once   heparin (porcine) 3,600 Units Intracatheter Once   heparin (porcine) 4,000 Units Intracatheter Once   hydrALAZINE 20 mg Intravenous Q8H   insulin lispro 2-9 Units Subcutaneous Q6H   ipratropium-albuterol 3 mL Nebulization 4x Daily - RT   iron sucrose (VENOFER) IVPB 200 mg Intravenous Q24H   metoprolol tartrate 10 mg Intravenous Q6H   sodium chloride 3 mL Intravenous Q12H        Review of Systems:   Review of Systems   Constitutional: Negative for chills and fever.   Respiratory: Positive for shortness of breath. Negative for cough.    Cardiovascular: Negative for chest pain.   Gastrointestinal: Positive for diarrhea. Negative for vomiting.     Physical Exam:  SpO2 Percentage    03/15/19 0815 03/15/19 0830 03/15/19 0834   SpO2: (!) 88% 91% 93%     Temp:  [97.8 °F (36.6 °C)-98.3 °F (36.8 °C)] 98.3 °F (36.8 °C)  Heart Rate:  [79-99] 86  Resp:  [16-36] 20  BP: (134-169)/() 134/80  FiO2 (%):  [35 %] 35 %    Intake/Output Summary (Last 24 hours) at 3/15/2019 0859  Last data filed at 3/15/2019 0800  Gross per 24 hour   Intake 200 ml   Output 3780 ml   Net -3580 ml     Physical Exam   Constitutional: He appears well-developed and well-nourished. Nasal cannula in place.   HENT:   Head: Normocephalic and  atraumatic.   Eyes: Conjunctivae and EOM are normal. Pupils are equal, round, and reactive to light. No scleral icterus.   Neck: Normal range of motion. Neck supple.   Cardiovascular: Normal rate, regular rhythm and normal heart sounds. Exam reveals no friction rub.   No murmur heard.  Pulmonary/Chest: Effort normal. No respiratory distress. He has decreased breath sounds. He has no wheezes. He has no rales.   Abdominal: Soft. Bowel sounds are normal. He exhibits no distension. There is no tenderness.   Musculoskeletal: Normal range of motion. He exhibits edema.   Neurological: He is alert.   Skin: Skin is warm and dry.   Psychiatric: He has a normal mood and affect. His behavior is normal.   Nursing note and vitals reviewed.    Laboratory Data:  Results from last 7 days   Lab Units 03/15/19  0410 03/14/19  0253 03/13/19  0357   WBC 10*3/mm3 9.02 5.38 6.63   HEMOGLOBIN g/dL 10.3* 8.8* 9.5*   PLATELETS 10*3/mm3 232 229 254     Results from last 7 days   Lab Units 03/15/19  0410 03/14/19  0253 03/13/19  0357  03/10/19  0555   SODIUM mmol/L 141 141 145   < > 140   POTASSIUM mmol/L 4.5 4.9 5.1   < > 5.8*   BUN mg/dL 35* 45* 64*   < > 66*   CREATININE mg/dL 3.04* 3.26* 4.46*   < > 4.19*   INR   --   --   --   --  0.92    < > = values in this interval not displayed.     Results from last 7 days   Lab Units 03/14/19  0300 03/13/19  0224 03/12/19  0236   PH, ARTERIAL pH units 7.366 7.423 7.407   PCO2, ARTERIAL mm Hg 38.6 35.8 34.3*   PO2 ART mm Hg 96.1 76.2* 64.8*   FIO2 % 60 35 35     Blood Culture   Date Value Ref Range Status   03/10/2019 No growth at 5 days  Final   03/10/2019 No growth at 5 days  Final     Respiratory Culture   Date Value Ref Range Status   03/10/2019 Light growth (2+) Normal Respiratory Althea  Final     Recent films:  Xr Chest 1 View    Result Date: 3/14/2019  1. Large bilateral pleural effusions. There is no improvement from March 13, 2019.   This report was finalized on 03/14/2019 07:15 by   Natalio Uriostegui MD.    Films reviewed personally by me.  My interpretation: None today  Pulmonary Assessment:  1. Acute respiratory failure requiring mechanical ventilation secondary to bilateral lung infiltrates, extubated 3/14/19  2. Poorly controlled diabetes  3. Acute on chronic renal failure  4. Suspected congestive heart failure or fluid overload related to acute on chronic renal failure, with elevated BNP  5. Bilateral pleural effusions likely related to #1  6. Possible incompletely controlled pneumonia or new pneumonia or recent pneumonia with resolution but persistent opacities which have not had time to clear  7. History of vitamin D deficiency  8. Hyperkalemia likely related to renal failure  9. Metabolic acidosis    Recommend:   · The patient is doing well post extubation  · Continue supplemental oxygen as needed for O2 sat 91%  · Change duo nebs to prn  · Continue incentive spirometry and OPEP  · Mobilize    Electronically signed by MASOUD Do, 03/15/19, 8:59 AM     Physician substantive portion:  He is generally doing much better.  He remains off the ventilator.  He is on some supplemental oxygen.  Sounds.  He is awake.  Recommend continue dialysis wean off oxygen as tolerated.  Mobilize as tolerated.  Okay to the floor.    I have seen and examined patient personally, performing a face-to-face diagnostic evaluation with plan of care reviewed and developed with APRN and nursing staff. I have addended and/or modified the above history of present illness, physical examination, and assessment and plan to reflect my findings and impressions. Essential elements of the care plan were discussed with APRN above.  Agree with findings and assessment/plan as documented above.    Electronically signed by Sascha Chiang MD, on 3/15/2019, 8:55 PM

## 2019-03-15 NOTE — PLAN OF CARE
Problem: Patient Care Overview  Goal: Individualization and Mutuality  Outcome: Ongoing (interventions implemented as appropriate)    Goal: Discharge Needs Assessment  Outcome: Ongoing (interventions implemented as appropriate)    Goal: Interprofessional Rounds/Family Conf  Outcome: Ongoing (interventions implemented as appropriate)      Problem: Skin Injury Risk (Adult)  Goal: Skin Health and Integrity  Outcome: Ongoing (interventions implemented as appropriate)      Problem: Fall Risk (Adult)  Goal: Identify Related Risk Factors and Signs and Symptoms  Outcome: Ongoing (interventions implemented as appropriate)    Goal: Absence of Fall  Outcome: Ongoing (interventions implemented as appropriate)      Problem: Pneumonia (Adult)  Goal: Signs and Symptoms of Listed Potential Problems Will be Absent, Minimized or Managed (Pneumonia)  Outcome: Ongoing (interventions implemented as appropriate)      Problem: Renal Failure/Kidney Injury, Acute (Adult)  Goal: Signs and Symptoms of Listed Potential Problems Will be Absent, Minimized or Managed (Renal Failure/Kidney Injury, Acute)  Outcome: Ongoing (interventions implemented as appropriate)

## 2019-03-15 NOTE — PROGRESS NOTES
Continued Stay Note   [facility]     Patient Name: Jose Shah  MRN: [id number]  Today's Date: 3/15/2019    Admit Date: 3/10/2019    Discharge Plan     Row Name 03/15/19 1208       Plan    Plan  Patient is now extubated, receiving dialysis.  Attempted to speak with patient regarding discharge plan/needs and he was sleeping.  Nurse advised patient has been sleeping a great deal.  Will attempt to see patient at another time.        Discharge Codes    No documentation.             NGA Sanchez

## 2019-03-15 NOTE — PROGRESS NOTES
[facility] Medicine Services  INPATIENT PROGRESS NOTE    Patient Name: Jose Shah  Date of Admission: 3/10/2019  Today's Date: 03/15/19  Length of Stay: 5  Primary Care Physician: Dai Boston APRN    Subjective   Chief Complaint: weakness  HPI   He was extubated on the afternoon of 3/14.  He is currently on 5 L nasal cannula. He is very hoarse and may have difficulty with swallowing.  He remains nothing by mouth for now.    Continues to tolerate dialysis with improving labs and volume overload.    He is asking for his mom.     Review of Systems   All pertinent negatives and positives are as above. All other systems have been reviewed and are negative unless otherwise stated.     Objective    Temp:  [97.8 °F (36.6 °C)-98.3 °F (36.8 °C)] 98.3 °F (36.8 °C)  Heart Rate:  [79-99] 90  Resp:  [16-36] 18  BP: (139-169)/() 150/87  FiO2 (%):  [35 %] 35 %  Physical Exam  Constitutional: He appears well-developed and well-nourished. No family present.  Extremely hoarse with a faint voice.  Seen and discussed with his nurse, Darius.   Head: Normocephalic and atraumatic.   Eyes: Conjunctivae are normal. Pupils are equal, round, and reactive to light.   Neck: Neck supple. No JVD present.   Cardiovascular: Normal rate, regular rhythm, normal heart sounds and intact distal pulses. Exam reveals no gallop and no friction rub. No murmur heard.  Pulmonary/Chest: He has rales.   Abdominal: Soft. Bowel sounds are normal. He exhibits no distension. There is no tenderness. There is no rebound and no guarding.   Musculoskeletal: Normal range of motion. He exhibits edema (improving). He exhibits no tenderness or deformity.  Right IJ permacath.  Neurological: Awake and alert. He displays normal reflexes. He exhibits normal muscle tone.  However, he is very generally weak.  Skin: Skin is warm and dry. No rash noted. He has an excoriated ulcer on the left foot on the dorsum involving the fourth toe  predominantly.  This does not appear to be infected.     Intake/Output Summary (Last 24 hours) at 3/15/2019 0814  Last data filed at 3/15/2019 0400  Gross per 24 hour   Intake 200 ml   Output 3600 ml   Net -3400 ml     Results Review:  I have reviewed the labs, radiology results, and diagnostic studies.    Laboratory Data:   Results from last 7 days   Lab Units 03/15/19  0410 03/14/19  0253 03/13/19  0357   WBC 10*3/mm3 9.02 5.38 6.63   HEMOGLOBIN g/dL 10.3* 8.8* 9.5*   HEMATOCRIT % 31.7* 27.8* 29.6*   PLATELETS 10*3/mm3 232 229 254     Results from last 7 days   Lab Units 03/15/19  0410 03/14/19  0253 03/13/19  0357   SODIUM mmol/L 141 141 145   POTASSIUM mmol/L 4.5 4.9 5.1   CHLORIDE mmol/L 104 105 112*   CO2 mmol/L 24.0 20.0* 22.0*   BUN mg/dL 35* 45* 64*   CREATININE mg/dL 3.04* 3.26* 4.46*   CALCIUM mg/dL 8.3* 8.3* 8.5   BILIRUBIN mg/dL 0.6 0.4 0.4   ALK PHOS U/L 85 76 82   ALT (SGPT) U/L 23 17 27   AST (SGOT) U/L 25 20 20   GLUCOSE mg/dL 168* 178* 138*     Culture Data:   Blood Culture   Date Value Ref Range Status   03/10/2019 No growth at 5 days  Final   03/10/2019 No growth at 5 days  Final     Respiratory Culture   Date Value Ref Range Status   03/10/2019 Light growth (2+) Normal Respiratory Althea  Final     I have reviewed the patient's current medications.     Assessment/Plan     Active Hospital Problems    Diagnosis   • **Acute respiratory failure with hypoxia and hypercapnia (CMS/HCC)   • History of recent pneumonia   • Acute systolic congestive heart failure (CMS/HCC)   • Diabetic ulcer of toe of left foot associated with type 1 diabetes mellitus, limited to breakdown of skin (CMS/HCC)   • Acute kidney injury (CMS/HCC)   • Hyperkalemia   • Anemia   • Chronic kidney disease   • Type 1 diabetes, uncontrolled, with gastroparesis (CMS/HCC)   • Diabetic polyneuropathy associated with type 1 diabetes mellitus (CMS/HCC)   • Tobacco abuse disorder     Plan:  He was originally admitted on 3/10 by Dr. Castillo.   He presented with acute hypoxic and hypercarbic respiratory failure.  History was given that he had recently been discharged from [facility] in [place], [place]. He was treated there for pneumonia per report.  He required intubation and was extubated on 3/14.     Pulmonary was consulted to assist with his mechanical ventilation; extubated on 3/14. Continue inhaled bronchodilators.  He was being covered broadly with vancomycin, doxycycline, and meropenem.  I stopped doxycycline on 3/11. Vancomycin stopped on 3/13.  Plan to stop meropenem today.  He received 1 dose of levofloxacin in the ER on 3/10.  Urinary antigens are negative and sputum/blood cultures show no growth thus far.     Nephrology has been consulted.  He was found to be in acute renal failure with hyperkalemia.  He persistent renal failure, but his potassium level is normal today.  He is going to be taken for a permacath by Dr. Avila and initiated hemodialysis on 3/13.      Echocardiogram was obtained and shows an ejection fraction of 46-50%.  He will eventually need to have this more formally addressed.  He also has some hypokinetic segments on echo, which would raise concern for possible underlying coronary disease even at a young age due to his underlying uncontrolled type 1 diabetes.  I would plan to do a Lexiscan once he is more stable.     Monitor hemoglobin; improved.  No signs of bleeding.  Anemia substrates that were checked in February 2019 were normal.     He has a chronic ulcer of the left fourth toe that does not appear to be infected at this point in time.  He does see wound care as an outpatient for this.  We will need to address this more formally at some point as well.     He is a long-standing type I diabetic.  His current hemoglobin A1c is 5.3. Continue Accu-Cheks and sliding scale insulin for now. Start scheduled coverage when he eats.     Speech to see.    Lovenox for DVT prophylaxis.  Pepcid for peptic  ulcer prophylaxis while on ventilatory support.    Transfer to the floor later today if he does well post dialysis.    Darius Sharma,    03/15/19   8:13 AM

## 2019-03-15 NOTE — NURSING NOTE
Dr. Lovett notified that pt failed post extubation dysphagia had hydralazine and lopressor switched to IV.

## 2019-03-16 LAB
ALBUMIN SERPL-MCNC: 3 G/DL (ref 3.5–5)
ALBUMIN/GLOB SERPL: 1.3 G/DL (ref 1.1–2.5)
ALP SERPL-CCNC: 93 U/L (ref 24–120)
ALT SERPL W P-5'-P-CCNC: 21 U/L (ref 0–54)
ANION GAP SERPL CALCULATED.3IONS-SCNC: 17 MMOL/L (ref 4–13)
ANION GAP SERPL CALCULATED.3IONS-SCNC: 25 MMOL/L (ref 4–13)
AST SERPL-CCNC: 26 U/L (ref 7–45)
BASOPHILS # BLD AUTO: 0.08 10*3/MM3 (ref 0–0.2)
BASOPHILS NFR BLD AUTO: 1 % (ref 0–2)
BILIRUB SERPL-MCNC: 0.5 MG/DL (ref 0.1–1)
BUN BLD-MCNC: 38 MG/DL (ref 5–21)
BUN BLD-MCNC: 47 MG/DL (ref 5–21)
BUN/CREAT SERPL: 11.9 (ref 7–25)
BUN/CREAT SERPL: 12.6 (ref 7–25)
CALCIUM SPEC-SCNC: 8.3 MG/DL (ref 8.4–10.4)
CALCIUM SPEC-SCNC: 8.5 MG/DL (ref 8.4–10.4)
CHLORIDE SERPL-SCNC: 96 MMOL/L (ref 98–110)
CHLORIDE SERPL-SCNC: 98 MMOL/L (ref 98–110)
CO2 SERPL-SCNC: 13 MMOL/L (ref 24–31)
CO2 SERPL-SCNC: 23 MMOL/L (ref 24–31)
CREAT BLD-MCNC: 3.18 MG/DL (ref 0.5–1.4)
CREAT BLD-MCNC: 3.74 MG/DL (ref 0.5–1.4)
DEPRECATED RDW RBC AUTO: 50.4 FL (ref 40–54)
EOSINOPHIL # BLD AUTO: 0.03 10*3/MM3 (ref 0–0.7)
EOSINOPHIL NFR BLD AUTO: 0.4 % (ref 0–4)
ERYTHROCYTE [DISTWIDTH] IN BLOOD BY AUTOMATED COUNT: 14.9 % (ref 12–15)
GFR SERPL CREATININE-BSD FRML MDRD: 19 ML/MIN/1.73
GFR SERPL CREATININE-BSD FRML MDRD: 23 ML/MIN/1.73
GLOBULIN UR ELPH-MCNC: 2.4 GM/DL
GLUCOSE BLD-MCNC: 261 MG/DL (ref 70–100)
GLUCOSE BLD-MCNC: 349 MG/DL (ref 70–100)
GLUCOSE BLDC GLUCOMTR-MCNC: 202 MG/DL (ref 70–130)
GLUCOSE BLDC GLUCOMTR-MCNC: 245 MG/DL (ref 70–130)
GLUCOSE BLDC GLUCOMTR-MCNC: 273 MG/DL (ref 70–130)
GLUCOSE BLDC GLUCOMTR-MCNC: 304 MG/DL (ref 70–130)
GLUCOSE BLDC GLUCOMTR-MCNC: 366 MG/DL (ref 70–130)
HCT VFR BLD AUTO: 33 % (ref 40–52)
HGB BLD-MCNC: 10.8 G/DL (ref 14–18)
IMM GRANULOCYTES # BLD AUTO: 0.03 10*3/MM3 (ref 0–0.05)
IMM GRANULOCYTES NFR BLD AUTO: 0.4 % (ref 0–5)
LYMPHOCYTES # BLD AUTO: 1.17 10*3/MM3 (ref 0.72–4.86)
LYMPHOCYTES NFR BLD AUTO: 14.3 % (ref 15–45)
MCH RBC QN AUTO: 29.9 PG (ref 28–32)
MCHC RBC AUTO-ENTMCNC: 32.7 G/DL (ref 33–36)
MCV RBC AUTO: 91.4 FL (ref 82–95)
MONOCYTES # BLD AUTO: 0.61 10*3/MM3 (ref 0.19–1.3)
MONOCYTES NFR BLD AUTO: 7.5 % (ref 4–12)
NEUTROPHILS # BLD AUTO: 6.25 10*3/MM3 (ref 1.87–8.4)
NEUTROPHILS NFR BLD AUTO: 76.4 % (ref 39–78)
NRBC BLD AUTO-RTO: 0 /100 WBC (ref 0–0)
PLATELET # BLD AUTO: 267 10*3/MM3 (ref 130–400)
PMV BLD AUTO: 9.3 FL (ref 6–12)
POTASSIUM BLD-SCNC: 4.5 MMOL/L (ref 3.5–5.3)
POTASSIUM BLD-SCNC: 4.9 MMOL/L (ref 3.5–5.3)
PROT SERPL-MCNC: 5.4 G/DL (ref 6.3–8.7)
RBC # BLD AUTO: 3.61 10*6/MM3 (ref 4.8–5.9)
SODIUM BLD-SCNC: 136 MMOL/L (ref 135–145)
SODIUM BLD-SCNC: 136 MMOL/L (ref 135–145)
WBC NRBC COR # BLD: 8.17 10*3/MM3 (ref 4.8–10.8)

## 2019-03-16 PROCEDURE — 92610 EVALUATE SWALLOWING FUNCTION: CPT

## 2019-03-16 PROCEDURE — 94799 UNLISTED PULMONARY SVC/PX: CPT

## 2019-03-16 PROCEDURE — 82962 GLUCOSE BLOOD TEST: CPT

## 2019-03-16 PROCEDURE — 85025 COMPLETE CBC W/AUTO DIFF WBC: CPT | Performed by: INTERNAL MEDICINE

## 2019-03-16 PROCEDURE — 25010000002 MORPHINE PER 10 MG: Performed by: SURGERY

## 2019-03-16 PROCEDURE — 99232 SBSQ HOSP IP/OBS MODERATE 35: CPT | Performed by: INTERNAL MEDICINE

## 2019-03-16 PROCEDURE — 63710000001 INSULIN LISPRO (HUMAN) PER 5 UNITS: Performed by: INTERNAL MEDICINE

## 2019-03-16 PROCEDURE — 25010000002 ENOXAPARIN PER 10 MG: Performed by: INTERNAL MEDICINE

## 2019-03-16 PROCEDURE — 63710000001 INSULIN DETEMIR PER 5 UNITS: Performed by: INTERNAL MEDICINE

## 2019-03-16 PROCEDURE — 80053 COMPREHEN METABOLIC PANEL: CPT | Performed by: INTERNAL MEDICINE

## 2019-03-16 PROCEDURE — 25010000002 HYDRALAZINE PER 20 MG: Performed by: INTERNAL MEDICINE

## 2019-03-16 RX ADMIN — AMLODIPINE BESYLATE 5 MG: 5 TABLET ORAL at 08:56

## 2019-03-16 RX ADMIN — CHLORHEXIDINE GLUCONATE 15 ML: 1.2 RINSE ORAL at 20:24

## 2019-03-16 RX ADMIN — METOPROLOL TARTRATE 10 MG: 5 INJECTION, SOLUTION INTRAVENOUS at 11:54

## 2019-03-16 RX ADMIN — SODIUM CHLORIDE, PRESERVATIVE FREE 3 ML: 5 INJECTION INTRAVENOUS at 20:23

## 2019-03-16 RX ADMIN — SODIUM CHLORIDE 250 ML: 9 INJECTION, SOLUTION INTRAVENOUS at 11:55

## 2019-03-16 RX ADMIN — FAMOTIDINE 20 MG: 10 INJECTION INTRAVENOUS at 08:56

## 2019-03-16 RX ADMIN — INSULIN DETEMIR 10 UNITS: 100 INJECTION, SOLUTION SUBCUTANEOUS at 12:01

## 2019-03-16 RX ADMIN — ATORVASTATIN CALCIUM 80 MG: 40 TABLET, FILM COATED ORAL at 20:24

## 2019-03-16 RX ADMIN — HYDRALAZINE HYDROCHLORIDE 20 MG: 20 INJECTION INTRAMUSCULAR; INTRAVENOUS at 16:21

## 2019-03-16 RX ADMIN — INSULIN LISPRO 4 UNITS: 100 INJECTION, SOLUTION INTRAVENOUS; SUBCUTANEOUS at 05:44

## 2019-03-16 RX ADMIN — CHLORHEXIDINE GLUCONATE 15 ML: 1.2 RINSE ORAL at 08:56

## 2019-03-16 RX ADMIN — HYDRALAZINE HYDROCHLORIDE 20 MG: 20 INJECTION INTRAMUSCULAR; INTRAVENOUS at 08:56

## 2019-03-16 RX ADMIN — HYDRALAZINE HYDROCHLORIDE 20 MG: 20 INJECTION INTRAMUSCULAR; INTRAVENOUS at 23:51

## 2019-03-16 RX ADMIN — MORPHINE SULFATE 2 MG: 2 INJECTION, SOLUTION INTRAMUSCULAR; INTRAVENOUS at 01:41

## 2019-03-16 RX ADMIN — METOPROLOL TARTRATE 5 MG: 5 INJECTION, SOLUTION INTRAVENOUS at 05:37

## 2019-03-16 RX ADMIN — MORPHINE SULFATE 2 MG: 2 INJECTION, SOLUTION INTRAMUSCULAR; INTRAVENOUS at 20:23

## 2019-03-16 RX ADMIN — ENOXAPARIN SODIUM 30 MG: 30 INJECTION SUBCUTANEOUS at 08:56

## 2019-03-16 RX ADMIN — METOPROLOL TARTRATE 10 MG: 5 INJECTION, SOLUTION INTRAVENOUS at 17:15

## 2019-03-16 RX ADMIN — METOPROLOL TARTRATE 10 MG: 5 INJECTION, SOLUTION INTRAVENOUS at 22:59

## 2019-03-16 RX ADMIN — SODIUM CHLORIDE, PRESERVATIVE FREE 3 ML: 5 INJECTION INTRAVENOUS at 08:55

## 2019-03-16 RX ADMIN — INSULIN LISPRO 4 UNITS: 100 INJECTION, SOLUTION INTRAVENOUS; SUBCUTANEOUS at 11:54

## 2019-03-16 NOTE — PLAN OF CARE
Problem: Patient Care Overview  Goal: Plan of Care Review  Outcome: Ongoing (interventions implemented as appropriate)   03/16/19 1225   OTHER   Outcome Summary Re-eval of swallow fx completed at bedside. Pt was presented a full range of consistencies excluding mechanical soft. Pt appeared to have appropriate acceptance of the bolus with spoon and straw presentations, without noted concern with oral manipulation or bolus transit. Intermittent hoarse vocal quality, yet this was also observed prior to PO trials. No noted concerns with laryngeal elevation. 2x delayed throat clear following thin, appeared to have decreased true vocal fold adduction at that time. Adequate mastication with regular solid with oral cavity clear of residue. No other overt s/s of aspiration than was noted with the thin liquids at this time. Cannot fully r/o aspiration. RECOMMENDATIONS: D/C NPO status, ok to start regular solid diet consistency with nectar thick liquid consistency; meds whole with nectar thick and/or in pudding/applesauce; RN to monitor for increased congestion; ok for ice chips and thin liquid water in between meals, being at least 30 min following any other PO intake. ST to continue to follow and tx for dysphagia. Thanks!    Plan of Care Review   Progress (Eval)   Coping/Psychosocial   Plan of Care Reviewed With patient;other (see comments)  (RN, Aaliyah)

## 2019-03-16 NOTE — PROGRESS NOTES
PULMONARY AND CRITICAL CARE PROGRESS NOTE - [facility]    Patient: Jose Shah  1985   MR# [id number]   Acct# [id number]  03/16/19   12:26 PM  Referring Provider: Darius Sharma DO    Chief Complaint: Acute respiratory failure    Interval history: The patient is doing very well post extubation.  He is in no acute distress at this time.  Meds:    amLODIPine 5 mg Oral Daily   atorvastatin 80 mg Oral Nightly   chlorhexidine 15 mL Mouth/Throat Q12H   enoxaparin 30 mg Subcutaneous Q24H   famotidine 20 mg Intravenous Daily   heparin (porcine) 3,600 Units Intracatheter Once   heparin (porcine) 3,600 Units Intracatheter Once   heparin (porcine) 4,000 Units Intracatheter Once   hydrALAZINE 20 mg Intravenous Q8H   insulin detemir 10 Units Subcutaneous Nightly   insulin lispro 2-9 Units Subcutaneous Q6H   metoprolol tartrate 10 mg Intravenous Q6H   sodium chloride 3 mL Intravenous Q12H        Review of Systems:   Review of Systems   Constitutional: Negative for chills and fever.   Respiratory: Positive for shortness of breath. Negative for cough.    Cardiovascular: Negative for chest pain.   Gastrointestinal: Positive for diarrhea. Negative for vomiting.     Physical Exam:  SpO2 Percentage    03/16/19 0734 03/16/19 1000 03/16/19 1056   SpO2: 96% 96% 96%     Temp:  [98 °F (36.7 °C)-98.5 °F (36.9 °C)] 98.3 °F (36.8 °C)  Heart Rate:  [] 95  Resp:  [14-26] 18  BP: (119-150)/(61-88) 139/72    Intake/Output Summary (Last 24 hours) at 3/16/2019 1226  Last data filed at 3/16/2019 0512  Gross per 24 hour   Intake --   Output 560 ml   Net -560 ml     Physical Exam   Constitutional: He appears well-developed and well-nourished. Nasal cannula in place.   HENT:   Head: Normocephalic and atraumatic.   Eyes: Conjunctivae and EOM are normal. Pupils are equal, round, and reactive to light. No scleral icterus.   Neck: Normal range of motion. Neck supple.   Cardiovascular: Normal rate, regular rhythm and normal  heart sounds. Exam reveals no friction rub.   No murmur heard.  Pulmonary/Chest: Effort normal. No respiratory distress. He has decreased breath sounds. He has no wheezes. He has no rales.   Abdominal: Soft. Bowel sounds are normal. He exhibits no distension. There is no tenderness.   Musculoskeletal: Normal range of motion. He exhibits edema.   Neurological: He is alert.   Skin: Skin is warm and dry.   Psychiatric: He has a normal mood and affect. His behavior is normal.   Nursing note and vitals reviewed.    Laboratory Data:  Results from last 7 days   Lab Units 03/16/19  0514 03/15/19  0410 03/14/19  0253   WBC 10*3/mm3 8.17 9.02 5.38   HEMOGLOBIN g/dL 10.8* 10.3* 8.8*   PLATELETS 10*3/mm3 267 232 229     Results from last 7 days   Lab Units 03/16/19  0514 03/15/19  0410 03/14/19  0253  03/10/19  0555   SODIUM mmol/L 136 141 141   < > 140   POTASSIUM mmol/L 4.9 4.5 4.9   < > 5.8*   BUN mg/dL 38* 35* 45*   < > 66*   CREATININE mg/dL 3.18* 3.04* 3.26*   < > 4.19*   INR   --   --   --   --  0.92    < > = values in this interval not displayed.     Results from last 7 days   Lab Units 03/14/19  0300 03/13/19  0224 03/12/19  0236   PH, ARTERIAL pH units 7.366 7.423 7.407   PCO2, ARTERIAL mm Hg 38.6 35.8 34.3*   PO2 ART mm Hg 96.1 76.2* 64.8*   FIO2 % 60 35 35     Blood Culture   Date Value Ref Range Status   03/10/2019 No growth at 5 days  Final   03/10/2019 No growth at 5 days  Final     Respiratory Culture   Date Value Ref Range Status   03/10/2019 Light growth (2+) Normal Respiratory Althea  Final     Recent films:  No radiology results for the last day  Films reviewed personally by me.  My interpretation: None today  Pulmonary Assessment:  1. Acute respiratory failure requiring mechanical ventilation secondary to bilateral lung infiltrates, extubated 3/14/19  2. Poorly controlled diabetes  3. Acute on chronic renal failure  4. Suspected congestive heart failure or fluid overload related to acute on chronic renal  failure, with elevated BNP  5. Bilateral pleural effusions likely related to #1  6. Possible incompletely controlled pneumonia or new pneumonia or recent pneumonia with resolution but persistent opacities which have not had time to clear  7. History of vitamin D deficiency  8. Hyperkalemia likely related to renal failure  9. Metabolic acidosis    Recommend:   · We can wean his oxygen as he tolerates and otherwise we will continue pulmonary toilet measures.    Electronically signed by Aron Lau MD, 03/16/19, 12:26 PM

## 2019-03-16 NOTE — THERAPY RE-EVALUATION
Acute Care - Speech Language Pathology   Swallow Re-Evaluation Psychiatric     Patient Name: Jose Shah  : 1985  MRN: [id number]  Today's Date: 3/16/2019               Admit Date: 3/10/2019     Re-eval of swallow fx completed at bedside. Pt was presented a full range of consistencies excluding mechanical soft. Pt appeared to have appropriate acceptance of the bolus with spoon and straw presentations, without noted concern with oral manipulation or bolus transit. Intermittent hoarse vocal quality, yet this was also observed prior to PO trials. No noted concerns with laryngeal elevation. 2x delayed throat clear following thin, appeared to have decreased true vocal fold adduction at that time. Adequate mastication with regular solid with oral cavity clear of residue. No other overt s/s of aspiration than was noted with the thin liquids at this time. Cannot fully r/o aspiration. RECOMMENDATIONS: D/C NPO status, ok to start regular solid diet consistency with nectar thick liquid consistency; meds whole with nectar thick and/or in pudding/applesauce; RN to monitor for increased congestion; ok for ice chips and thin liquid water in between meals, being at least 30 min following any other PO intake. ST to continue to follow and tx for dysphagia. Thanks! Nai Yanez, CCC-SLP 3/16/2019 2:57 PM    Visit Dx:     ICD-10-CM ICD-9-CM   1. Acute respiratory failure with hypoxia and hypercapnia (CMS/HCC) J96.01 518.81    J96.02    2. Chronic kidney disease, unspecified CKD stage N18.9 585.9   3. Hyperkalemia E87.5 276.7   4. Hypervolemia, unspecified hypervolemia type E87.70 276.69   5. Anemia, unspecified type D64.9 285.9   6. Acute kidney injury (CMS/HCC) N17.9 584.9   7. Dysphagia, unspecified type R13.10 787.20     Patient Active Problem List   Diagnosis   • Type 1 diabetes, uncontrolled, with gastroparesis (CMS/HCC)   • Diabetic polyneuropathy associated with type 1 diabetes mellitus (CMS/HCC)   • Tobacco abuse  disorder   • Vitamin D deficiency   • B12 deficiency   • Acute respiratory failure with hypoxia and hypercapnia (CMS/[facility])   • Acute systolic congestive heart failure (CMS/HCC)   • Acute kidney injury (CMS/[facility])   • Hyperkalemia   • Anemia   • History of recent pneumonia   • Diabetic ulcer of toe of left foot associated with type 1 diabetes mellitus, limited to breakdown of skin (CMS/HCC)   • Chronic kidney disease     Past Medical History:   Diagnosis Date   • Bleeding from the nose    • Chronic kidney disease    • Diabetic polyneuropathy associated with type 1 diabetes mellitus (CMS/HCC) 12/18/2017   • GERD (gastroesophageal reflux disease)    • Hypertension    • Tobacco abuse disorder 12/18/2017   • Type 1 diabetes mellitus with severe nonproliferative retinopathy of both eyes (CMS/[facility]) 12/18/2017     Past Surgical History:   Procedure Laterality Date   • EYE SURGERY Right    • HERNIA REPAIR     • INSERTION HEMODIALYSIS CATHETER Right 3/13/2019    Procedure: HEMODIALYSIS CATHETER INSERTION;  Surgeon: Manjit Avila DO;  Location: [place] [address];  Service: Vascular   • OTHER SURGICAL HISTORY      insulin pump insertion   • SKIN GRAFT          SWALLOW EVALUATION (last 72 hours)      [facility] Adult Swallow Evaluation     Row Name 03/16/19 1400 03/16/19 1225 03/15/19 1324             Rehab Evaluation    Document Type  --  -TM  re-evaluation  -TM  evaluation  -MB      Subjective Information  --  -TM  no complaints  -TM  no complaints  -MB      Patient Observations  --  -TM  alert;cooperative  -TM  alert;cooperative  -MB      Patient/Family Observations  --  -TM  No family present.  -TM  No family present  -MB      Patient Effort  --  -TM  adequate  -TM  --         General Information    Patient Profile Reviewed  --  -TM  yes  -TM  yes  -MB      Pertinent History Of Current Problem  --  -TM  Acute respiratory failure, ARF with hyperkalemia, DM, mechanical ventilation 3/10/19 - 3/14/19, CXR 3/14/19: large bilateral  pleural effusions  -TM  Acute respiratory failure, ARF with hyperkalemia, DM, mechanical ventilation 3/10/19 - 3/14/19, CXR 3/14/19: large bilateral pleural effusions  -MB      Current Method of Nutrition  --  -TM  NPO  -TM  NPO  -MB      Precautions/Limitations, Vision  --  -TM  WFL with corrective lenses;other (see comments)  -TM  WFL with corrective lenses;other (see comments) glasses not available  -MB      Precautions/Limitations, Hearing  --  -TM  WFL  -TM  WFL  -MB      Prior Level of Function-Communication  --  -TM  WFL  -TM  WFL  -MB      Prior Level of Function-Swallowing  --  -TM  dietary restrictions (e.g. consistent carb, low salt, etc.);other (see comments) Diabetic  -TM  no diet consistency restrictions  -MB      Plans/Goals Discussed with  --  -TM  patient;other (see comments) Aaliyah IZAGUIRRE  -  patient  -MB      Barriers to Rehab  --  -TM  none identified  -TM  none identified  -MB      Patient's Goals for Discharge  --  -TM  return to PO diet  -TM  patient did not state  -MB         Pain Assessment    Additional Documentation  --  -TM  Pain Scale: Numbers Pre/Post-Treatment (Group)  -TM  Pain Scale: FACES Pre/Post-Treatment (Group)  -MB         Pain Scale: Numbers Pre/Post-Treatment    Pain Scale: Numbers, Pretreatment  --  0/10 - no pain  -TM  --      Pain Scale: Numbers, Post-Treatment  --  0/10 - no pain  -TM  --         Pain Scale: FACES Pre/Post-Treatment    Pain: FACES Scale, Pretreatment  --  --  2-->hurts little bit  -MB         Oral Motor and Function    Dentition Assessment  --  teeth are in poor condition  -TM  natural, present and adequate;poor oral hygiene;teeth are in poor condition  -MB      Secretion Management  --  WNL/WFL  -TM  WNL/WFL  -MB      Mucosal Quality  --  moist, healthy  -TM  other (see comments) white coating of tongue  -MB      Volitional Swallow  --  WFL  -TM  WFL  -MB      Volitional Cough  --  WFL  -TM  weak  -MB         Oral Musculature and Cranial Nerve Assessment     Oral Motor General Assessment  --  WFL  -[facility]  -MB         General Eating/Swallowing Observations    Respiratory Support Currently in Use  --  room air  -  nasal cannula  -MB      Eating/Swallowing Skills  --  fed by SLP;self-fed;other (see comments) Regular solid  -TM  fed by SLP  -MB      Positioning During Eating  --  upright 90 degree;upright in bed  -  upright in bed  -MB      Utensils Used  --  spoon;straw  -  spoon;straw  -MB      Consistencies Trialed  --  pudding thick;honey-thick liquids;nectar/syrup-thick liquids;thin liquids;regular textures  -  regular textures;pureed;thin liquids;nectar/syrup-thick liquids;honey-thick liquids  -MB         Respiratory    Respiratory Status  --  WFL  -  --         Clinical Swallow Eval    Oral Prep Phase  --  [facility]  -[facility]  -MB      Oral Transit  --  WFL  -  WFL  -MB      Oral Residue  --  WFL  -  WFL  -MB      Pharyngeal Phase  --  suspected pharyngeal impairment  -  suspected pharyngeal impairment  -MB      Esophageal Phase  --  unremarkable  -  unremarkable  -MB      Clinical Swallow Evaluation Summary  --  Re-eval of swallow fx completed at bedside. Pt was presented a full range of consistencies excluding mechanical soft. Pt appeared to have appropriate acceptance of the bolus with spoon and straw presentations, without noted concern with oral manipulation or bolus transit. Intermittent hoarse vocal quality, yet this was also observed prior to PO trials. No noted concerns with laryngeal elevation. 2x delayed throat clear following thin, appeared to have decreased true vocal fold adduction at that time. Adequate mastication with regular solid with oral cavity clear of residue. No other overt s/s of aspiration than was noted with the thin liquids at this time. Cannot fully r/o aspiration.   -  Full range of consistencies except mechanical soft solids were presented. Pt still has a hoarse/harsh vocal quality. He had up to 5 multiple swallows  with the first trial of applesauce. He exhibited delayed weak throat clears 1x with nectar and 1x with thin. He had audible swallows with thin. He had adequate mastication and clearance of oral residue with the regular solid.   -MB         Pharyngeal Phase Concerns    Pharyngeal Phase Concerns  --  throat clear  -TM  multiple swallows;throat clear  -MB      Multiple Swallows  --  --  pudding  -MB      Throat Clear  --  thin  -TM  thin;nectar  -MB         Clinical Impression    SLP Swallowing Diagnosis  --  functional oral phase;mild;suspected pharyngeal dysfunction  -TM  functional oral phase;suspected pharyngeal dysfunction  -MB      Functional Impact  --  risk of aspiration/pneumonia  -TM  risk of aspiration/pneumonia  -MB      Rehab Potential/Prognosis, Swallowing  --  good, to achieve stated therapy goals  -TM  good, to achieve stated therapy goals  -MB      Swallow Criteria for Skilled Therapeutic Interventions Met  --  demonstrates skilled criteria  -TM  demonstrates skilled criteria  -MB         Recommendations    Therapy Frequency (Swallow)  --  PRN  -TM  PRN  -MB      Predicted Duration Therapy Intervention (Days)  --  until discharge  -TM  until discharge  -MB      SLP Diet Recommendation  --  regular textures;nectar thick liquids;ice chips between meals after oral care, with supervision;water between meals after oral care, with supervision  -TM  NPO;water between meals after oral care, with supervision;ice chips between meals after oral care, with supervision;other (see comments) except sips/chips and meds with applesauce   -MB      Recommended Diagnostics  --  --  reassess via clinical swallow evaluation  -MB      Recommended Precautions and Strategies  --  upright posture during/after eating;small bites of food and sips of liquid  -  upright posture during/after eating  -MB      SLP Rec. for Method of Medication Administration  --  meds whole;with thick liquids;with pudding or applesauce  -  meds  whole;meds crushed;with pudding or applesauce  -MB      Monitor for Signs of Aspiration  --  yes;cough;gurgly voice;throat clearing;pneumonia;right lower lobe infiltrates  -TM  yes;cough;gurgly voice;throat clearing  -MB      Anticipated Dischage Disposition  --  home  -TM  unknown  -MB         Swallow Goals (SLP)    Oral Nutrition/Hydration Goal Selection (SLP)  --  oral nutrition/hydration, SLP goal 1  -TM  oral nutrition/hydration, SLP goal 1  -MB         Oral Nutrition/Hydration Goal 1 (SLP)    Oral Nutrition/Hydration Goal 1, SLP  --  Pt will tolerate least restrictive diet without overt s/s of aspiration.   -TM  Pt will tolerate least restrictive diet with no overt s/s of aspiration.   -MB      Time Frame (Oral Nutrition/Hydration Goal 1, SLP)  --  by discharge  -TM  by discharge  -MB      Barriers (Oral Nutrition/Hydration Goal 1, SLP)  --  n/a  -TM  n/a  -MB      Progress/Outcomes (Oral Nutrition/Hydration Goal 1, SLP)  --  goal ongoing  -TM  goal ongoing  -MB        User Key  (r) = Recorded By, (t) = Taken By, (c) = Cosigned By    Initials Name Effective Dates    MB Princess Vargas, CCC-SLP 08/02/16 -      Nai Yanez, CCC-SLP 08/02/16 -           EDUCATION  The patient has been educated in the following areas:   Dysphagia (Swallowing Impairment).    SLP Recommendation and Plan  SLP Swallowing Diagnosis: functional oral phase, mild, suspected pharyngeal dysfunction  SLP Diet Recommendation: regular textures, nectar thick liquids, ice chips between meals after oral care, with supervision, water between meals after oral care, with supervision  Recommended Precautions and Strategies: upright posture during/after eating, small bites of food and sips of liquid     Monitor for Signs of Aspiration: yes, cough, gurgly voice, throat clearing, pneumonia, right lower lobe infiltrates     Swallow Criteria for Skilled Therapeutic Interventions Met: demonstrates skilled criteria  Anticipated Dischage  Disposition: home  Rehab Potential/Prognosis, Swallowing: good, to achieve stated therapy goals  Therapy Frequency (Swallow): PRN  Predicted Duration Therapy Intervention (Days): until discharge       Plan of Care Reviewed With: patient, other (see comments)(Aaliyah IAZGUIRRE)  Plan of Care Review  Plan of Care Reviewed With: patient, other (see comments)(Aaliyah IZAGUIRRE)  Progress: (Eval)  Outcome Summary: Re-eval of swallow fx completed at bedside. Pt was presented a full range of consistencies excluding mechanical soft. Pt appeared to have appropriate acceptance of the bolus with spoon and straw presentations, without noted concern with oral manipulation or bolus transit. Intermittent hoarse vocal quality, yet this was also observed prior to PO trials. No noted concerns with laryngeal elevation. 2x delayed throat clear following thin, appeared to have decreased true vocal fold adduction at that time. Adequate mastication with regular solid with oral cavity clear of residue. No other overt s/s of aspiration than was noted with the thin liquids at this time. Cannot fully r/o aspiration. RECOMMENDATIONS: D/C NPO status, ok to start regular solid diet consistency with nectar thick liquid consistency; meds whole with nectar thick and/or in pudding/applesauce; RN to monitor for increased congestion; ok for ice chips and thin liquid water in between meals, being at least 30 min following any other PO intake. ST to continue to follow and tx for dysphagia. Thanks!     SLP GOALS     Row Name 03/16/19 1225 03/15/19 1324          Oral Nutrition/Hydration Goal 1 (SLP)    Oral Nutrition/Hydration Goal 1, SLP  Pt will tolerate least restrictive diet without overt s/s of aspiration.   -TM  Pt will tolerate least restrictive diet with no overt s/s of aspiration.   -MB     Time Frame (Oral Nutrition/Hydration Goal 1, SLP)  by discharge  -TM  by discharge  -MB     Barriers (Oral Nutrition/Hydration Goal 1, SLP)  n/a  -TM  n/a  -MB      Progress/Outcomes (Oral Nutrition/Hydration Goal 1, SLP)  goal ongoing  -TM  goal ongoing  -MB       User Key  (r) = Recorded By, (t) = Taken By, (c) = Cosigned By    Initials Name Provider Type    Princess Nova, CCC-SLP Speech and Language Pathologist     Nai Yanez CCC-SLP Speech and Language Pathologist             Time Calculation:   Time Calculation- SLP     Row Name 03/16/19 1455             Time Calculation- SLP    SLP Start Time  1225  -TM      SLP Stop Time  1303  -TM      SLP Time Calculation (min)  38 min  -TM      SLP Received On  03/16/19  -      SLP Goal Re-Cert Due Date  03/26/19  -        User Key  (r) = Recorded By, (t) = Taken By, (c) = Cosigned By    Initials Name Provider Type     Nai Yanez CCC-SLP Speech and Language Pathologist          Therapy Charges for Today     Code Description Service Date Service Provider Modifiers Qty    [id number] HC ST EVAL ORAL PHARYNG SWALLOW 3 3/16/2019 Nai Yanez CCC-SLP GN 1               BRUNA Nickerson  3/16/2019

## 2019-03-16 NOTE — PROGRESS NOTES
Nephrology ([facility]) progress Note      Patient:  Jose Shah  YOB: 1985  Date of Service: 3/16/2019  MRN: [id number]   Acct: [id number]   Primary Care Physician: Dai Boston APRN  Advance Directive:   Code Status and Medical Interventions:   Ordered at: 03/10/19 0743     Code Status:    CPR     Medical Interventions (Level of Support Prior to Arrest):    Full     Admit Date: 3/10/2019       Hospital Day: 6  Referring Provider: No ref. provider found      Patient Seen, Chart, Consults, Notes, Labs, Radiology studies reviewed.    Chief complaint: Abnormal labs.    Subjective:  Jose Shah is a 33 y.o. male  whom we were consulted for acute kidney injury/chronic kidney disease.  Patient has stage IV chronic kidney disease and sees nephrologist in [place].  He also has insulin-dependent type 1 diabetes/juvenile diabetes.  He presented with increasing shortness of breath, dyspnea on exertion and acute respiratory failure/intubated.  He was diagnosed with pneumonia and COPD exacerbation.  He is chronic cigarette smoker and continues to smoke cigarettes.  Hospital course remarkable for acute kidney injury/worsening of renal function, necessitating initiation of dialysis.  He has received several dialysis treatments and was successfully extubated, transferred to regular floor.    This morning he is sitting up and feeling much better, asking questions regarding dialysis.    Allergies:  Penicillins    Home Meds:  Medications Prior to Admission   Medication Sig Dispense Refill Last Dose   • CloNIDine (CATAPRES) 0.1 MG tablet Take 0.1 mg by mouth Every 8 (Eight) Hours.      • famotidine (PEPCID) 20 MG tablet Take 20 mg by mouth 2 (Two) Times a Day.      • hydrALAZINE (APRESOLINE) 25 MG tablet Take 25 mg by mouth Every 6 (Six) Hours As Needed.      • metoprolol tartrate (LOPRESSOR) 50 MG tablet Take 50 mg by mouth 2 (Two) Times a Day.      • Alcohol Swabs (ALCOHOL WIPES) 70 %  pads Use 4 x daily 120 each 11 Taking   • amLODIPine (NORVASC) 5 MG tablet Take 5 mg by mouth Daily.   2/28/2019 at Unknown time   • atorvastatin (LIPITOR) 80 MG tablet Take 80 mg by mouth Daily.   2/28/2019 at Unknown time   • Blood Glucose Monitoring Suppl w/Device kit USE AS INDICATED, ANY MONITOR 1 each 1 Taking   • Glucose Blood (BLOOD GLUCOSE TEST) strip Use 4 x daily, use any brand covered by insurance or same brand as before 120 each 11 Taking   • insulin aspart (NOVOLOG) 100 UNIT/ML injection USE AS DIRECTED UP  UNITS DAILY 5 each 5 3/1/2019 at Unknown time   • Lancet Devices (LANCING DEVICE) misc USE AS INDICATED TO CORRELATE WITH STRIPS AND METER 1 each 1 Taking   • Lancets 30G misc USE 4 X DAILY 120 each 11 Taking   • metoclopramide (REGLAN) 10 MG tablet Take 10 mg by mouth 3 (Three) Times a Day Before Meals.   2/28/2019 at Unknown time   • pantoprazole (PROTONIX) 40 MG EC tablet Take 40 mg by mouth 2 (Two) Times a Day.   2/28/2019 at Unknown time   • Urine Glucose-Ketones Test strip 1 each by In Vitro route As Needed (elevated glucose). 100 each 11 Taking       Medicines:  Current Facility-Administered Medications   Medication Dose Route Frequency Provider Last Rate Last Dose   • albumin human 25 % IV SOLN 12.5 g  12.5 g Intravenous PRN Marcelo Snyder MD       • amLODIPine (NORVASC) tablet 5 mg  5 mg Oral Daily Darius Sharma, DO   5 mg at 03/16/19 0856   • atorvastatin (LIPITOR) tablet 80 mg  80 mg Oral Nightly Darius Sharma DO   80 mg at 03/15/19 2215   • chlorhexidine (PERIDEX) 0.12 % solution 15 mL  15 mL Mouth/Throat Q12H Cisco Castillo MD   15 mL at 03/16/19 0856   • dextrose (D50W) 25 g/ 50mL Intravenous Solution 25 g  25 g Intravenous Q15 Min PRN Cisco Castillo MD       • dextrose (GLUTOSE) oral gel 15 g  15 g Oral Q15 Min PRN Cisco Castillo MD       • enoxaparin (LOVENOX) syringe 30 mg  30 mg Subcutaneous Q24H Cisco Castillo MD   30 mg at 03/16/19 0856   •  famotidine (PEPCID) injection 20 mg  20 mg Intravenous Daily Cisco Castillo MD   20 mg at 03/16/19 0856   • glucagon (human recombinant) (GLUCAGEN DIAGNOSTIC) injection 1 mg  1 mg Subcutaneous PRN Cisco Castillo MD       • heparin (porcine) injection 3,600 Units  3,600 Units Intracatheter Once Claudio, MD Marcelo       • heparin (porcine) injection 3,600 Units  3,600 Units Intracatheter Once Claudio, MD Marcelo       • heparin (porcine) injection 4,000 Units  4,000 Units Intracatheter Once Claudio, MD Marcelo       • hydrALAZINE (APRESOLINE) injection 10 mg  10 mg Intravenous Q6H PRN Darius Sharma DO       • hydrALAZINE (APRESOLINE) injection 20 mg  20 mg Intravenous Q8H Nolan Lovett MD   20 mg at 03/16/19 0856   • insulin detemir (LEVEMIR) injection 10 Units  10 Units Subcutaneous Nightly Darius Sharma DO       • insulin lispro (humaLOG) injection 2-9 Units  2-9 Units Subcutaneous Q6H Darius Sharma DO   4 Units at 03/16/19 1154   • ipratropium-albuterol (DUO-NEB) nebulizer solution 3 mL  3 mL Nebulization Q4H PRN Mariia Santiago APRN       • labetalol (NORMODYNE,TRANDATE) injection 10 mg  10 mg Intravenous Q4H PRN Cisco Castillo MD   10 mg at 03/14/19 1222   • metoprolol tartrate (LOPRESSOR) injection 10 mg  10 mg Intravenous Q6H Nolan Lovett MD   10 mg at 03/16/19 1154   • Morphine sulfate (PF) injection 2 mg  2 mg Intravenous Q4H PRN Manjit Avila DO   2 mg at 03/16/19 0141    And   • naloxone (NARCAN) injection 0.4 mg  0.4 mg Intravenous Q5 Min PRN Manjit Avila DO       • ondansetron (ZOFRAN) injection 4 mg  4 mg Intravenous Q6H PRN Cisco Castillo MD   4 mg at 03/14/19 1615   • sodium chloride 0.9 % bolus 100 mL  100 mL Intravenous PRN Marcelo Snyder MD       • sodium chloride 0.9 % flush 10 mL  10 mL Intravenous PRN Ger Chino MD       • sodium chloride 0.9 % flush 3 mL  3 mL Intravenous Q12H Cisco Castillo MD   3 mL at 03/16/19 9135    • sodium chloride 0.9 % flush 3-10 mL  3-10 mL Intravenous PRN Cisco Castillo MD           Past Medical History:  Past Medical History:   Diagnosis Date   • Bleeding from the nose    • Chronic kidney disease    • Diabetic polyneuropathy associated with type 1 diabetes mellitus (CMS/[facility]) 12/18/2017   • GERD (gastroesophageal reflux disease)    • Hypertension    • Tobacco abuse disorder 12/18/2017   • Type 1 diabetes mellitus with severe nonproliferative retinopathy of both eyes (CMS/[facility]) 12/18/2017       Past Surgical History:  Past Surgical History:   Procedure Laterality Date   • EYE SURGERY Right    • HERNIA REPAIR     • INSERTION HEMODIALYSIS CATHETER Right 3/13/2019    Procedure: HEMODIALYSIS CATHETER INSERTION;  Surgeon: Manjit Avila DO;  Location: [place] [address];  Service: Vascular   • OTHER SURGICAL HISTORY      insulin pump insertion   • SKIN GRAFT         Family History  Family History   Problem Relation Age of Onset   • Hypertension Mother        Social History  Social History     Socioeconomic History   • Marital status: Single     Spouse name: Not on file   • Number of children: Not on file   • Years of education: Not on file   • Highest education level: Not on file   Social Needs   • Financial resource strain: Not on file   • Food insecurity - worry: Not on file   • Food insecurity - inability: Not on file   • Transportation needs - medical: Not on file   • Transportation needs - non-medical: Not on file   Occupational History   • Not on file   Tobacco Use   • Smoking status: Current Every Day Smoker   • Smokeless tobacco: Never Used   Substance and Sexual Activity   • Alcohol use: Not on file   • Drug use: Defer   • Sexual activity: Defer   Other Topics Concern   • Not on file   Social History Narrative   • Not on file         Review of Systems:  History obtained from chart review and the patient  General ROS: No fever or chills  Respiratory ROS: positive for - shortness of  "breath  Cardiovascular ROS: no chest pain or dyspnea on exertion  Gastrointestinal ROS: No abdominal pain or melena  Genito-Urinary ROS: No dysuria or hematuria  14 point ROS reviewed with the patient and negative except as noted above and in the HPI unless unable to obtain.    Objective:  /72 (BP Location: Left arm, Patient Position: Lying)   Pulse 95   Temp 98.3 °F (36.8 °C) (Oral)   Resp 18   Ht 188 cm (74\")   Wt 81.8 kg (180 lb 7 oz)   SpO2 96%   BMI 23.17 kg/m²     Intake/Output Summary (Last 24 hours) at 3/16/2019 1334  Last data filed at 3/16/2019 0512  Gross per 24 hour   Intake --   Output 560 ml   Net -560 ml     General: awake/alert   HEENT: Normocephalic and American  Neck: Supple with no JVD or carotid bruits.  Chest:  clear to auscultation bilaterally without respiratory distress  CVS: regular rate and rhythm  Abdominal: soft, nontender, normal bowel sounds  Extremities: no cyanosis or edema  Skin: warm and dry without rash      Labs:  Results from last 7 days   Lab Units 03/16/19  0514 03/15/19  0410 03/14/19  0253   WBC 10*3/mm3 8.17 9.02 5.38   HEMOGLOBIN g/dL 10.8* 10.3* 8.8*   HEMATOCRIT % 33.0* 31.7* 27.8*   PLATELETS 10*3/mm3 267 232 229         Results from last 7 days   Lab Units 03/16/19  0514 03/15/19  0410 03/14/19  0253   SODIUM mmol/L 136 141 141   POTASSIUM mmol/L 4.9 4.5 4.9   CHLORIDE mmol/L 98 104 105   CO2 mmol/L 13.0* 24.0 20.0*   BUN mg/dL 38* 35* 45*   CREATININE mg/dL 3.18* 3.04* 3.26*   CALCIUM mg/dL 8.5 8.3* 8.3*   BILIRUBIN mg/dL 0.5 0.6 0.4   ALK PHOS U/L 93 85 76   ALT (SGPT) U/L 21 23 17   AST (SGOT) U/L 26 25 20   GLUCOSE mg/dL 261* 168* 178*           Radiology:   Imaging Results (last 72 hours)     Procedure Component Value Units Date/Time    IR Insert Tunneled CV Catheter Without Port 5 Plus [[id number]] Collected:  03/14/19 0703     Updated:  03/15/19 1317    Narrative:       Performed by Dr. Avila. Please see procedure note.  This report was finalized on " 03/15/2019 13:14 by Dr. Manjit Avila MD.    FL C Arm During Surgery [[id number]] Collected:  03/14/19 0703     Updated:  03/15/19 1317    Narrative:       Performed by Dr. Avila. Please see procedure note.  This report was finalized on 03/15/2019 13:14 by Dr. Manjit Avila MD.    XR Chest 1 View [[id number]] Collected:  03/14/19 0714     Updated:  03/14/19 0718    Narrative:       EXAMINATION:   XR CHEST 1 VW-  3/14/2019 7:14 AM CDT     HISTORY: Respiratory failure     Frontal upright radiograph of the chest 3/14/2019 4:09 AM CDT     COMPARISON: March 13, 2019.     FINDINGS:   Moderate to large bilateral pleural effusions are present. The lung  bases are obscured by the large pleural effusions.. Cardiac silhouettes  enlarged. Right internal jugular double lumen catheter satisfactorily  positioned. Endotracheal tube is noted..      The osseous structures and surrounding soft tissues demonstrate no acute  abnormality.       Impression:       1. Large bilateral pleural effusions. There is no improvement from March 13, 2019.        This report was finalized on 03/14/2019 07:15 by Dr. Natalio Uriostegui MD.          Culture:  No components found for: WOUNDCUL, 3  No components found for: CSFCUL, 3  No components found for: BC, 3  No components found for: URINECUL, 3      Assessment   1.  Acute kidney injury/ATN/dialysis dependent.  2.  Stage IV chronic kidney disease baseline.  3.  Diabetic nephropathy..  4.  Type 1 diabetes.  5.  Acute respiratory failure resolved.  6.  Anemia of chronic kidney disease.  7.  Fluid overload/resolving.  8.  COPD with active tobacco use    Plan:  1.  Routine dialysis Monday Wednesday Friday  2.  Outpatient dialysis placement.  3.  Continue Procrit injection with dialysis.      Marcelo Snyder MD  3/16/2019  1:34 PM

## 2019-03-16 NOTE — PROGRESS NOTES
"    [facility] Medicine Services  INPATIENT PROGRESS NOTE    Patient Name: Jose Shah  Date of Admission: 3/10/2019  Today's Date: 03/16/19  Length of Stay: 6  Primary Care Physician: Dai Boston APRN    Subjective   Chief Complaint: weakness, hoarseness  HPI  He desperately wants to eat.  His hoarseness is much better today.  Speech therapy needs to rescreen him today.  They did not allow him to have any oral intake yesterday.    Starting basal insulin.  More hyperglycemic today with depressed bicarb.    He states that he had a stress test a few months ago with Dr. Stewart in [place].  He is unsure of the exact results, but feels like it was \"okay.\"    Review of Systems   All pertinent negatives and positives are as above. All other systems have been reviewed and are negative unless otherwise stated.     Objective    Temp:  [98 °F (36.7 °C)-98.5 °F (36.9 °C)] 98 °F (36.7 °C)  Heart Rate:  [] 90  Resp:  [14-26] 18  BP: ()/(61-88) 130/61  Physical Exam  Constitutional: He appears well-developed and well-nourished. No family present. Hoarseness is greatly improved today.  He looks a lot better today.  Discussed with his nurse, Aaliyah.   Head: Normocephalic and atraumatic.   Eyes: Conjunctivae are normal. Pupils are equal, round, and reactive to light.   Neck: Neck supple. No JVD present.   Cardiovascular: Normal rate, regular rhythm, normal heart sounds and intact distal pulses. Exam reveals no gallop and no friction rub. No murmur heard.  Pulmonary/Chest: He has rales (improving).   Abdominal: Soft. Bowel sounds are normal. He exhibits no distension. There is no tenderness. There is no rebound and no guarding.   Musculoskeletal: Normal range of motion. He exhibits edema (improving). He exhibits no tenderness or deformity.  Right IJ permacath.  Neurological: Awake and alert. He displays normal reflexes. He exhibits normal muscle tone.  However, he is very generally " weak.  Skin: Skin is warm and dry. No rash noted. He has an excoriated ulcer on the left foot on the dorsum involving the fourth toe predominantly.  This does not appear to be infected.     Intake/Output Summary (Last 24 hours) at 3/16/2019 1105  Last data filed at 3/16/2019 0512  Gross per 24 hour   Intake --   Output 3770 ml   Net -3770 ml     Results Review:  I have reviewed the labs, radiology results, and diagnostic studies.    Laboratory Data:   Results from last 7 days   Lab Units 03/16/19  0514 03/15/19  0410 03/14/19  0253   WBC 10*3/mm3 8.17 9.02 5.38   HEMOGLOBIN g/dL 10.8* 10.3* 8.8*   HEMATOCRIT % 33.0* 31.7* 27.8*   PLATELETS 10*3/mm3 267 232 229     Results from last 7 days   Lab Units 03/16/19  0514 03/15/19  0410 03/14/19  0253   SODIUM mmol/L 136 141 141   POTASSIUM mmol/L 4.9 4.5 4.9   CHLORIDE mmol/L 98 104 105   CO2 mmol/L 13.0* 24.0 20.0*   BUN mg/dL 38* 35* 45*   CREATININE mg/dL 3.18* 3.04* 3.26*   CALCIUM mg/dL 8.5 8.3* 8.3*   BILIRUBIN mg/dL 0.5 0.6 0.4   ALK PHOS U/L 93 85 76   ALT (SGPT) U/L 21 23 17   AST (SGOT) U/L 26 25 20   GLUCOSE mg/dL 261* 168* 178*     I have reviewed the patient's current medications.     Assessment/Plan     Active Hospital Problems    Diagnosis   • **Acute respiratory failure with hypoxia and hypercapnia (CMS/HCC)   • History of recent pneumonia   • Acute systolic congestive heart failure (CMS/HCC)   • Diabetic ulcer of toe of left foot associated with type 1 diabetes mellitus, limited to breakdown of skin (CMS/HCC)   • Acute kidney injury (CMS/HCC)   • Hyperkalemia   • Anemia   • Chronic kidney disease   • Type 1 diabetes, uncontrolled, with gastroparesis (CMS/HCC)   • Diabetic polyneuropathy associated with type 1 diabetes mellitus (CMS/HCC)   • Tobacco abuse disorder     Plan:  He was originally admitted on 3/10 by Dr. Castillo.  He presented with acute hypoxic and hypercarbic respiratory failure.  History was given that he had recently been discharged from  [facility] in [place], [place]. He was treated there for pneumonia per report.  He required intubation and was extubated on 3/14.     Pulmonary was consulted to assist with his mechanical ventilation; extubated on 3/14. Continue inhaled bronchodilators as needed. Incentive spirometry.      He was being covered broadly with vancomycin, doxycycline, and meropenem at presentation.  I stopped doxycycline on 3/11. Vancomycin stopped on 3/13.  Stopped meropenem on 3/15.  He received 1 dose of levofloxacin in the ER on 3/10.  Urinary antigens are negative and sputum/blood cultures show no growth thus far.     Nephrology was consulted.  He was found to be in acute renal failure with hyperkalemia. He was taken for a permacath by Dr. Avila and initiated hemodialysis on 3/13.      Echocardiogram was obtained and shows an ejection fraction of 46-50%.  He will eventually need to have this more formally addressed.  He also has some hypokinetic segments on echo, which would raise concern for possible underlying coronary disease even at a young age due to his underlying uncontrolled type 1 diabetes.   He claims that he had [place] few [place] [facility] [place]  Try to obtain records.     Monitor hemoglobin; improved.  No signs of bleeding.  Anemia substrates that were checked in February 2019 were normal.     He has a chronic ulcer of the left fourth toe that does not appear to be infected at this point in time.  He does see wound care as an outpatient for this.  We will need to address this more formally at some point as well.     He is a long-standing type I diabetic.  His current hemoglobin A1c is 5.3. Continue Accu-Cheks and sliding scale insulin for now.  10 units Levemir now.  Repeat BMP at 1600.  He is typically on a pump.  He is not sure which of his family members have it.  I have asked him to have them bring it in.    Speech saw on 3/15 and recommended nothing by mouth except  for ice chips and meds in applesauce. To see again today.     PT consult.      Lovenox for DVT prophylaxis.  Pepcid for peptic ulcer prophylaxis.      Discharge Planning: Remains to be seen what he will need.  Physical therapy to see today.  He will likely need outpatient dialysis set up as well.    Darius Sharma,    03/16/19   10:55 AM

## 2019-03-17 LAB
ALBUMIN SERPL-MCNC: 2.8 G/DL (ref 3.5–5)
ALBUMIN/GLOB SERPL: 1.2 G/DL (ref 1.1–2.5)
ALP SERPL-CCNC: 82 U/L (ref 24–120)
ALT SERPL W P-5'-P-CCNC: 20 U/L (ref 0–54)
ANION GAP SERPL CALCULATED.3IONS-SCNC: 8 MMOL/L (ref 4–13)
AST SERPL-CCNC: 20 U/L (ref 7–45)
BASOPHILS # BLD AUTO: 0.07 10*3/MM3 (ref 0–0.2)
BASOPHILS NFR BLD AUTO: 1 % (ref 0–2)
BILIRUB SERPL-MCNC: 0.4 MG/DL (ref 0.1–1)
BUN BLD-MCNC: 53 MG/DL (ref 5–21)
BUN/CREAT SERPL: 13 (ref 7–25)
CALCIUM SPEC-SCNC: 8.4 MG/DL (ref 8.4–10.4)
CHLORIDE SERPL-SCNC: 102 MMOL/L (ref 98–110)
CO2 SERPL-SCNC: 29 MMOL/L (ref 24–31)
CREAT BLD-MCNC: 4.07 MG/DL (ref 0.5–1.4)
DEPRECATED RDW RBC AUTO: 48.7 FL (ref 40–54)
EOSINOPHIL # BLD AUTO: 0.28 10*3/MM3 (ref 0–0.7)
EOSINOPHIL NFR BLD AUTO: 3.8 % (ref 0–4)
ERYTHROCYTE [DISTWIDTH] IN BLOOD BY AUTOMATED COUNT: 14.7 % (ref 12–15)
GFR SERPL CREATININE-BSD FRML MDRD: 17 ML/MIN/1.73
GLOBULIN UR ELPH-MCNC: 2.3 GM/DL
GLUCOSE BLD-MCNC: 100 MG/DL (ref 70–100)
GLUCOSE BLDC GLUCOMTR-MCNC: 104 MG/DL (ref 70–130)
GLUCOSE BLDC GLUCOMTR-MCNC: 164 MG/DL (ref 70–130)
GLUCOSE BLDC GLUCOMTR-MCNC: 176 MG/DL (ref 70–130)
GLUCOSE BLDC GLUCOMTR-MCNC: 179 MG/DL (ref 70–130)
GLUCOSE BLDC GLUCOMTR-MCNC: 244 MG/DL (ref 70–130)
GLUCOSE BLDC GLUCOMTR-MCNC: 66 MG/DL (ref 70–130)
GLUCOSE BLDC GLUCOMTR-MCNC: 67 MG/DL (ref 70–130)
HCT VFR BLD AUTO: 30.4 % (ref 40–52)
HGB BLD-MCNC: 10.1 G/DL (ref 14–18)
IMM GRANULOCYTES # BLD AUTO: 0.02 10*3/MM3 (ref 0–0.05)
IMM GRANULOCYTES NFR BLD AUTO: 0.3 % (ref 0–5)
LYMPHOCYTES # BLD AUTO: 1.66 10*3/MM3 (ref 0.72–4.86)
LYMPHOCYTES NFR BLD AUTO: 22.6 % (ref 15–45)
MCH RBC QN AUTO: 30.2 PG (ref 28–32)
MCHC RBC AUTO-ENTMCNC: 33.2 G/DL (ref 33–36)
MCV RBC AUTO: 91 FL (ref 82–95)
MONOCYTES # BLD AUTO: 0.85 10*3/MM3 (ref 0.19–1.3)
MONOCYTES NFR BLD AUTO: 11.6 % (ref 4–12)
NEUTROPHILS # BLD AUTO: 4.45 10*3/MM3 (ref 1.87–8.4)
NEUTROPHILS NFR BLD AUTO: 60.7 % (ref 39–78)
NRBC BLD AUTO-RTO: 0 /100 WBC (ref 0–0)
PLATELET # BLD AUTO: 241 10*3/MM3 (ref 130–400)
PMV BLD AUTO: 8.8 FL (ref 6–12)
POTASSIUM BLD-SCNC: 3.8 MMOL/L (ref 3.5–5.3)
PROT SERPL-MCNC: 5.1 G/DL (ref 6.3–8.7)
RBC # BLD AUTO: 3.34 10*6/MM3 (ref 4.8–5.9)
SODIUM BLD-SCNC: 139 MMOL/L (ref 135–145)
WBC NRBC COR # BLD: 7.33 10*3/MM3 (ref 4.8–10.8)

## 2019-03-17 PROCEDURE — 94799 UNLISTED PULMONARY SVC/PX: CPT

## 2019-03-17 PROCEDURE — 25010000002 ENOXAPARIN PER 10 MG: Performed by: INTERNAL MEDICINE

## 2019-03-17 PROCEDURE — 85025 COMPLETE CBC W/AUTO DIFF WBC: CPT | Performed by: INTERNAL MEDICINE

## 2019-03-17 PROCEDURE — 25010000002 HYDRALAZINE PER 20 MG: Performed by: INTERNAL MEDICINE

## 2019-03-17 PROCEDURE — 94760 N-INVAS EAR/PLS OXIMETRY 1: CPT

## 2019-03-17 PROCEDURE — 80053 COMPREHEN METABOLIC PANEL: CPT | Performed by: INTERNAL MEDICINE

## 2019-03-17 PROCEDURE — 82962 GLUCOSE BLOOD TEST: CPT

## 2019-03-17 PROCEDURE — 92526 ORAL FUNCTION THERAPY: CPT

## 2019-03-17 PROCEDURE — 99232 SBSQ HOSP IP/OBS MODERATE 35: CPT | Performed by: INTERNAL MEDICINE

## 2019-03-17 RX ORDER — HYDRALAZINE HYDROCHLORIDE 25 MG/1
25 TABLET, FILM COATED ORAL EVERY 8 HOURS SCHEDULED
Status: DISCONTINUED | OUTPATIENT
Start: 2019-03-17 | End: 2019-03-19 | Stop reason: HOSPADM

## 2019-03-17 RX ORDER — FAMOTIDINE 20 MG/1
20 TABLET, FILM COATED ORAL DAILY
Status: DISCONTINUED | OUTPATIENT
Start: 2019-03-18 | End: 2019-03-19 | Stop reason: HOSPADM

## 2019-03-17 RX ORDER — METOPROLOL TARTRATE 50 MG/1
50 TABLET, FILM COATED ORAL EVERY 12 HOURS SCHEDULED
Status: DISCONTINUED | OUTPATIENT
Start: 2019-03-17 | End: 2019-03-19 | Stop reason: HOSPADM

## 2019-03-17 RX ADMIN — HYDRALAZINE HYDROCHLORIDE 20 MG: 20 INJECTION INTRAMUSCULAR; INTRAVENOUS at 09:08

## 2019-03-17 RX ADMIN — FAMOTIDINE 20 MG: 10 INJECTION INTRAVENOUS at 09:08

## 2019-03-17 RX ADMIN — AMLODIPINE BESYLATE 5 MG: 5 TABLET ORAL at 09:07

## 2019-03-17 RX ADMIN — METOPROLOL TARTRATE 50 MG: 50 TABLET ORAL at 12:57

## 2019-03-17 RX ADMIN — HYDRALAZINE HYDROCHLORIDE 10 MG: 20 INJECTION INTRAMUSCULAR; INTRAVENOUS at 06:55

## 2019-03-17 RX ADMIN — CHLORHEXIDINE GLUCONATE 15 ML: 1.2 RINSE ORAL at 09:07

## 2019-03-17 RX ADMIN — HYDRALAZINE HYDROCHLORIDE 25 MG: 25 TABLET, FILM COATED ORAL at 20:17

## 2019-03-17 RX ADMIN — HYDRALAZINE HYDROCHLORIDE 25 MG: 25 TABLET, FILM COATED ORAL at 16:16

## 2019-03-17 RX ADMIN — METOPROLOL TARTRATE 10 MG: 5 INJECTION, SOLUTION INTRAVENOUS at 04:24

## 2019-03-17 RX ADMIN — ATORVASTATIN CALCIUM 80 MG: 40 TABLET, FILM COATED ORAL at 20:17

## 2019-03-17 RX ADMIN — ENOXAPARIN SODIUM 30 MG: 30 INJECTION SUBCUTANEOUS at 09:07

## 2019-03-17 RX ADMIN — SODIUM CHLORIDE, PRESERVATIVE FREE 3 ML: 5 INJECTION INTRAVENOUS at 09:07

## 2019-03-17 RX ADMIN — METOPROLOL TARTRATE 50 MG: 50 TABLET ORAL at 20:18

## 2019-03-17 NOTE — PLAN OF CARE
Problem: Patient Care Overview  Goal: Plan of Care Review  Outcome: Ongoing (interventions implemented as appropriate)   03/17/19 1210   OTHER   Outcome Summary Swallowing tx completed this PM. At time of ST arrival, pt had just completed lunch, yet agreed to PO trials to determine if he was safe for a liquid upgrade. Pt was presented multiple trials of thin liquid water via straw, alternating between regular solid and thin liquids x1. Pt was noted to have a 1x delayed cough; however, no other overt concerns. Cannot fully r/o aspiration at this time, yet feel pt will be able to tolerate a liquid upgrade at this time, even if an occassional, delayed cough continues. Pt was educated on increased risk for aspiration with thin liquids and that aspiration cannot be r/o; however, he desires an upgrade to thin liquids at this time, as well. He was educated to notify RN and/or MD if more consistent difficulty with thin liquids is experienced and/or if he observes increased congestion. Continue regular solid diet consistency, again upgrade to regular/thin liquids. If concerns arise, downgrade to nectar thick and notify SLP. ST to continue to monitor. Thanks!    Plan of Care Review   Progress improving   Coping/Psychosocial   Plan of Care Reviewed With patient;friend;other (see comments)  (RN, Aaliyah)

## 2019-03-17 NOTE — PROGRESS NOTES
PULMONARY AND CRITICAL CARE PROGRESS NOTE - [facility]    Patient: Jose Shah  1985   MR# [id number]   Acct# [id number]  03/17/19   1:36 PM  Referring Provider: Darius Sharma DO    Chief Complaint: Acute respiratory failure    Interval history: The patient is breathing comfortably on room air with O2 sats in the 90s.  Meds:    amLODIPine 5 mg Oral Daily   atorvastatin 80 mg Oral Nightly   enoxaparin 30 mg Subcutaneous Q24H   [START ON 3/18/2019] famotidine 20 mg Oral Daily   heparin (porcine) 3,600 Units Intracatheter Once   heparin (porcine) 3,600 Units Intracatheter Once   heparin (porcine) 4,000 Units Intracatheter Once   hydrALAZINE 25 mg Oral Q8H   metoprolol tartrate 50 mg Oral Q12H   sodium chloride 3 mL Intravenous Q12H        Review of Systems:   Review of Systems   Constitutional: Negative for chills and fever.   Respiratory: Positive for shortness of breath. Negative for cough.    Cardiovascular: Negative for chest pain.   Gastrointestinal: Positive for diarrhea. Negative for vomiting.     Physical Exam:  SpO2 Percentage    03/17/19 0739 03/17/19 0958 03/17/19 1134   SpO2: 96% 97% 97%     Temp:  [97.9 °F (36.6 °C)-98.2 °F (36.8 °C)] 98 °F (36.7 °C)  Heart Rate:  [78-90] 87  Resp:  [18-20] 18  BP: (126-166)/(72-94) 150/85    Intake/Output Summary (Last 24 hours) at 3/17/2019 1336  Last data filed at 3/17/2019 0943  Gross per 24 hour   Intake 480 ml   Output 400 ml   Net 80 ml     Physical Exam   Constitutional: He appears well-developed and well-nourished. Nasal cannula in place.   HENT:   Head: Normocephalic and atraumatic.   Eyes: Conjunctivae and EOM are normal. Pupils are equal, round, and reactive to light. No scleral icterus.   Neck: Normal range of motion. Neck supple.   Cardiovascular: Normal rate, regular rhythm and normal heart sounds. Exam reveals no friction rub.   No murmur heard.  Pulmonary/Chest: Effort normal. No respiratory distress. He has no wheezes. He has  no rales.   He has improved air movement on chest exam today.   Abdominal: Soft. Bowel sounds are normal. He exhibits no distension. There is no tenderness.   Musculoskeletal: Normal range of motion. He exhibits edema.   Neurological: He is alert.   Skin: Skin is warm and dry.   Psychiatric: He has a normal mood and affect. His behavior is normal.   Nursing note and vitals reviewed.    Laboratory Data:  Results from last 7 days   Lab Units 03/17/19  0504 03/16/19  0514 03/15/19  0410   WBC 10*3/mm3 7.33 8.17 9.02   HEMOGLOBIN g/dL 10.1* 10.8* 10.3*   PLATELETS 10*3/mm3 241 267 232     Results from last 7 days   Lab Units 03/17/19  0504 03/16/19  1507 03/16/19  0514   SODIUM mmol/L 139 136 136   POTASSIUM mmol/L 3.8 4.5 4.9   BUN mg/dL 53* 47* 38*   CREATININE mg/dL 4.07* 3.74* 3.18*     Results from last 7 days   Lab Units 03/14/19  0300 03/13/19  0224 03/12/19  0236   PH, ARTERIAL pH units 7.366 7.423 7.407   PCO2, ARTERIAL mm Hg 38.6 35.8 34.3*   PO2 ART mm Hg 96.1 76.2* 64.8*   FIO2 % 60 35 35     Blood Culture   Date Value Ref Range Status   03/10/2019 No growth at 5 days  Final   03/10/2019 No growth at 5 days  Final     Respiratory Culture   Date Value Ref Range Status   03/10/2019 Light growth (2+) Normal Respiratory Althea  Final     Recent films:  No radiology results for the last day  Films reviewed personally by me.  My interpretation: None today  Pulmonary Assessment:  1. Acute respiratory failure requiring mechanical ventilation secondary to bilateral lung infiltrates, extubated 3/14/19  2. Poorly controlled diabetes  3. Acute on chronic renal failure  4. Suspected congestive heart failure or fluid overload related to acute on chronic renal failure, with elevated BNP  5. Bilateral pleural effusions likely related to #1  6. Possible incompletely controlled pneumonia or new pneumonia or recent pneumonia with resolution but persistent opacities which have not had time to clear  7. History of vitamin D  deficiency  8. Hyperkalemia likely related to renal failure  9. Metabolic acidosis    Recommend:   · He is alright for discharge from a pulmonary standpoint.  I think the effusions seen on his recent chest x-ray were almost certainly due to volume overload and a follow-up chest x-ray can be performed by his primary care provider as an outpatient.    Electronically signed by Aron Lau MD, 03/17/19, 1:36 PM

## 2019-03-17 NOTE — NURSING NOTE
Blood glucose 67 this a.m.  Turkey sandwich given.  Will recheck.  Blood sugar was 179 last pm.  No SSI given.  Pt. also  did not self treat per insulin pump.

## 2019-03-17 NOTE — THERAPY TREATMENT NOTE
Acute Care - Speech Language Pathology   Swallow Treatment Note [facility]     Patient Name: Jose Shah  : 1985  MRN: [id number]  Today's Date: 3/17/2019               Admit Date: 3/10/2019     Swallowing tx completed this PM. At time of ST arrival, pt had just completed lunch, yet agreed to PO trials to determine if he was safe for a liquid upgrade. Pt was presented multiple trials of thin liquid water via straw, alternating between regular solid and thin liquids x1. Pt was noted to have a 1x delayed cough; however, no other overt concerns. Cannot fully r/o aspiration at this time, yet feel pt will be able to tolerate a liquid upgrade at this time, even if an occassional, delayed cough continues. Pt was educated on increased risk for aspiration with thin liquids and that aspiration cannot be r/o; however, he desires an upgrade to thin liquids at this time, as well. He was educated to notify RN and/or MD if more consistent difficulty with thin liquids is experienced and/or if he observes increased congestion. Continue regular solid diet consistency, again upgrade to regular/thin liquids. If concerns arise, downgrade to nectar thick and notify SLP. ST to continue to monitor. Thanks! Nai Yanez, CLIF-SLP 3/17/2019 1:10 PM    Visit Dx:      ICD-10-CM ICD-9-CM   1. Acute respiratory failure with hypoxia and hypercapnia (CMS/HCC) J96.01 518.81    J96.02    2. Chronic kidney disease, unspecified CKD stage N18.9 585.9   3. Hyperkalemia E87.5 276.7   4. Hypervolemia, unspecified hypervolemia type E87.70 276.69   5. Anemia, unspecified type D64.9 285.9   6. Acute kidney injury (CMS/HCC) N17.9 584.9   7. Dysphagia, unspecified type R13.10 787.20     Patient Active Problem List   Diagnosis   • Type 1 diabetes, uncontrolled, with gastroparesis (CMS/HCC)   • Diabetic polyneuropathy associated with type 1 diabetes mellitus (CMS/HCC)   • Tobacco abuse disorder   • Vitamin D deficiency   • B12 deficiency   • Acute  respiratory failure with hypoxia and hypercapnia (CMS/HCC)   • Acute systolic congestive heart failure (CMS/HCC)   • Acute kidney injury (CMS/HCC)   • Hyperkalemia   • Anemia   • History of recent pneumonia   • Diabetic ulcer of toe of left foot associated with type 1 diabetes mellitus, limited to breakdown of skin (CMS/HCC)   • Chronic kidney disease       Therapy Treatment  Rehabilitation Treatment Summary     Row Name 03/17/19 1210             Treatment Time/Intention    Discipline  speech language pathologist  -TM      Document Type  therapy note (daily note)  -TM      Subjective Information  no complaints  -TM      Mode of Treatment  individual therapy;speech-language pathology  -TM      Patient/Family Observations  Female friend present at time of tx.  -TM      Care Plan Review  care plan/treatment goals reviewed;risks/benefits reviewed;current/potential barriers reviewed;patient/other agree to care plan  -TM      Care Plan Review, Other Participant(s)  friend;other (see comments) Aaliyah IZAGUIRRE  -HU      Patient Effort  good  -TM      Recorded by [TM] Nai Yanez CCC-SLP 03/17/19 1304      Row Name 03/17/19 1210             Pain Assessment    Additional Documentation  Pain Scale: Numbers Pre/Post-Treatment (Group)  -TM      Recorded by [TM] Nai Yanez CCC-SLP 03/17/19 1304      Row Name 03/17/19 1210             Pain Scale: Numbers Pre/Post-Treatment    Pain Scale: Numbers, Pretreatment  0/10 - no pain  -TM      Pain Scale: Numbers, Post-Treatment  0/10 - no pain  -TM      Recorded by [TM] aNi Yanez CCC-SLP 03/17/19 1304      Row Name                Wound 03/10/19 0758 Left lower foot diabetic/neuropathic ulceration    Wound - Properties Group Date first assessed: 03/10/19 [KF] Time first assessed: 0758 [KF] Present On Admission : picture taken [KF] Side: Left [KF] Orientation: lower [KF] Location: foot [KF] Type: diabetic/neuropathic ulceration [KF] Stage, Pressure Injury: Stage 2 [KF] Recorded  by:  [KF] Celsa Mata RN 03/10/19 0759    Row Name                Wound 03/11/19 0700 Right toe diabetic/neuropathic ulceration    Wound - Properties Group Date first assessed: 03/11/19 [PF] Time first assessed: 0700 [PF] Present On Admission : yes [PF] Side: Right [PF] Location: toe [PF], between 4th and 5th toe  Type: diabetic/neuropathic ulceration [PF] Stage, Pressure Injury: unstageable [PF] Recorded by:  [PF] Sinai Deng, DMITRIY, BSN 03/11/19 0858    Row Name                Wound 03/13/19 1416 Right neck incision    Wound - Properties Group Date first assessed: 03/13/19 [AC] Time first assessed: 1416 [AC] Side: Right [AC] Location: neck [AC] Type: incision [AC] Recorded by:  [AC] Anusha Palacio RN 03/13/19 1416    Row Name 03/17/19 1210             Outcome Summary/Treatment Plan (SLP)    Daily Summary of Progress (SLP)  progress toward functional goals is good  -TM      Barriers to Overall Progress (SLP)  n/a  -TM      Plan for Continued Treatment (SLP)  Upgrade to thin liquids, continue with regular solid diet consistency.   -TM      Anticipated Dischage Disposition  home  -TM      Recorded by [TM] Nai Yanez CCC-SLP 03/17/19 1304        User Key  (r) = Recorded By, (t) = Taken By, (c) = Cosigned By    Initials Name Effective Dates Discipline    TM Nai Yanez CCC-SLP 08/02/16 -  SLP    Anusha Dave RN 08/02/16 -  Nurse    Sinai Venegas RN, BSN 11/08/16 -  Nurse    Celsa Valadez RN 07/02/18 -  Nurse          Outcome Summary  Outcome Summary/Treatment Plan (SLP)  Daily Summary of Progress (SLP): progress toward functional goals is good (03/17/19 1210 : Nai Yanez CCC-SLP)  Barriers to Overall Progress (SLP): n/a (03/17/19 1210 : Nai Yanez CCC-SLP)  Plan for Continued Treatment (SLP): Upgrade to thin liquids, continue with regular solid diet consistency.  (03/17/19 1210 : Nai Yanez, CCC-SLP)  Anticipated Dischage Disposition: home (03/17/19 1210 :  Nai Yanez [facility]-SLP)      SLP GOALS     Row Name 03/17/19 1210 03/16/19 1225 03/15/19 1324       Oral Nutrition/Hydration Goal 1 (SLP)    Oral Nutrition/Hydration Goal 1, SLP  Pt will tolerate least restrictive diet without overt s/s of aspiration.   -TM  Pt will tolerate least restrictive diet without overt s/s of aspiration.   -TM  Pt will tolerate least restrictive diet with no overt s/s of aspiration.   -MB    Time Frame (Oral Nutrition/Hydration Goal 1, SLP)  by discharge  -TM  by discharge  -TM  by discharge  -MB    Barriers (Oral Nutrition/Hydration Goal 1, SLP)  n/a  -TM  n/a  -TM  n/a  -MB    Progress/Outcomes (Oral Nutrition/Hydration Goal 1, SLP)  continuing progress toward goal  -TM  goal ongoing  -TM  goal ongoing  -MB      User Key  (r) = Recorded By, (t) = Taken By, (c) = Cosigned By    Initials Name Provider Type    Princess Nova CCC-SLP Speech and Language Pathologist     Nai Yanez CCC-SLP Speech and Language Pathologist          EDUCATION  The patient has been educated in the following areas:   Dysphagia (Swallowing Impairment).    SLP Recommendation and Plan  Daily Summary of Progress (SLP): progress toward functional goals is good  Barriers to Overall Progress (SLP): n/a  Plan for Continued Treatment (SLP): Upgrade to thin liquids, continue with regular solid diet consistency.   Anticipated Dischage Disposition: home                    Time Calculation:   Time Calculation- SLP     Row Name 03/17/19 1310             Time Calculation- SLP    SLP Start Time  1210  -      SLP Stop Time  1221  -      SLP Time Calculation (min)  11 min  -      SLP Received On  03/17/19  -        User Key  (r) = Recorded By, (t) = Taken By, (c) = Cosigned By    Initials Name Provider Type    Nai Preston CCC-SLP Speech and Language Pathologist          Therapy Charges for Today     Code Description Service Date Service Provider Modifiers Qty    [id number]  ST EVAL ORAL PHARYNG  SWALLOW 3 3/16/2019 Nai Yanez, CLIF-SLP GN 1    [id number] HC ST TREATMENT SWALLOW 1 3/17/2019 Nai Yanez CCC-SLP GN 1                 BRUNA Nickerson  3/17/2019

## 2019-03-17 NOTE — PLAN OF CARE
Problem: Diabetes, Type 1 (Adult)  Goal: Signs and Symptoms of Listed Potential Problems Will be Absent, Minimized or Managed (Diabetes, Type 1)  Outcome: Ongoing (interventions implemented as appropriate)   03/17/19 0646   Goal/Outcome Evaluation   Problems Assessed (Type 1 Diabetes) all   Problems Present (Type 1 Diabetes) hyperglycemia;hypoglycemia;situational response

## 2019-03-17 NOTE — PROGRESS NOTES
[facility] Medicine Services  INPATIENT PROGRESS NOTE    Patient Name: Jose Shah  Date of Admission: 3/10/2019  Today's Date: 03/17/19  Length of Stay: 7  Primary Care Physician: Dai Boston APRN    Subjective   Chief Complaint: weakness  HPI   Speech finally cleared him for a diet yesterday afternoon.  His family brought in his insulin pump.  I will allow him to try and manage this on his own.    Numbers worsening with dialysis held.  Likely to resume treatments tomorrow.    On room air.    Review of Systems   All pertinent negatives and positives are as above. All other systems have been reviewed and are negative unless otherwise stated.     Objective    Temp:  [97.9 °F (36.6 °C)-98.3 °F (36.8 °C)] 97.9 °F (36.6 °C)  Heart Rate:  [78-96] 80  Resp:  [18-20] 18  BP: (126-166)/(72-94) 157/90  Physical Exam  Constitutional: He appears well-developed and well-nourished.  Voice seems back to normal.  He has a friend at the bedside. He again looks a lot better today. Discussed with his nurse, Aaliyah.   Head: Normocephalic and atraumatic.   Eyes: Conjunctivae are normal. Pupils are equal, round, and reactive to light.   Neck: Neck supple. No JVD present.   Cardiovascular: Normal rate, regular rhythm, normal heart sounds and intact distal pulses. Exam reveals no gallop and no friction rub. No murmur heard.  Pulmonary/Chest: He has no rales. Off oxygen.    Abdominal: Soft. Bowel sounds are normal. He exhibits no distension. There is no tenderness. There is no rebound and no guarding.   Musculoskeletal: Normal range of motion. He exhibits edema (improving). He exhibits no tenderness or deformity.  Right IJ permacath.  Neurological: Awake and alert. He displays normal reflexes. He exhibits normal muscle tone.    Skin: Skin is warm and dry. No rash noted. He has an excoriated ulcer on the left foot on the dorsum involving the fourth toe predominantly.  This does not appear to be  infected.     Results Review:  I have reviewed the labs, radiology results, and diagnostic studies.    Laboratory Data:   Results from last 7 days   Lab Units 03/17/19  0504 03/16/19  0514 03/15/19  0410   WBC 10*3/mm3 7.33 8.17 9.02   HEMOGLOBIN g/dL 10.1* 10.8* 10.3*   HEMATOCRIT % 30.4* 33.0* 31.7*   PLATELETS 10*3/mm3 241 267 232     Results from last 7 days   Lab Units 03/17/19  0504 03/16/19  1507 03/16/19  0514 03/15/19  0410   SODIUM mmol/L 139 136 136 141   POTASSIUM mmol/L 3.8 4.5 4.9 4.5   CHLORIDE mmol/L 102 96* 98 104   CO2 mmol/L 29.0 23.0* 13.0* 24.0   BUN mg/dL 53* 47* 38* 35*   CREATININE mg/dL 4.07* 3.74* 3.18* 3.04*   CALCIUM mg/dL 8.4 8.3* 8.5 8.3*   BILIRUBIN mg/dL 0.4  --  0.5 0.6   ALK PHOS U/L 82  --  93 85   ALT (SGPT) U/L 20  --  21 23   AST (SGOT) U/L 20  --  26 25   GLUCOSE mg/dL 100 349* 261* 168*     I have reviewed the patient's current medications.     Assessment/Plan     Active Hospital Problems    Diagnosis   • **Acute respiratory failure with hypoxia and hypercapnia (CMS/HCC)   • History of recent pneumonia   • Acute systolic congestive heart failure (CMS/HCC)   • Diabetic ulcer of toe of left foot associated with type 1 diabetes mellitus, limited to breakdown of skin (CMS/HCC)   • Acute kidney injury (CMS/HCC)   • Hyperkalemia   • Anemia   • Chronic kidney disease   • Type 1 diabetes, uncontrolled, with gastroparesis (CMS/HCC)   • Diabetic polyneuropathy associated with type 1 diabetes mellitus (CMS/HCC)   • Tobacco abuse disorder     Plan:  He was originally admitted on 3/10 by Dr. Castillo.  He presented with acute hypoxic and hypercarbic respiratory failure.  History was given that he had recently been discharged from [facility] in [place], [place]. He was treated there for pneumonia per report.  He required intubation and was extubated on 3/14.     Pulmonary was consulted to assist with his mechanical ventilation; extubated on 3/14. Continue inhaled  bronchodilators as needed. Incentive spirometry.       He was being covered broadly with vancomycin, doxycycline, and meropenem at presentation.  I stopped doxycycline on 3/11. Vancomycin stopped on 3/13. Stopped meropenem on 3/15.  He received 1 dose of levofloxacin in the ER on 3/10.  Urinary antigens are negative and sputum/blood cultures showed no growth.      Nephrology was consulted.  He was found to be in acute renal failure with hyperkalemia. He was taken for a permacath by Dr. Avila and initiated hemodialysis on 3/13.      Echocardiogram was obtained and shows an ejection fraction of 46-50%.  He will eventually need to have this more formally addressed.  He also has some hypokinetic segments on echo, which would raise concern for possible underlying coronary disease even at a young age due to his underlying uncontrolled type 1 diabetes.   He claims that he had a stress test a few months ago at [facility] with Dr. Stewart.  Trying to obtain records.     Hemoglobin stable. No signs of bleeding.  Anemia substrates that were checked in February 2019 were normal.     He has a chronic ulcer of the left fourth toe that does not appear to be infected at this point in time.  He does see wound care as an outpatient for this.       He is a long-standing type I diabetic.  His current hemoglobin A1c is 5.3. Continue Accu-Cheks and sliding scale insulin for now.  He is typically on a pump.  His family has brought this in.  I will give him a chance to manage his sugars on his own at this point.     Speech is cleared for regular foods with nectar thick liquids.      PT consulted and they haven't seen; will discontinue at this point as he is doing well.      Lovenox for DVT prophylaxis.  Pepcid for peptic ulcer prophylaxis.      Discharge Planning:  Physical therapy has not seen him yet.  I doubt at this point that he actually needs them.  He can likely have dialysis set up tomorrow and be discharged once that is accomplished.   He will need to [place] unit with Dr. Casey.     Darius Sharma,    03/17/19   9:51 AM

## 2019-03-17 NOTE — PROGRESS NOTES
Nephrology ([facility]) progress Note      Patient:  Jose Shah  YOB: 1985  Date of Service: 3/17/2019  MRN: [id number]   Acct: [id number]   Primary Care Physician: Dai Boston APRN  Advance Directive:   Code Status and Medical Interventions:   Ordered at: 03/10/19 0743     Code Status:    CPR     Medical Interventions (Level of Support Prior to Arrest):    Full     Admit Date: 3/10/2019       Hospital Day: 7  Referring Provider: No ref. provider found      Patient Seen, Chart, Consults, Notes, Labs, Radiology studies reviewed.    Chief complaint: Abnormal labs.    Subjective:  Jose Shah is a 33 y.o. male  whom we were consulted for acute kidney injury/chronic kidney disease.  Patient has stage IV chronic kidney disease and sees nephrologist in [place].  He also has insulin-dependent type 1 diabetes/juvenile diabetes.  He presented with increasing shortness of breath, dyspnea on exertion and acute respiratory failure/intubated.  He was diagnosed with pneumonia and COPD exacerbation.  He is chronic cigarette smoker and continues to smoke cigarettes.  Hospital course remarkable for acute kidney injury/worsening of renal function, necessitating initiation of dialysis.  He has received several dialysis treatments and was successfully extubated, now transferred to regular floor.    This morning he is sitting up and feeling much better.  He was up and walking around.    Allergies:  Penicillins    Home Meds:  Medications Prior to Admission   Medication Sig Dispense Refill Last Dose   • CloNIDine (CATAPRES) 0.1 MG tablet Take 0.1 mg by mouth Every 8 (Eight) Hours.      • famotidine (PEPCID) 20 MG tablet Take 20 mg by mouth 2 (Two) Times a Day.      • hydrALAZINE (APRESOLINE) 25 MG tablet Take 25 mg by mouth Every 6 (Six) Hours As Needed.      • metoprolol tartrate (LOPRESSOR) 50 MG tablet Take 50 mg by mouth 2 (Two) Times a Day.      • Alcohol Swabs (ALCOHOL WIPES) 70 % pads  Use 4 x daily 120 each 11 Taking   • amLODIPine (NORVASC) 5 MG tablet Take 5 mg by mouth Daily.   2/28/2019 at Unknown time   • atorvastatin (LIPITOR) 80 MG tablet Take 80 mg by mouth Daily.   2/28/2019 at Unknown time   • Blood Glucose Monitoring Suppl w/Device kit USE AS INDICATED, ANY MONITOR 1 each 1 Taking   • Glucose Blood (BLOOD GLUCOSE TEST) strip Use 4 x daily, use any brand covered by insurance or same brand as before 120 each 11 Taking   • insulin aspart (NOVOLOG) 100 UNIT/ML injection USE AS DIRECTED UP  UNITS DAILY 5 each 5 3/1/2019 at Unknown time   • Lancet Devices (LANCING DEVICE) misc USE AS INDICATED TO CORRELATE WITH STRIPS AND METER 1 each 1 Taking   • Lancets 30G misc USE 4 X DAILY 120 each 11 Taking   • metoclopramide (REGLAN) 10 MG tablet Take 10 mg by mouth 3 (Three) Times a Day Before Meals.   2/28/2019 at Unknown time   • pantoprazole (PROTONIX) 40 MG EC tablet Take 40 mg by mouth 2 (Two) Times a Day.   2/28/2019 at Unknown time   • Urine Glucose-Ketones Test strip 1 each by In Vitro route As Needed (elevated glucose). 100 each 11 Taking       Medicines:  Current Facility-Administered Medications   Medication Dose Route Frequency Provider Last Rate Last Dose   • amLODIPine (NORVASC) tablet 5 mg  5 mg Oral Daily Darius Sharma DO   5 mg at 03/17/19 0907   • atorvastatin (LIPITOR) tablet 80 mg  80 mg Oral Nightly Darius Sharma DO   80 mg at 03/16/19 2024   • dextrose (D50W) 25 g/ 50mL Intravenous Solution 25 g  25 g Intravenous Q15 Min PRN Cisco Castillo MD       • dextrose (GLUTOSE) oral gel 15 g  15 g Oral Q15 Min PRN Cisco Castillo MD       • enoxaparin (LOVENOX) syringe 30 mg  30 mg Subcutaneous Q24H Cisco Castillo MD   30 mg at 03/17/19 0907   • [START ON 3/18/2019] famotidine (PEPCID) tablet 20 mg  20 mg Oral Daily Darius Sharma DO       • glucagon (human recombinant) (GLUCAGEN DIAGNOSTIC) injection 1 mg  1 mg Subcutaneous PRN Cisco Castillo MD        • heparin (porcine) injection 3,600 Units  3,600 Units Intracatheter Once Marcelo Snyder MD       • heparin (porcine) injection 3,600 Units  3,600 Units Intracatheter Once Marcelo Snyder MD       • heparin (porcine) injection 4,000 Units  4,000 Units Intracatheter Once Marcelo Snyder MD       • hydrALAZINE (APRESOLINE) injection 10 mg  10 mg Intravenous Q6H PRN Darius Sharma, DO   10 mg at 03/17/19 0655   • hydrALAZINE (APRESOLINE) tablet 25 mg  25 mg Oral Q8H Darius Sharma, DO       • ipratropium-albuterol (DUO-NEB) nebulizer solution 3 mL  3 mL Nebulization Q4H PRN Mariia Santiago APRN       • labetalol (NORMODYNE,TRANDATE) injection 10 mg  10 mg Intravenous Q4H PRN Cisco Castillo MD   10 mg at 03/14/19 1222   • metoprolol tartrate (LOPRESSOR) tablet 50 mg  50 mg Oral Q12H Darius Sharma, DO   50 mg at 03/17/19 1257   • Morphine sulfate (PF) injection 2 mg  2 mg Intravenous Q4H PRN Manjit Avila DO   2 mg at 03/16/19 2023    And   • naloxone (NARCAN) injection 0.4 mg  0.4 mg Intravenous Q5 Min PRN Manjit Avila DO       • ondansetron (ZOFRAN) injection 4 mg  4 mg Intravenous Q6H PRN Cisco Castillo MD   4 mg at 03/14/19 1615   • sodium chloride 0.9 % bolus 100 mL  100 mL Intravenous PRN Marcelo Snyder MD       • sodium chloride 0.9 % flush 10 mL  10 mL Intravenous PRN Ger Chino MD       • sodium chloride 0.9 % flush 3 mL  3 mL Intravenous Q12H Cisco Castillo MD   3 mL at 03/17/19 0907   • sodium chloride 0.9 % flush 3-10 mL  3-10 mL Intravenous PRN Cisco Castillo MD           Past Medical History:  Past Medical History:   Diagnosis Date   • Bleeding from the nose    • Chronic kidney disease    • Diabetic polyneuropathy associated with type 1 diabetes mellitus (CMS/HCC) 12/18/2017   • GERD (gastroesophageal reflux disease)    • Hypertension    • Tobacco abuse disorder 12/18/2017   • Type 1 diabetes mellitus with severe nonproliferative retinopathy of  "both eyes (CMS/HCC) 12/18/2017       Past Surgical History:  Past Surgical History:   Procedure Laterality Date   • EYE SURGERY Right    • HERNIA REPAIR     • INSERTION HEMODIALYSIS CATHETER Right 3/13/2019    Procedure: HEMODIALYSIS CATHETER INSERTION;  Surgeon: Manjit Avila DO;  Location: [place] [id number];  Service: Vascular   • OTHER SURGICAL HISTORY      insulin pump insertion   • SKIN GRAFT         Family History  Family History   Problem Relation Age of Onset   • Hypertension Mother        Social History  Social History     Socioeconomic History   • Marital status: Single     Spouse name: Not on file   • Number of children: Not on file   • Years of education: Not on file   • Highest education level: Not on file   Social Needs   • Financial resource strain: Not on file   • Food insecurity - worry: Not on file   • Food insecurity - inability: Not on file   • Transportation needs - medical: Not on file   • Transportation needs - non-medical: Not on file   Occupational History   • Not on file   Tobacco Use   • Smoking status: Current Every Day Smoker   • Smokeless tobacco: Never Used   Substance and Sexual Activity   • Alcohol use: Not on file   • Drug use: Defer   • Sexual activity: Defer   Other Topics Concern   • Not on file   Social History Narrative   • Not on file         Review of Systems:  History obtained from chart review and the patient  General ROS: No fever or chills  Respiratory ROS: positive for - shortness of breath  Cardiovascular ROS: no chest pain or dyspnea on exertion  Gastrointestinal ROS: No abdominal pain or melena  Genito-Urinary ROS: No dysuria or hematuria  14 point ROS reviewed with the patient and negative except as noted above and in the HPI unless unable to obtain.    Objective:  /85 (BP Location: Right arm, Patient Position: Sitting)   Pulse 87   Temp 98 °F (36.7 °C) (Oral)   Resp 18   Ht 188 cm (74\")   Wt 82.1 kg (181 lb)   SpO2 97%   BMI 23.24 kg/m² "     Intake/Output Summary (Last 24 hours) at 3/17/2019 1309  Last data filed at 3/17/2019 0943  Gross per 24 hour   Intake 480 ml   Output 400 ml   Net 80 ml     General: awake/alert   HEENT: Normocephalic and American  Neck: Supple with no JVD or carotid bruits.  Chest:  clear to auscultation bilaterally without respiratory distress  CVS: regular rate and rhythm  Abdominal: soft, nontender, normal bowel sounds  Extremities: no cyanosis or edema  Skin: warm and dry without rash      Labs:  Results from last 7 days   Lab Units 03/17/19  0504 03/16/19  0514 03/15/19  0410   WBC 10*3/mm3 7.33 8.17 9.02   HEMOGLOBIN g/dL 10.1* 10.8* 10.3*   HEMATOCRIT % 30.4* 33.0* 31.7*   PLATELETS 10*3/mm3 241 267 232         Results from last 7 days   Lab Units 03/17/19  0504 03/16/19  1507 03/16/19  0514 03/15/19  0410   SODIUM mmol/L 139 136 136 141   POTASSIUM mmol/L 3.8 4.5 4.9 4.5   CHLORIDE mmol/L 102 96* 98 104   CO2 mmol/L 29.0 23.0* 13.0* 24.0   BUN mg/dL 53* 47* 38* 35*   CREATININE mg/dL 4.07* 3.74* 3.18* 3.04*   CALCIUM mg/dL 8.4 8.3* 8.5 8.3*   BILIRUBIN mg/dL 0.4  --  0.5 0.6   ALK PHOS U/L 82  --  93 85   ALT (SGPT) U/L 20  --  21 23   AST (SGOT) U/L 20  --  26 25   GLUCOSE mg/dL 100 349* 261* 168*           Radiology:   Imaging Results (last 72 hours)     Procedure Component Value Units Date/Time    IR Insert Tunneled CV Catheter Without Port 5 Plus [[id number]] Collected:  03/14/19 0703     Updated:  03/15/19 1317    Narrative:       Performed by Dr. Avila. Please see procedure note.  This report was finalized on 03/15/2019 13:14 by Dr. Manjit Avila MD.    FL C Arm During Surgery [[id number]] Collected:  03/14/19 0703     Updated:  03/15/19 1317    Narrative:       Performed by Dr. Avila. Please see procedure note.  This report was finalized on 03/15/2019 13:14 by Dr. Manjit Avila MD.    XR Chest 1 View [[id number]] Collected:  03/14/19 0714     Updated:  03/14/19 0718    Narrative:       EXAMINATION:   XR  CHEST 1 VW-  3/14/2019 7:14 AM CDT     HISTORY: Respiratory failure     Frontal upright radiograph of the chest 3/14/2019 4:09 AM CDT     COMPARISON: March 13, 2019.     FINDINGS:   Moderate to large bilateral pleural effusions are present. The lung  bases are obscured by the large pleural effusions.. Cardiac silhouettes  enlarged. Right internal jugular double lumen catheter satisfactorily  positioned. Endotracheal tube is noted..      The osseous structures and surrounding soft tissues demonstrate no acute  abnormality.       Impression:       1. Large bilateral pleural effusions. There is no improvement from March 13, 2019.        This report was finalized on 03/14/2019 07:15 by Dr. Natalio Uriostegui MD.          Culture:  No components found for: WOUNDCUL, 3  No components found for: CSFCUL, 3  No components found for: BC, 3  No components found for: URINECUL, 3      Assessment   1.  Acute kidney injury/ATN/dialysis dependent.  2.  Stage IV chronic kidney disease baseline.  3.  Diabetic nephropathy..  4.  Type 1 diabetes.  5.  Acute respiratory failure resolved.  6.  Anemia of chronic kidney disease.  7.  Fluid overload/resolving.  8.  COPD with active tobacco use    Plan:  1.  Resume hemodialysis treatment in the morning  2.  Outpatient dialysis placement.  3.  Continue Procrit injection with dialysis.      Marcelo Snyder MD  3/17/2019  1:09 PM

## 2019-03-17 NOTE — PLAN OF CARE
Problem: Patient Care Overview  Goal: Plan of Care Review  Outcome: Ongoing (interventions implemented as appropriate)   03/17/19 0445   OTHER   Outcome Summary VSS this shift. Medicated x1 for c/o left foot/neuropathy pain. No meals given this shift. Blood glucose 179 last pm. Pt. self doses sliding scale insulin per insulin pump. No extra insulin per pt. last p.m. O2 sat 95% on room air. No s/s respiratory distress. No c/o swallowing difficulty.  Safety maintained. Continue to monitor.      Goal: Individualization and Mutuality  Outcome: Ongoing (interventions implemented as appropriate)   03/17/19 0445   Individualization   Patient Specific Interventions Pt. does self insulin dosing per insulin pump.     Goal: Interprofessional Rounds/Family Conf  Outcome: Ongoing (interventions implemented as appropriate)   03/17/19 0445   Interdisciplinary Rounds/Family Conf   Participants nursing;patient       Problem: Skin Injury Risk (Adult)  Goal: Identify Related Risk Factors and Signs and Symptoms  Outcome: Ongoing (interventions implemented as appropriate)   03/17/19 0445   Skin Injury Risk (Adult)   Related Risk Factors (Skin Injury Risk) critical care admission;edema     Goal: Skin Health and Integrity  Outcome: Ongoing (interventions implemented as appropriate)   03/17/19 0445   Skin Injury Risk (Adult)   Skin Health and Integrity making progress toward outcome       Problem: Fall Risk (Adult)  Goal: Absence of Fall  Outcome: Ongoing (interventions implemented as appropriate)   03/17/19 0445   Fall Risk (Adult)   Absence of Fall making progress toward outcome       Problem: Pneumonia (Adult)  Goal: Signs and Symptoms of Listed Potential Problems Will be Absent, Minimized or Managed (Pneumonia)  Outcome: Ongoing (interventions implemented as appropriate)   03/17/19 0445   Goal/Outcome Evaluation   Problems Assessed (Pneumonia) all   Problems Present (Pneumonia) fluid/electrolyte imbalance       Problem: Renal  Failure/Kidney Injury, Acute (Adult)  Goal: Signs and Symptoms of Listed Potential Problems Will be Absent, Minimized or Managed (Renal Failure/Kidney Injury, Acute)  Outcome: Ongoing (interventions implemented as appropriate)   03/17/19 3047   Goal/Outcome Evaluation   Problems Assessed (Acute Renal Failure/Kidney Injury) all   Problems Present (ARF/Kidney Injury) situational response;hypertension

## 2019-03-18 LAB
ALBUMIN SERPL-MCNC: 2.9 G/DL (ref 3.5–5)
ALBUMIN/GLOB SERPL: 1 G/DL (ref 1.1–2.5)
ALP SERPL-CCNC: 93 U/L (ref 24–120)
ALT SERPL W P-5'-P-CCNC: 18 U/L (ref 0–54)
ANION GAP SERPL CALCULATED.3IONS-SCNC: 11 MMOL/L (ref 4–13)
AST SERPL-CCNC: 25 U/L (ref 7–45)
BASOPHILS # BLD AUTO: 0.06 10*3/MM3 (ref 0–0.2)
BASOPHILS NFR BLD AUTO: 1 % (ref 0–2)
BILIRUB SERPL-MCNC: 0.5 MG/DL (ref 0.1–1)
BUN BLD-MCNC: 59 MG/DL (ref 5–21)
BUN/CREAT SERPL: 14.9 (ref 7–25)
CALCIUM SPEC-SCNC: 8.4 MG/DL (ref 8.4–10.4)
CHLORIDE SERPL-SCNC: 100 MMOL/L (ref 98–110)
CO2 SERPL-SCNC: 28 MMOL/L (ref 24–31)
CREAT BLD-MCNC: 3.95 MG/DL (ref 0.5–1.4)
DEPRECATED RDW RBC AUTO: 49.9 FL (ref 40–54)
EOSINOPHIL # BLD AUTO: 0.22 10*3/MM3 (ref 0–0.7)
EOSINOPHIL NFR BLD AUTO: 3.6 % (ref 0–4)
ERYTHROCYTE [DISTWIDTH] IN BLOOD BY AUTOMATED COUNT: 14.8 % (ref 12–15)
GFR SERPL CREATININE-BSD FRML MDRD: 18 ML/MIN/1.73
GLOBULIN UR ELPH-MCNC: 3 GM/DL
GLUCOSE BLD-MCNC: 170 MG/DL (ref 70–100)
GLUCOSE BLDC GLUCOMTR-MCNC: 139 MG/DL (ref 70–130)
GLUCOSE BLDC GLUCOMTR-MCNC: 212 MG/DL (ref 70–130)
GLUCOSE BLDC GLUCOMTR-MCNC: 279 MG/DL (ref 70–130)
GLUCOSE BLDC GLUCOMTR-MCNC: 86 MG/DL (ref 70–130)
HCT VFR BLD AUTO: 34.6 % (ref 40–52)
HGB BLD-MCNC: 11.5 G/DL (ref 14–18)
IMM GRANULOCYTES # BLD AUTO: 0.01 10*3/MM3 (ref 0–0.05)
IMM GRANULOCYTES NFR BLD AUTO: 0.2 % (ref 0–5)
LYMPHOCYTES # BLD AUTO: 1.67 10*3/MM3 (ref 0.72–4.86)
LYMPHOCYTES NFR BLD AUTO: 27 % (ref 15–45)
MCH RBC QN AUTO: 30.5 PG (ref 28–32)
MCHC RBC AUTO-ENTMCNC: 33.2 G/DL (ref 33–36)
MCV RBC AUTO: 91.8 FL (ref 82–95)
MONOCYTES # BLD AUTO: 0.64 10*3/MM3 (ref 0.19–1.3)
MONOCYTES NFR BLD AUTO: 10.3 % (ref 4–12)
NEUTROPHILS # BLD AUTO: 3.59 10*3/MM3 (ref 1.87–8.4)
NEUTROPHILS NFR BLD AUTO: 57.9 % (ref 39–78)
NRBC BLD AUTO-RTO: 0 /100 WBC (ref 0–0)
PLATELET # BLD AUTO: 265 10*3/MM3 (ref 130–400)
PMV BLD AUTO: 9.2 FL (ref 6–12)
POTASSIUM BLD-SCNC: 4.6 MMOL/L (ref 3.5–5.3)
PROT SERPL-MCNC: 5.9 G/DL (ref 6.3–8.7)
RBC # BLD AUTO: 3.77 10*6/MM3 (ref 4.8–5.9)
SODIUM BLD-SCNC: 139 MMOL/L (ref 135–145)
WBC NRBC COR # BLD: 6.19 10*3/MM3 (ref 4.8–10.8)

## 2019-03-18 PROCEDURE — 25010000002 ENOXAPARIN PER 10 MG: Performed by: INTERNAL MEDICINE

## 2019-03-18 PROCEDURE — 5A1D70Z PERFORMANCE OF URINARY FILTRATION, INTERMITTENT, LESS THAN 6 HOURS PER DAY: ICD-10-PCS | Performed by: NURSE PRACTITIONER

## 2019-03-18 PROCEDURE — 82962 GLUCOSE BLOOD TEST: CPT

## 2019-03-18 PROCEDURE — 80053 COMPREHEN METABOLIC PANEL: CPT | Performed by: INTERNAL MEDICINE

## 2019-03-18 PROCEDURE — 94799 UNLISTED PULMONARY SVC/PX: CPT

## 2019-03-18 PROCEDURE — 94760 N-INVAS EAR/PLS OXIMETRY 1: CPT

## 2019-03-18 PROCEDURE — 85025 COMPLETE CBC W/AUTO DIFF WBC: CPT | Performed by: INTERNAL MEDICINE

## 2019-03-18 RX ORDER — SEVELAMER CARBONATE 800 MG/1
800 TABLET, FILM COATED ORAL
Status: DISCONTINUED | OUTPATIENT
Start: 2019-03-18 | End: 2019-03-19 | Stop reason: HOSPADM

## 2019-03-18 RX ADMIN — HYDRALAZINE HYDROCHLORIDE 25 MG: 25 TABLET, FILM COATED ORAL at 22:16

## 2019-03-18 RX ADMIN — AMLODIPINE BESYLATE 5 MG: 5 TABLET ORAL at 11:51

## 2019-03-18 RX ADMIN — SEVELAMER CARBONATE 800 MG: 800 TABLET, FILM COATED ORAL at 18:15

## 2019-03-18 RX ADMIN — ENOXAPARIN SODIUM 30 MG: 30 INJECTION SUBCUTANEOUS at 13:34

## 2019-03-18 RX ADMIN — HYDRALAZINE HYDROCHLORIDE 25 MG: 25 TABLET, FILM COATED ORAL at 04:15

## 2019-03-18 RX ADMIN — FAMOTIDINE 20 MG: 20 TABLET, FILM COATED ORAL at 11:52

## 2019-03-18 RX ADMIN — METOPROLOL TARTRATE 50 MG: 50 TABLET ORAL at 11:52

## 2019-03-18 RX ADMIN — METOPROLOL TARTRATE 50 MG: 50 TABLET ORAL at 22:16

## 2019-03-18 RX ADMIN — HYDRALAZINE HYDROCHLORIDE 25 MG: 25 TABLET, FILM COATED ORAL at 13:34

## 2019-03-18 RX ADMIN — ATORVASTATIN CALCIUM 80 MG: 40 TABLET, FILM COATED ORAL at 22:15

## 2019-03-18 NOTE — DISCHARGE PLACEMENT REQUEST
"Erma Sands [phone]    Pt needs dialysis chair time set up in [facility].   Deandre Pascual (33 y.o. Male)     Date of Birth Social Security Number Address Home Phone MRN    1985  [address]  [facility] [id number] [phone] [id number]    Rastafari Marital Status          Other Single       Admission Date Admission Type Admitting Provider Attending Provider Department, Room/Bed    3/10/19 Emergency Damon Hargrove DO Robinson, Maurice S, DO [facility] 4C, 460/1    Discharge Date Discharge Disposition Discharge Destination                       Attending Provider:  Damon Hargrove DO    Allergies:  Penicillins    Isolation:  None   Infection:  None   Code Status:  CPR    Ht:  188 cm (74\")   Wt:  85.3 kg (188 lb)    Admission Cmt:  None   Principal Problem:  Acute respiratory failure with hypoxia and hypercapnia (CMS/HCC) [J96.01,J96.02]                 Active Insurance as of 3/10/2019     Primary Coverage     Payor Plan Insurance Group Employer/Plan Group    MEDICARE MEDICARE A & B      Payor Plan Address Payor Plan Phone Number Payor Plan Fax Number Effective Dates    [address] [phone]  12/1/2016 - None Entered    [facility] [address]       Subscriber Name Subscriber Birth Date Member ID       DEANDRE PASCUAL 1985 [id number]           Secondary Coverage     Payor Plan Insurance Group Employer/Plan Group    [place]      Payor Plan Address Payor Plan Phone Number Payor Plan Fax Number Effective Dates    [address] [address]   12/1/2016 - None Entered    [facility] [place] [address]       Subscriber Name Subscriber Birth Date Member ID       DEANDRE PASCUAL 1985 [id number]                 Emergency Contacts      (Rel.) Home Phone Work Phone Mobile Phone    Priscilla Castillo (Friend) [phone] -- --    STEFANIE LAMBERT (Sister) [phone] -- --    Pablo Ami (Mother) -- -- [phone]               History & Physical " "     Cisco Castillo MD at 3/10/2019  7:22 AM              [facility]  HISTORY AND PHYSICAL    Date of Admission: 3/10/2019  Primary Care Physician: Dai Boston APRN    Subjective     Chief Complaint: Acute respiratory failure    History of Present Illness  Patient is a 33-year-old  male with past medical history significant for type 1 diabetes complicated by peripheral neuropathy, hypertension, and reported stage III chronic kidney disease that presented to our hospital today secondary to shortness of breath and acute respiratory failure.  Family at bedside reports the patient was just discharged from [facility] yesterday on March 9, 2019 with a diagnosis of pneumonia.  They report that he was initially evaluated in their emergency room, was admitted for approximately 3 days, was placed on antibiotic therapy and breathing treatments for pneumonia, and was given quite a bit of intravenous fluids.  He was subsequently discharged home yesterday with a prescription for duo nebs.  Patient had progressively worsening shortness of breath even after discharge home.  He was using one of his family members home oxygen, and even despite supplemental oxygen continued to have worsening shortness of breath.  Family at bedside even reports that he was complaining of chest pain and chest pressure.  The presented to our facility for further workup and management, he was found to be in respiratory distress, also with altered mental status, decision was made to go ahead and proceed with intubation and mechanical ventilation.  Patient is currently intubated and sedated and unable to provide additional history.  Family at bedside reports the patient was supposed to be getting a cardiac workup performed in the near future, in addition to a bone marrow biopsy given that \"his white cells were up but his blood cells were down\".  He has not had any new " cough or congestion per their report.  No known fevers or chills.  They report that he was recently diagnosed with stage III chronic kidney disease.  They state that he has been a type I diabetic since he was 13 years of age.  They report that he has no sensation in his feet due to neuropathy, does have a wound on his left foot which is being managed in wound care.  They report that he smokes about a pack of cigarettes per day.    Review of Systems     Otherwise complete ROS reviewed and negative except as mentioned in the HPI.    Past Medical History:   Past Medical History:   Diagnosis Date   • Bleeding from the nose    • Chronic kidney disease    • Diabetic polyneuropathy associated with type 1 diabetes mellitus (CMS/[facility]) 12/18/2017   • GERD (gastroesophageal reflux disease)    • Hypertension    • Tobacco abuse disorder 12/18/2017   • Type 1 diabetes mellitus with severe nonproliferative retinopathy of both eyes (CMS/[facility]) 12/18/2017     Past Surgical History:  Past Surgical History:   Procedure Laterality Date   • EYE SURGERY Right    • HERNIA REPAIR     • OTHER SURGICAL HISTORY      insulin pump insertion   • SKIN GRAFT       Social History:  reports that he has been smoking.  he has never used smokeless tobacco. Drug use questions deferred to the physician.    Family History: family history includes Hypertension in his mother.       Allergies:  Allergies   Allergen Reactions   • Penicillins Unknown (See Comments)     Has been allergic since a child     Medications:  Prior to Admission medications    Medication Sig Start Date End Date Taking? Authorizing Provider   Alcohol Swabs (ALCOHOL WIPES) 70 % pads Use 4 x daily 12/18/17   Carlton Lewis MD   amLODIPine (NORVASC) 5 MG tablet Take 5 mg by mouth Daily.    ProviderGanesh MD   atorvastatin (LIPITOR) 80 MG tablet Take 80 mg by mouth Daily.    ProviderGanesh MD   Blood Glucose Monitoring Suppl w/Device kit USE AS INDICATED, ANY MONITOR  12/18/17   Carlton Lewis MD   Glucose Blood (BLOOD GLUCOSE TEST) strip Use 4 x daily, use any brand covered by insurance or same brand as before 12/18/17   Carlton Lewis MD   insulin aspart (NOVOLOG) 100 UNIT/ML injection USE AS DIRECTED UP  UNITS DAILY 1/25/19   Carlton Lewis MD   Lancet Devices (LANCING DEVICE) misc USE AS INDICATED TO CORRELATE WITH STRIPS AND METER 12/18/17   Carlton Lewis MD   Lancets 30G misc USE 4 X DAILY 12/18/17   Carlton Lewis MD   metoclopramide (REGLAN) 10 MG tablet Take 10 mg by mouth 3 (Three) Times a Day Before Meals.    ProviderGanesh MD   pantoprazole (PROTONIX) 40 MG EC tablet Take 40 mg by mouth 2 (Two) Times a Day.    ProviderGanesh MD   Urine Glucose-Ketones Test strip 1 each by In Vitro route As Needed (elevated glucose). 12/18/17   Carlton Lewis MD     Objective     Vital Signs: /76   Pulse 85   Temp 97.3 °F (36.3 °C) (Rectal)   Resp 26   Wt 82.3 kg (181 lb 7 oz)   SpO2 94%   BMI 23.30 kg/m²    Physical Exam   Constitutional:   Intubated and sedated (on mechanical ventilation); ill appearing   HENT:   Head: Normocephalic.   Mouth/Throat: No oropharyngeal exudate (ET tube in place).   Eyes: Pupils are equal, round, and reactive to light. No scleral icterus.   Neck: No tracheal deviation present.   Cardiovascular: Normal rate and regular rhythm.   Pulmonary/Chest: Effort normal. He has wheezes. He has rales.   On mechanical ventilation currently at 100% Fi02 and 8 PEEP   Abdominal: Soft.   Bowel sounds present   Musculoskeletal: He exhibits edema (pitting edema to bilateral lower extremities).   Neurological:   Intubated and sedated on propofol   Skin:   Left foot wound (distal; dorsum of left foot extending over middle toes); mild erythema but not fluctuance or purulent discharge noted   Vitals reviewed.      Results Reviewed:  Lab Results (last 24 hours)     Procedure  Component Value Units Date/Time    Blood Culture With EMERSON - Blood, Arm, Left [[id number]] Collected:  03/10/19 0555    Specimen:  Blood from Arm, Left Updated:  03/10/19 0721    Blood Culture With EMERSON - Blood, Arm, Left [[id number]] Collected:  03/10/19 0550    Specimen:  Blood from Arm, Left Updated:  03/10/19 0720    BNP [[id number]]  (Abnormal) Collected:  03/10/19 0555    Specimen:  Blood Updated:  03/10/19 0709     proBNP 18,300.0 pg/mL     Troponin [[id number]]  (Normal) Collected:  03/10/19 0555    Specimen:  Blood Updated:  03/10/19 0709     Troponin I 0.016 ng/mL     Comprehensive Metabolic Panel [[id number]]  (Abnormal) Collected:  03/10/19 0555    Specimen:  Blood Updated:  03/10/19 0705     Glucose 182 mg/dL      BUN 66 mg/dL      Creatinine 4.19 mg/dL      Sodium 140 mmol/L      Potassium 5.8 mmol/L      Chloride 108 mmol/L      CO2 23.0 mmol/L      Calcium 8.9 mg/dL      Total Protein 6.5 g/dL      Albumin 3.40 g/dL      ALT (SGPT) 34 U/L      AST (SGOT) 49 U/L      Alkaline Phosphatase 94 U/L      Total Bilirubin 0.5 mg/dL      eGFR Non African Amer 16 mL/min/1.73      Globulin 3.1 gm/dL      A/G Ratio 1.1 g/dL      BUN/Creatinine Ratio 15.8     Anion Gap 9.0 mmol/L     Urinalysis With Culture If Indicated - Urine, Catheter [[id number]]  (Abnormal) Collected:  03/10/19 0650    Specimen:  Urine, Catheter Updated:  03/10/19 0703     Color, UA Yellow     Appearance, UA Clear     pH, UA 5.5     Specific Gravity, UA 1.019     Glucose,  mg/dL (2+)     Ketones, UA Negative     Bilirubin, UA Negative     Blood, UA Small (1+)     Protein, UA >=300 mg/dL (3+)     Leuk Esterase, UA Negative     Nitrite, UA Negative     Urobilinogen, UA 0.2 E.U./dL    Urinalysis, Microscopic Only - Urine, Catheter [[id number]]  (Abnormal) Collected:  03/10/19 0650    Specimen:  Urine, Catheter Updated:  03/10/19 0703     RBC, UA 13-20 /HPF      WBC, UA 3-5 /HPF      Bacteria, UA None Seen /HPF      Squamous Epithelial Cells, UA  0-2 /HPF      Hyaline Casts, UA 3-6 /LPF      Methodology Automated Microscopy    [place] Draw [[id number]] Collected:  03/10/19 0555    Specimen:  Blood Updated:  03/10/19 0700    Narrative:       The following orders were created for panel order [place] Draw.  Procedure                               Abnormality         Status                     ---------                               -----------         ------                     Light Blue Top[[id number]]                                   Final result               Green Top (Gel)[[id number]]                                  Final result               Lavender Top[[id number]]                                     Final result               Red Top[[id number]]                                          Final result                 Please view results for these tests on the individual orders.    Green Top (Gel) [[id number]] Collected:  03/10/19 0555    Specimen:  Blood Updated:  03/10/19 0700     Extra Tube Hold for add-ons.     Comment: Auto resulted.       Light Blue Top [[id number]] Collected:  03/10/19 0555    Specimen:  Blood Updated:  03/10/19 0700     Extra Tube hold for add-on     Comment: Auto resulted       Lavender Top [[id number]] Collected:  03/10/19 0555    Specimen:  Blood Updated:  03/10/19 0700     Extra Tube hold for add-on     Comment: Auto resulted       Red Top [[id number]] Collected:  03/10/19 0555    Specimen:  Blood Updated:  03/10/19 0700     Extra Tube Hold for add-ons.     Comment: Auto resulted.       [place] Draw [[id number]] Collected:  03/10/19 0555    Specimen:  Blood Updated:  03/10/19 0700    Narrative:       The following orders were created for panel order [place] Draw.  Procedure                               Abnormality         Status                     ---------                               -----------         ------                     Light Blue Top[[id number]]                                   Final result               Green Top  (Gel)[[id number]]                                  Final result               Lavender Top[[id number]]                                     Final result               Red Top[[id number]]                                                                       Please view results for these tests on the individual orders.    Light Blue Top [[id number]] Collected:  03/10/19 0555    Specimen:  Blood Updated:  03/10/19 0700     Extra Tube hold for add-on     Comment: Auto resulted       Green Top (Gel) [[id number]] Collected:  03/10/19 0555    Specimen:  Blood Updated:  03/10/19 0700     Extra Tube Hold for add-ons.     Comment: Auto resulted.       Lavender Top [[id number]] Collected:  03/10/19 0555    Specimen:  Blood Updated:  03/10/19 0700     Extra Tube hold for add-on     Comment: Auto resulted       Magnesium [[id number]]  (Normal) Collected:  03/10/19 0555    Specimen:  Blood Updated:  03/10/19 0658     Magnesium 2.2 mg/dL     Phosphorus [[id number]]  (Abnormal) Collected:  03/10/19 0555    Specimen:  Blood Updated:  03/10/19 0658     Phosphorus 6.4 mg/dL     Lactic Acid, Plasma [[id number]]  (Normal) Collected:  03/10/19 0555    Specimen:  Blood Updated:  03/10/19 0655     Lactate 0.9 mmol/L     Protime-INR [[id number]]  (Normal) Collected:  03/10/19 0555    Specimen:  Blood Updated:  03/10/19 0647     Protime 12.6 Seconds      INR 0.92    aPTT [[id number]]  (Normal) Collected:  03/10/19 0555    Specimen:  Blood Updated:  03/10/19 0647     PTT 30.7 seconds     CBC & Differential [[id number]] Collected:  03/10/19 0555    Specimen:  Blood Updated:  03/10/19 0639    Narrative:       The following orders were created for panel order CBC & Differential.  Procedure                               Abnormality         Status                     ---------                               -----------         ------                     CBC Auto Differential[[id number]]        Abnormal            Final result                 Please view results  for these tests on the individual orders.    CBC Auto Differential [[id number]]  (Abnormal) Collected:  03/10/19 0555    Specimen:  Blood Updated:  03/10/19 0639     WBC 11.35 10*3/mm3      RBC 3.79 10*6/mm3      Hemoglobin 11.4 g/dL      Hematocrit 35.3 %      MCV 93.1 fL      MCH 30.1 pg      MCHC 32.3 g/dL      RDW 17.2 %      RDW-SD 58.0 fl      MPV 10.4 fL      Platelets 234 10*3/mm3      Neutrophil % 60.0 %      Lymphocyte % 31.5 %      Monocyte % 4.7 %      Eosinophil % 2.5 %      Basophil % 1.0 %      Immature Grans % 0.3 %      Neutrophils, Absolute 6.82 10*3/mm3      Lymphocytes, Absolute 3.58 10*3/mm3      Monocytes, Absolute 0.53 10*3/mm3      Eosinophils, Absolute 0.28 10*3/mm3      Basophils, Absolute 0.11 10*3/mm3      Immature Grans, Absolute 0.03 10*3/mm3      nRBC 0.0 /100 WBC     Blood Gas, Arterial [[id number]]  (Abnormal) Collected:  03/10/19 0618    Specimen:  Arterial Blood Updated:  03/10/19 0621     Site Right Radial     Ji's Test Positive     pH, Arterial 7.226 pH units      Comment: 84 Value below reference range        pCO2, Arterial 53.0 mm Hg      Comment: 83 Value above reference range        pO2, Arterial 77.2 mm Hg      Comment: 84 Value below reference range        HCO3, Arterial 22.0 mmol/L      Base Excess, Arterial -5.7 mmol/L      Comment: 84 Value below reference range        O2 Saturation, Arterial 93.1 %      Comment: 84 Value below reference range        Temperature 37.0 C      Barometric Pressure for Blood Gas 751 mmHg      Modality Ventilator     FIO2 100 %      Ventilator Mode AC     Set Tidal Volume 550     Set Mech Resp Rate 16.0     PEEP 8.0     Collected by [id number]     Comment: Meter: [id number]     :  [id number]       POC Glucose Once [[id number]]  (Abnormal) Collected:  03/10/19 0550    Specimen:  Blood Updated:  03/10/19 0602     Glucose 197 mg/dL      Comment: : [id number] Nancy JamiMeter ID: [id number]           Imaging Results (last 24 hours)      Procedure Component Value Units Date/Time    XR Chest 1 View [[id number]] Updated:  03/10/19 0606        I have personally reviewed and interpreted the radiology studies and ECG obtained at time of admission.     Assessment / Plan     Assessment:   Active Hospital Problems    Diagnosis   • Acute respiratory failure with hypoxia (CMS/HCC)     And hypercapnia     • Acute congestive heart failure (CMS/HCC)   • Acute kidney injury (CMS/HCC)   • Hyperkalemia   • Anemia   • History of recent pneumonia   • Type 1 diabetes, uncontrolled, with gastroparesis (CMS/HCC)   • Diabetic polyneuropathy associated with type 1 diabetes mellitus (CMS/HCC)   • Tobacco abuse disorder     Plan:   1.  CT Chest (noncontrast) ordered in ED - pending  2.  ICU admission  3.  Would like to start IV diuretics however BUN 66 and Cr 4.19; obtain records from [facility] including lab studies to assess baseline renal function - patient discharged yesterday  4.  Nephrology consultation  5.  Pulmonary consultation  6.  STAT Echo  7.  Renal US  8.  Empiric IV Vanco and Merrem   9.  Blood and respiratory cultures  10.  Trend cardiac markers  11.  Calcium gluconate, insulin, sodium bicarbonate; repeat BMP this PM  12.  IV Pepcid/Lovenox PPx  13.  Check procalcitonin; urine Legionella and Strep pneumo Ag  14.  Scheduled Duonebs  15.  Patient will need close BS monitoring; type I DM  16.  Critically ill; workup ongoing    Cisco Castillo MD   03/10/19   7:33 AM            Electronically signed by Cisco Castillo MD at 3/10/2019  8:22 AM       Hospital Medications (active)       Dose Frequency Start End    amLODIPine (NORVASC) tablet 5 mg 5 mg Daily 3/14/2019     Sig - Route: Take 1 tablet by mouth Daily. - Oral    atorvastatin (LIPITOR) tablet 80 mg 80 mg Nightly 3/14/2019     Sig - Route: Take 2 tablets by mouth Every Night. - Oral    dextrose (D50W) 25 g/ 50mL Intravenous Solution 25 g 25 g Every 15 Minutes PRN 3/10/2019     Sig - Route: Infuse  50 mL into a venous catheter Every 15 (Fifteen) Minutes As Needed for Low Blood Sugar (Blood Sugar Less Than 70). - Intravenous    dextrose (GLUTOSE) oral gel 15 g 15 g Every 15 Minutes PRN 3/10/2019     Sig - Route: Take 15 g by mouth Every 15 (Fifteen) Minutes As Needed for Low Blood Sugar (Blood sugar less than 70). - Oral    enoxaparin (LOVENOX) syringe 30 mg 30 mg Every 24 Hours 3/10/2019     Sig - Route: Inject 0.3 mL under the skin into the appropriate area as directed Daily. - Subcutaneous    famotidine (PEPCID) tablet 20 mg 20 mg Daily 3/18/2019     Sig - Route: Take 1 tablet by mouth Daily. - Oral    glucagon (human recombinant) (GLUCAGEN DIAGNOSTIC) injection 1 mg 1 mg As Needed 3/10/2019     Sig - Route: Inject 1 mg under the skin into the appropriate area as directed As Needed (Blood Glucose Less Than 70). - Subcutaneous    heparin (porcine) injection 3,600 Units 3,600 Units Once 3/13/2019     Sig - Route: 3.6 mL by Intracatheter route 1 (One) Time. - Intracatheter    heparin (porcine) injection 3,600 Units 3,600 Units Once 3/14/2019     Sig - Route: 3.6 mL by Intracatheter route 1 (One) Time. - Intracatheter    heparin (porcine) injection 4,000 Units 4,000 Units Once 3/15/2019     Sig - Route: 4 mL by Intracatheter route 1 (One) Time. - Intracatheter    hydrALAZINE (APRESOLINE) injection 10 mg 10 mg Every 6 Hours PRN 3/14/2019     Sig - Route: Infuse 0.5 mL into a venous catheter Every 6 (Six) Hours As Needed for High Blood Pressure. - Intravenous    hydrALAZINE (APRESOLINE) tablet 25 mg 25 mg Every 8 Hours Scheduled 3/17/2019     Sig - Route: Take 1 tablet by mouth Every 8 (Eight) Hours. - Oral    ipratropium-albuterol (DUO-NEB) nebulizer solution 3 mL 3 mL Every 4 Hours PRN 3/15/2019     Sig - Route: Take 3 mL by nebulization Every 4 (Four) Hours As Needed for Shortness of Air. - Nebulization    labetalol (NORMODYNE,TRANDATE) injection 10 mg 10 mg Every 4 Hours PRN 3/10/2019     Sig - Route: Infuse 2  "mL into a venous catheter Every 4 (Four) Hours As Needed for High Blood Pressure. - Intravenous    metoprolol tartrate (LOPRESSOR) tablet 50 mg 50 mg Every 12 Hours Scheduled 3/17/2019     Sig - Route: Take 1 tablet by mouth Every 12 (Twelve) Hours. - Oral    Morphine sulfate (PF) injection 2 mg 2 mg Every 4 Hours PRN 3/13/2019 3/23/2019    Sig - Route: Infuse 1 mL into a venous catheter Every 4 (Four) Hours As Needed for Moderate Pain . - Intravenous    Linked Group 1:  \"And\" Linked Group Details        naloxone (NARCAN) injection 0.4 mg 0.4 mg Every 5 Minutes PRN 3/13/2019     Sig - Route: Infuse 1 mL into a venous catheter Every 5 (Five) Minutes As Needed for Respiratory Depression. - Intravenous    Linked Group 1:  \"And\" Linked Group Details        ondansetron (ZOFRAN) injection 4 mg 4 mg Every 6 Hours PRN 3/10/2019     Sig - Route: Infuse 2 mL into a venous catheter Every 6 (Six) Hours As Needed for Nausea or Vomiting. - Intravenous    sodium chloride 0.9 % bolus 100 mL 100 mL As Needed 3/15/2019     Sig - Route: Infuse 100 mL into a venous catheter As Needed (hypotension). - Intravenous    sodium chloride 0.9 % flush 10 mL 10 mL As Needed 3/10/2019     Sig - Route: Infuse 10 mL into a venous catheter As Needed for Line Care. - Intravenous    sodium chloride 0.9 % flush 3 mL 3 mL Every 12 Hours Scheduled 3/10/2019     Sig - Route: Infuse 3 mL into a venous catheter Every 12 (Twelve) Hours. - Intravenous    sodium chloride 0.9 % flush 3-10 mL 3-10 mL As Needed 3/10/2019     Sig - Route: Infuse 3-10 mL into a venous catheter As Needed for Line Care. - Intravenous          Operative/Procedure Notes (last 24 hours) (Notes from 3/17/2019  9:55 AM through 3/18/2019  9:55 AM)     No notes of this type exist for this encounter.           Physician Progress Notes (last 24 hours) (Notes from 3/17/2019  9:55 AM through 3/18/2019  9:55 AM)      Aron Lau MD at 3/17/2019  1:36 PM              PULMONARY AND " CRITICAL CARE PROGRESS NOTE - [facility]    Patient: Jose Shah  1985   MR# [id number]   Acct# [id number]  03/17/19   1:36 PM  Referring Provider: Darius Sharma DO    Chief Complaint: Acute respiratory failure    Interval history: The patient is breathing comfortably on room air with O2 sats in the 90s.  Meds:    amLODIPine 5 mg Oral Daily   atorvastatin 80 mg Oral Nightly   enoxaparin 30 mg Subcutaneous Q24H   [START ON 3/18/2019] famotidine 20 mg Oral Daily   heparin (porcine) 3,600 Units Intracatheter Once   heparin (porcine) 3,600 Units Intracatheter Once   heparin (porcine) 4,000 Units Intracatheter Once   hydrALAZINE 25 mg Oral Q8H   metoprolol tartrate 50 mg Oral Q12H   sodium chloride 3 mL Intravenous Q12H        Review of Systems:   Review of Systems   Constitutional: Negative for chills and fever.   Respiratory: Positive for shortness of breath. Negative for cough.    Cardiovascular: Negative for chest pain.   Gastrointestinal: Positive for diarrhea. Negative for vomiting.     Physical Exam:  SpO2 Percentage    03/17/19 0739 03/17/19 0958 03/17/19 1134   SpO2: 96% 97% 97%     Temp:  [97.9 °F (36.6 °C)-98.2 °F (36.8 °C)] 98 °F (36.7 °C)  Heart Rate:  [78-90] 87  Resp:  [18-20] 18  BP: (126-166)/(72-94) 150/85    Intake/Output Summary (Last 24 hours) at 3/17/2019 1336  Last data filed at 3/17/2019 0943  Gross per 24 hour   Intake 480 ml   Output 400 ml   Net 80 ml     Physical Exam   Constitutional: He appears well-developed and well-nourished. Nasal cannula in place.   HENT:   Head: Normocephalic and atraumatic.   Eyes: Conjunctivae and EOM are normal. Pupils are equal, round, and reactive to light. No scleral icterus.   Neck: Normal range of motion. Neck supple.   Cardiovascular: Normal rate, regular rhythm and normal heart sounds. Exam reveals no friction rub.   No murmur heard.  Pulmonary/Chest: Effort normal. No respiratory distress. He has no wheezes. He has no rales.   He has  improved air movement on chest exam today.   Abdominal: Soft. Bowel sounds are normal. He exhibits no distension. There is no tenderness.   Musculoskeletal: Normal range of motion. He exhibits edema.   Neurological: He is alert.   Skin: Skin is warm and dry.   Psychiatric: He has a normal mood and affect. His behavior is normal.   Nursing note and vitals reviewed.    Laboratory Data:  Results from last 7 days   Lab Units 03/17/19  0504 03/16/19  0514 03/15/19  0410   WBC 10*3/mm3 7.33 8.17 9.02   HEMOGLOBIN g/dL 10.1* 10.8* 10.3*   PLATELETS 10*3/mm3 241 267 232     Results from last 7 days   Lab Units 03/17/19  0504 03/16/19  1507 03/16/19  0514   SODIUM mmol/L 139 136 136   POTASSIUM mmol/L 3.8 4.5 4.9   BUN mg/dL 53* 47* 38*   CREATININE mg/dL 4.07* 3.74* 3.18*     Results from last 7 days   Lab Units 03/14/19  0300 03/13/19  0224 03/12/19  0236   PH, ARTERIAL pH units 7.366 7.423 7.407   PCO2, ARTERIAL mm Hg 38.6 35.8 34.3*   PO2 ART mm Hg 96.1 76.2* 64.8*   FIO2 % 60 35 35     Blood Culture   Date Value Ref Range Status   03/10/2019 No growth at 5 days  Final   03/10/2019 No growth at 5 days  Final     Respiratory Culture   Date Value Ref Range Status   03/10/2019 Light growth (2+) Normal Respiratory Althea  Final     Recent films:  No radiology results for the last day  Films reviewed personally by me.  My interpretation: None today  Pulmonary Assessment:  1. Acute respiratory failure requiring mechanical ventilation secondary to bilateral lung infiltrates, extubated 3/14/19  2. Poorly controlled diabetes  3. Acute on chronic renal failure  4. Suspected congestive heart failure or fluid overload related to acute on chronic renal failure, with elevated BNP  5. Bilateral pleural effusions likely related to #1  6. Possible incompletely controlled pneumonia or new pneumonia or recent pneumonia with resolution but persistent opacities which have not had time to clear  7. History of vitamin D  deficiency  8. Hyperkalemia likely related to renal failure  9. Metabolic acidosis    Recommend:   · He is alright for discharge from a pulmonary standpoint.  I think the effusions seen on his recent chest x-ray were almost certainly due to volume overload and a follow-up chest x-ray can be performed by his primary care provider as an outpatient.    Electronically signed by Aron Lau MD, 03/17/19, 1:36 PM          Electronically signed by Aron Lau MD at 3/17/2019  2:46 PM     Marcelo Snyder MD at 3/17/2019  1:09 PM          Nephrology ([facility]) progress Note      Patient:  Jose Shah  YOB: 1985  Date of Service: 3/17/2019  MRN: [id number]   Acct: [id number]   Primary Care Physician: Dai Boston APRN  Advance Directive:   Code Status and Medical Interventions:   Ordered at: 03/10/19 0743     Code Status:    CPR     Medical Interventions (Level of Support Prior to Arrest):    Full     Admit Date: 3/10/2019       Hospital Day: 7  Referring Provider: No ref. provider found      Patient Seen, Chart, Consults, Notes, Labs, Radiology studies reviewed.    Chief complaint: Abnormal labs.    Subjective:  Jose Shah is a 33 y.o. male  whom we were consulted for acute kidney injury/chronic kidney disease.  Patient has stage IV chronic kidney disease and sees nephrologist in [place].  He also has insulin-dependent type 1 diabetes/juvenile diabetes.  He presented with increasing shortness of breath, dyspnea on exertion and acute respiratory failure/intubated.  He was diagnosed with pneumonia and COPD exacerbation.  He is chronic cigarette smoker and continues to smoke cigarettes.  Hospital course remarkable for acute kidney injury/worsening of renal function, necessitating initiation of dialysis.  He has received several dialysis treatments and was successfully extubated, now transferred to regular floor.    This morning he is sitting up and feeling much  better.  He was up and walking around.    Allergies:  Penicillins    Home Meds:  Medications Prior to Admission   Medication Sig Dispense Refill Last Dose   • CloNIDine (CATAPRES) 0.1 MG tablet Take 0.1 mg by mouth Every 8 (Eight) Hours.      • famotidine (PEPCID) 20 MG tablet Take 20 mg by mouth 2 (Two) Times a Day.      • hydrALAZINE (APRESOLINE) 25 MG tablet Take 25 mg by mouth Every 6 (Six) Hours As Needed.      • metoprolol tartrate (LOPRESSOR) 50 MG tablet Take 50 mg by mouth 2 (Two) Times a Day.      • Alcohol Swabs (ALCOHOL WIPES) 70 % pads Use 4 x daily 120 each 11 Taking   • amLODIPine (NORVASC) 5 MG tablet Take 5 mg by mouth Daily.   2/28/2019 at Unknown time   • atorvastatin (LIPITOR) 80 MG tablet Take 80 mg by mouth Daily.   2/28/2019 at Unknown time   • Blood Glucose Monitoring Suppl w/Device kit USE AS INDICATED, ANY MONITOR 1 each 1 Taking   • Glucose Blood (BLOOD GLUCOSE TEST) strip Use 4 x daily, use any brand covered by insurance or same brand as before 120 each 11 Taking   • insulin aspart (NOVOLOG) 100 UNIT/ML injection USE AS DIRECTED UP  UNITS DAILY 5 each 5 3/1/2019 at Unknown time   • Lancet Devices (LANCING DEVICE) misc USE AS INDICATED TO CORRELATE WITH STRIPS AND METER 1 each 1 Taking   • Lancets 30G misc USE 4 X DAILY 120 each 11 Taking   • metoclopramide (REGLAN) 10 MG tablet Take 10 mg by mouth 3 (Three) Times a Day Before Meals.   2/28/2019 at Unknown time   • pantoprazole (PROTONIX) 40 MG EC tablet Take 40 mg by mouth 2 (Two) Times a Day.   2/28/2019 at Unknown time   • Urine Glucose-Ketones Test strip 1 each by In Vitro route As Needed (elevated glucose). 100 each 11 Taking       Medicines:  Current Facility-Administered Medications   Medication Dose Route Frequency Provider Last Rate Last Dose   • amLODIPine (NORVASC) tablet 5 mg  5 mg Oral Daily Darius Sharma DO   5 mg at 03/17/19 0907   • atorvastatin (LIPITOR) tablet 80 mg  80 mg Oral Nightly Darius Sharma DO   80 mg  at 03/16/19 2024   • dextrose (D50W) 25 g/ 50mL Intravenous Solution 25 g  25 g Intravenous Q15 Min PRN Cisco Castillo MD       • dextrose (GLUTOSE) oral gel 15 g  15 g Oral Q15 Min PRN Cisco Castillo MD       • enoxaparin (LOVENOX) syringe 30 mg  30 mg Subcutaneous Q24H Cisco Castillo MD   30 mg at 03/17/19 0907   • [START ON 3/18/2019] famotidine (PEPCID) tablet 20 mg  20 mg Oral Daily Darius Sharma DO       • glucagon (human recombinant) (GLUCAGEN DIAGNOSTIC) injection 1 mg  1 mg Subcutaneous PRN Cisco Castillo MD       • heparin (porcine) injection 3,600 Units  3,600 Units Intracatheter Once Ali, MD Marcelo       • heparin (porcine) injection 3,600 Units  3,600 Units Intracatheter Once Ali, MD Marcelo       • heparin (porcine) injection 4,000 Units  4,000 Units Intracatheter Once Ali, MD Marcelo       • hydrALAZINE (APRESOLINE) injection 10 mg  10 mg Intravenous Q6H PRN Darius Sharma DO   10 mg at 03/17/19 0655   • hydrALAZINE (APRESOLINE) tablet 25 mg  25 mg Oral Q8H Darius Sharma DO       • ipratropium-albuterol (DUO-NEB) nebulizer solution 3 mL  3 mL Nebulization Q4H PRN Mariia Santiago APRN       • labetalol (NORMODYNE,TRANDATE) injection 10 mg  10 mg Intravenous Q4H PRN Cisco Castillo MD   10 mg at 03/14/19 1222   • metoprolol tartrate (LOPRESSOR) tablet 50 mg  50 mg Oral Q12H Darius Sharma DO   50 mg at 03/17/19 1257   • Morphine sulfate (PF) injection 2 mg  2 mg Intravenous Q4H PRN Manjit Avila DO   2 mg at 03/16/19 2023    And   • naloxone (NARCAN) injection 0.4 mg  0.4 mg Intravenous Q5 Min PRN Manjit Avila DO       • ondansetron (ZOFRAN) injection 4 mg  4 mg Intravenous Q6H PRN Cisco Castillo MD   4 mg at 03/14/19 1615   • sodium chloride 0.9 % bolus 100 mL  100 mL Intravenous PRN Marcelo Snyder MD       • sodium chloride 0.9 % flush 10 mL  10 mL Intravenous PRN Ger Chino MD       • sodium chloride 0.9 % flush 3 mL   3 mL Intravenous Q12H Cisco Castillo MD   3 mL at 03/17/19 0907   • sodium chloride 0.9 % flush 3-10 mL  3-10 mL Intravenous PRN Cisco Castillo MD           Past Medical History:  Past Medical History:   Diagnosis Date   • Bleeding from the nose    • Chronic kidney disease    • Diabetic polyneuropathy associated with type 1 diabetes mellitus (CMS/Formerly [facility]) 12/18/2017   • GERD (gastroesophageal reflux disease)    • Hypertension    • Tobacco abuse disorder 12/18/2017   • Type 1 diabetes mellitus with severe nonproliferative retinopathy of both eyes (CMS/Formerly [facility]) 12/18/2017       Past Surgical History:  Past Surgical History:   Procedure Laterality Date   • EYE SURGERY Right    • HERNIA REPAIR     • INSERTION HEMODIALYSIS CATHETER Right 3/13/2019    Procedure: HEMODIALYSIS CATHETER INSERTION;  Surgeon: Manjit Avila DO;  Location: [place] [address];  Service: Vascular   • OTHER SURGICAL HISTORY      insulin pump insertion   • SKIN GRAFT         Family History  Family History   Problem Relation Age of Onset   • Hypertension Mother        Social History  Social History     Socioeconomic History   • Marital status: Single     Spouse name: Not on file   • Number of children: Not on file   • Years of education: Not on file   • Highest education level: Not on file   Social Needs   • Financial resource strain: Not on file   • Food insecurity - worry: Not on file   • Food insecurity - inability: Not on file   • Transportation needs - medical: Not on file   • Transportation needs - non-medical: Not on file   Occupational History   • Not on file   Tobacco Use   • Smoking status: Current Every Day Smoker   • Smokeless tobacco: Never Used   Substance and Sexual Activity   • Alcohol use: Not on file   • Drug use: Defer   • Sexual activity: Defer   Other Topics Concern   • Not on file   Social History Narrative   • Not on file         Review of Systems:  History obtained from chart review and the patient  General ROS:  "No fever or chills  Respiratory ROS: positive for - shortness of breath  Cardiovascular ROS: no chest pain or dyspnea on exertion  Gastrointestinal ROS: No abdominal pain or melena  Genito-Urinary ROS: No dysuria or hematuria  14 point ROS reviewed with the patient and negative except as noted above and in the HPI unless unable to obtain.    Objective:  /85 (BP Location: Right arm, Patient Position: Sitting)   Pulse 87   Temp 98 °F (36.7 °C) (Oral)   Resp 18   Ht 188 cm (74\")   Wt 82.1 kg (181 lb)   SpO2 97%   BMI 23.24 kg/m²      Intake/Output Summary (Last 24 hours) at 3/17/2019 1309  Last data filed at 3/17/2019 0943  Gross per 24 hour   Intake 480 ml   Output 400 ml   Net 80 ml     General: awake/alert   HEENT: Normocephalic and American  Neck: Supple with no JVD or carotid bruits.  Chest:  clear to auscultation bilaterally without respiratory distress  CVS: regular rate and rhythm  Abdominal: soft, nontender, normal bowel sounds  Extremities: no cyanosis or edema  Skin: warm and dry without rash      Labs:  Results from last 7 days   Lab Units 03/17/19  0504 03/16/19  0514 03/15/19  0410   WBC 10*3/mm3 7.33 8.17 9.02   HEMOGLOBIN g/dL 10.1* 10.8* 10.3*   HEMATOCRIT % 30.4* 33.0* 31.7*   PLATELETS 10*3/mm3 241 267 232         Results from last 7 days   Lab Units 03/17/19  0504 03/16/19  1507 03/16/19  0514 03/15/19  0410   SODIUM mmol/L 139 136 136 141   POTASSIUM mmol/L 3.8 4.5 4.9 4.5   CHLORIDE mmol/L 102 96* 98 104   CO2 mmol/L 29.0 23.0* 13.0* 24.0   BUN mg/dL 53* 47* 38* 35*   CREATININE mg/dL 4.07* 3.74* 3.18* 3.04*   CALCIUM mg/dL 8.4 8.3* 8.5 8.3*   BILIRUBIN mg/dL 0.4  --  0.5 0.6   ALK PHOS U/L 82  --  93 85   ALT (SGPT) U/L 20  --  21 23   AST (SGOT) U/L 20  --  26 25   GLUCOSE mg/dL 100 349* 261* 168*           Radiology:   Imaging Results (last 72 hours)     Procedure Component Value Units Date/Time    IR Insert Tunneled CV Catheter Without Port 5 Plus [[id number]] Collected:  03/14/19 " 0703     Updated:  03/15/19 1317    Narrative:       Performed by Dr. Avila. Please see procedure note.  This report was finalized on 03/15/2019 13:14 by Dr. Manjit Avila MD.    FL C Arm During Surgery [[id number]] Collected:  03/14/19 0703     Updated:  03/15/19 1317    Narrative:       Performed by Dr. Avila. Please see procedure note.  This report was finalized on 03/15/2019 13:14 by Dr. Manjit Avila MD.    XR Chest 1 View [[id number]] Collected:  03/14/19 0714     Updated:  03/14/19 0718    Narrative:       EXAMINATION:   XR CHEST 1 VW-  3/14/2019 7:14 AM CDT     HISTORY: Respiratory failure     Frontal upright radiograph of the chest 3/14/2019 4:09 AM CDT     COMPARISON: March 13, 2019.     FINDINGS:   Moderate to large bilateral pleural effusions are present. The lung  bases are obscured by the large pleural effusions.. Cardiac silhouettes  enlarged. Right internal jugular double lumen catheter satisfactorily  positioned. Endotracheal tube is noted..      The osseous structures and surrounding soft tissues demonstrate no acute  abnormality.       Impression:       1. Large bilateral pleural effusions. There is no improvement from March 13, 2019.        This report was finalized on 03/14/2019 07:15 by Dr. Natalio Uriostegui MD.          Culture:  No components found for: WOUNDCUL, 3  No components found for: CSFCUL, 3  No components found for: BC, 3  No components found for: URINECUL, 3      Assessment   1.  Acute kidney injury/ATN/dialysis dependent.  2.  Stage IV chronic kidney disease baseline.  3.  Diabetic nephropathy..  4.  Type 1 diabetes.  5.  Acute respiratory failure resolved.  6.  Anemia of chronic kidney disease.  7.  Fluid overload/resolving.  8.  COPD with active tobacco use    Plan:  1.  Resume hemodialysis treatment in the morning  2.  Outpatient dialysis placement.  3.  Continue Procrit injection with dialysis.      Marcelo Snyder MD  3/17/2019  1:09 PM            Electronically signed  by Marcelo Snyder MD at 3/17/2019  1:10 PM       Consult Notes (last 24 hours) (Notes from 3/17/2019  9:56 AM through 3/18/2019  9:56 AM)     No notes of this type exist for this encounter.      Hepatitis Panel, Acute [[id number]] (Order [id number])   Order   Date: 3/13/2019 Department: [address] Released By: Kalin De León RN (auto-released) Authorizing: Marcelo Snyder MD   Reprint Order Requisition     Hepatitis Panel, Acute (Order #[id number]) on 3/13/19       Hepatitis Panel, Acute   Order: [id number]   Status:  Final result   Visible to patient:  No (Not Released)    Ref Range & Units 5d ago   HCV S/C Ratio 0.00 - 0.99 0.01    Hepatitis C Ab Negative Negative    Hep A IgM Negative Negative    Hep B C IgM Negative Negative    Hepatitis B Surface Ag Negative Negative    Resulting Agency  [facility]         Specimen Collected: 03/13/19 14:58 Last Resulted: 03/13/19 16:26        Lab Flowsheet      Order Details      View Encounter      Lab and Collection Details      Routing      Result History               3/13/2019  4:26 PM     Component Value Flag Ref Range Units Status   HCV S/C Ratio 0.01   0.00 - 0.99  Final   Hepatitis C Ab Negative   Negative  Final   Hep A IgM Negative   Negative  Final   Hep B C IgM Negative   Negative  Final   Hepatitis B Surface Ag Negative   Negative  Final   Lab and Collection     Hepatitis Panel, Acute (Order: [id number]) - 3/13/2019   Routing History     Priority Sent On From To Message Type    3/10/2019 11:15 AM Facundo Ellington MD Thompson, Benjamin H, MD Results   Other Results from 3/10/2019      Comprehensive Metabolic Panel  Final result 3/18/2019    CBC Auto Differential  Final result 3/18/2019    POC Glucose Once  Final result 3/18/2019    POC Glucose Once  Final result 3/17/2019    POC Glucose Once  Final result 3/17/2019    POC Glucose Once  Final result 3/17/2019    POC Glucose Once  Final result 3/17/2019    POC Glucose Once  Final result 3/17/2019    POC Glucose  Once  Final result 3/17/2019    Comprehensive Metabolic Panel  Final result 3/17/2019    CBC Auto Differential  Final result 3/17/2019    POC Glucose Once  Final result 3/17/2019    POC Glucose Once  Final result 3/16/2019    POC Glucose Once  Final result 3/16/2019    Basic Metabolic Panel  Final result 3/16/2019    POC Glucose Once  Final result 3/16/2019    POC Glucose Once  Final result 3/16/2019    Comprehensive Metabolic Panel  Final result 3/16/2019    CBC Auto Differential  Final result 3/16/2019    POC Glucose Once  Final result 3/15/2019   Important Suggestion Warning: Additional results from 3/10/2019 are available but are not displayed in this report.     Hepatitis B Surface Antibody [[id number]] (Order [id number])   Order   Date: 3/13/2019 Department: [address] Released By: Kalin De León RN (auto-released) Authorizing: Marcelo Snyder MD   Reprint Order Requisition     Hepatitis B Surface Antibody (Order #[id number]) on 3/13/19       Hepatitis B Surface Antibody   Order: [id number]   Status:  Final result   Visible to patient:  No (Not Released)    Ref Range & Units 5d ago   Hepatitis Bs Ab Index  14.80    Hep B S Ab Immune, Indeterminate Immune    Resulting Agency  [facility]         Specimen Collected: 03/13/19 14:58 Last Resulted: 03/13/19 16:26        Lab Flowsheet      Order Details      View Encounter      Lab and Collection Details      Routing      Result History               3/13/2019  4:26 PM     Component Value Flag Ref Range Units Status   Hepatitis Bs Ab Index 14.80     Final   Hep B S Ab Immune   Immune, Indeterminate  Final   Lab and Collection     Hepatitis B Surface Antibody (Order: [id number]) - 3/13/2019   Routing History     Priority Sent On From To Message Type    3/10/2019 11:15 AM Facundo Ellington MD Thompson, Cisco ROCHA MD Results   Other Results from 3/10/2019      Comprehensive Metabolic Panel  Final result 3/18/2019    CBC Auto Differential  Final result 3/18/2019     POC Glucose Once  Final result 3/18/2019    POC Glucose Once  Final result 3/17/2019    POC Glucose Once  Final result 3/17/2019    POC Glucose Once  Final result 3/17/2019    POC Glucose Once  Final result 3/17/2019    POC Glucose Once  Final result 3/17/2019    POC Glucose Once  Final result 3/17/2019    Comprehensive Metabolic Panel  Final result 3/17/2019    CBC Auto Differential  Final result 3/17/2019    POC Glucose Once  Final result 3/17/2019    POC Glucose Once  Final result 3/16/2019    POC Glucose Once  Final result 3/16/2019    Basic Metabolic Panel  Final result 3/16/2019    POC Glucose Once  Final result 3/16/2019    POC Glucose Once  Final result 3/16/2019    Comprehensive Metabolic Panel  Final result 3/16/2019    CBC Auto Differential  Final result 3/16/2019    POC Glucose Once  Final result 3/15/2019   Important Suggestion Warning: Additional results from 3/10/2019 are available but are not displayed in this report.

## 2019-03-18 NOTE — PROGRESS NOTES
Continued Stay Note  MARLIN Carvalho     Patient Name: Jose Shah  MRN: [id number]  Today's Date: 3/18/2019    Admit Date: 3/10/2019    Discharge Plan     Row Name 03/18/19 1000       Plan    Plan  Referral to [facility] to set up dialysis chair time    Patient/Family in Agreement with Plan  yes    Plan Comments  Pt currently down in dialysis at present time. Did send referral to [facility] to begin working on outpt. chair time. Will follow.         Discharge Codes    No documentation.             CYNTHIA Culver

## 2019-03-18 NOTE — PROGRESS NOTES
DISCUSSED WITH PT SIGNS/SYMPTOMS OF PNEUMONIA. ENCOURAGED TO TAKE ALL ANTIBIOTICS, STAY HYDRATED, EXERCISE, AND MAINTAIN A GOOD DIET. INFO LEFT AT BEDSIDE. PT DIDN'T HAVE ANY FURTHER QUESTIONS

## 2019-03-18 NOTE — PLAN OF CARE
Problem: Patient Care Overview  Goal: Plan of Care Review  Outcome: Ongoing (interventions implemented as appropriate)   03/17/19 9672   OTHER   Outcome Summary patient managing own insulin pump, tolerating regular carb diet with thin liquids, will continue to monitor   Plan of Care Review   Progress improving   Coping/Psychosocial   Plan of Care Reviewed With patient       Problem: Skin Injury Risk (Adult)  Goal: Identify Related Risk Factors and Signs and Symptoms  Outcome: Ongoing (interventions implemented as appropriate)    Goal: Skin Health and Integrity  Outcome: Ongoing (interventions implemented as appropriate)      Problem: Fall Risk (Adult)  Goal: Absence of Fall  Outcome: Ongoing (interventions implemented as appropriate)      Problem: Pneumonia (Adult)  Goal: Signs and Symptoms of Listed Potential Problems Will be Absent, Minimized or Managed (Pneumonia)  Outcome: Ongoing (interventions implemented as appropriate)      Problem: Renal Failure/Kidney Injury, Acute (Adult)  Goal: Signs and Symptoms of Listed Potential Problems Will be Absent, Minimized or Managed (Renal Failure/Kidney Injury, Acute)  Outcome: Ongoing (interventions implemented as appropriate)      Problem: Diabetes, Type 1 (Adult)  Goal: Signs and Symptoms of Listed Potential Problems Will be Absent, Minimized or Managed (Diabetes, Type 1)  Outcome: Ongoing (interventions implemented as appropriate)

## 2019-03-18 NOTE — PLAN OF CARE
Problem: Patient Care Overview  Goal: Plan of Care Review  Outcome: Ongoing (interventions implemented as appropriate)   03/18/19 8354   OTHER   Outcome Summary VSS. Patient has bilateral lower extremity edema. Patients BG was 164. Managing sugars with own insulin pump. Plan is to go to dialysis this AM. Cont to monitor.    Plan of Care Review   Progress no change   Coping/Psychosocial   Plan of Care Reviewed With patient       Problem: Skin Injury Risk (Adult)  Goal: Identify Related Risk Factors and Signs and Symptoms  Outcome: Ongoing (interventions implemented as appropriate)    Goal: Skin Health and Integrity  Outcome: Ongoing (interventions implemented as appropriate)      Problem: Fall Risk (Adult)  Goal: Absence of Fall  Outcome: Ongoing (interventions implemented as appropriate)      Problem: Pneumonia (Adult)  Goal: Signs and Symptoms of Listed Potential Problems Will be Absent, Minimized or Managed (Pneumonia)  Outcome: Ongoing (interventions implemented as appropriate)      Problem: Diabetes, Type 1 (Adult)  Goal: Signs and Symptoms of Listed Potential Problems Will be Absent, Minimized or Managed (Diabetes, Type 1)  Outcome: Ongoing (interventions implemented as appropriate)

## 2019-03-18 NOTE — PROGRESS NOTES
[facility] Medicine Services  INPATIENT PROGRESS NOTE    Length of Stay: 8  Date of Admission: 3/10/2019  Primary Care Physician: Dai Boston APRN    Subjective     Chief Complaint:     No complaint    HPI     Patient has no specific complaints today.  He is fully dressed and walking around in the halls and in his room.  He is anxious for discharge and to begin outpatient dialysis.  Information has been sent to [employer] in order to set up dialysis chair time.  The patient indicates to me that if he has no outpatient dialysis chair time schedule by tomorrow afternoon that he is likely to leave A.    Review of Systems     All pertinent negatives and positives are as above. All other systems have been reviewed and are negative unless otherwise stated.     Objective    Temp:  [98 °F (36.7 °C)-98.7 °F (37.1 °C)] 98 °F (36.7 °C)  Heart Rate:  [80-93] 83  Resp:  [16-18] 18  BP: (137-163)/() 137/76    Lab Results (last 24 hours)     Procedure Component Value Units Date/Time    POC Glucose Once [[id number]]  (Abnormal) Collected:  03/18/19 1625    Specimen:  Blood Updated:  03/18/19 1653     Glucose 212 mg/dL      Comment: : [id number] Miguelito [employer]eter ID: [id number]       POC Glucose Once [[id number]]  (Normal) Collected:  03/18/19 1123    Specimen:  Blood Updated:  03/18/19 1138     Glucose 86 mg/dL      Comment: : [id number] Miguelito PamelaMeter ID: [id number]       Comprehensive Metabolic Panel [[id number]]  (Abnormal) Collected:  03/18/19 0432    Specimen:  Blood Updated:  03/18/19 0646     Glucose 170 mg/dL      BUN 59 mg/dL      Creatinine 3.95 mg/dL      Sodium 139 mmol/L      Potassium 4.6 mmol/L      Chloride 100 mmol/L      CO2 28.0 mmol/L      Calcium 8.4 mg/dL      Total Protein 5.9 g/dL      Albumin 2.90 g/dL      ALT (SGPT) 18 U/L      AST (SGOT) 25 U/L      Alkaline Phosphatase 93 U/L      Total Bilirubin 0.5 mg/dL      eGFR Non African Amer 18  mL/min/1.73      Globulin 3.0 gm/dL      A/G Ratio 1.0 g/dL      BUN/Creatinine Ratio 14.9     Anion Gap 11.0 mmol/L     Narrative:       GFR Normal >60  Chronic Kidney Disease <60  Kidney Failure <15    CBC & Differential [[id number]] Collected:  03/18/19 0432    Specimen:  Blood Updated:  03/18/19 0457    Narrative:       The following orders were created for panel order CBC & Differential.  Procedure                               Abnormality         Status                     ---------                               -----------         ------                     CBC Auto Differential[[id number]]        Abnormal            Final result                 Please view results for these tests on the individual orders.    CBC Auto Differential [[id number]]  (Abnormal) Collected:  03/18/19 0432    Specimen:  Blood Updated:  03/18/19 0457     WBC 6.19 10*3/mm3      RBC 3.77 10*6/mm3      Hemoglobin 11.5 g/dL      Hematocrit 34.6 %      MCV 91.8 fL      MCH 30.5 pg      MCHC 33.2 g/dL      RDW 14.8 %      RDW-SD 49.9 fl      MPV 9.2 fL      Platelets 265 10*3/mm3      Neutrophil % 57.9 %      Lymphocyte % 27.0 %      Monocyte % 10.3 %      Eosinophil % 3.6 %      Basophil % 1.0 %      Immature Grans % 0.2 %      Neutrophils, Absolute 3.59 10*3/mm3      Lymphocytes, Absolute 1.67 10*3/mm3      Monocytes, Absolute 0.64 10*3/mm3      Eosinophils, Absolute 0.22 10*3/mm3      Basophils, Absolute 0.06 10*3/mm3      Immature Grans, Absolute 0.01 10*3/mm3      nRBC 0.0 /100 WBC     POC Glucose Once [[id number]]  (Abnormal) Collected:  03/18/19 0316    Specimen:  Blood Updated:  03/18/19 0337     Glucose 139 mg/dL      Comment: : [id number] [employer] ID: [id number]       POC Glucose Once [[id number]]  (Abnormal) Collected:  03/17/19 2020    Specimen:  Blood Updated:  03/17/19 2055     Glucose 164 mg/dL      Comment: : [id number] [employer] SystemsMeter ID: [id number]             Imaging Results (last 24 hours)     ** No  results found for the last 24 hours. **             Intake/Output Summary (Last 24 hours) at 3/18/2019 1835  Last data filed at 3/18/2019 1308  Gross per 24 hour   Intake 480 ml   Output 350 ml   Net 130 ml       Physical Exam    Constitutional: He appears well-developed and well-nourished.    Patient is fully dressed and walking in the halls and in his room.  He looks quite good today.  He is currently on room air with good oxygen saturations.  Head: Normocephalic and atraumatic.   Eyes: Conjunctivae are normal. Pupils are equal, round, and reactive to light.   Neck: Neck supple. No JVD present.   Cardiovascular: Normal rate, regular rhythm, normal heart sounds and intact distal pulses. Exam reveals no gallop and no friction rub. No murmur heard.  Pulmonary/Chest: He has no rales. Off oxygen.    Abdominal: Soft. Bowel sounds are normal. He exhibits no distension. There is no tenderness. There is no rebound and no guarding.   Musculoskeletal: Normal range of motion. He exhibits edema (improving). He exhibits no tenderness or deformity.  Right IJ permacath.  Neurological: Awake and alert. He displays normal reflexes. He exhibits normal muscle tone.    Skin: Skin is warm and dry. No rash noted. He has an excoriated ulcer on the left foot on the dorsum involving the fourth toe predominantly.          Results Review:  I have reviewed the labs, radiology results, and diagnostic studies since my last progress note and made treatment changes reflective of the results.   I have reviewed the current medications.    Assessment/Plan     Active Hospital Problems    Diagnosis   • **Acute respiratory failure with hypoxia and hypercapnia (CMS/HCC)   • Diabetic ulcer of toe of left foot associated with type 1 diabetes mellitus, limited to breakdown of skin (CMS/HCC)   • Acute systolic congestive heart failure (CMS/HCC)   • Acute kidney injury (CMS/HCC)   • Hyperkalemia   • Anemia   • History of recent pneumonia   • Chronic kidney  disease   • Type 1 diabetes, uncontrolled, with gastroparesis (CMS/HCC)   • Diabetic polyneuropathy associated with type 1 diabetes mellitus (CMS/HCC)   • Tobacco abuse disorder       PLAN:  Continue to seek outpatient dialysis chair time  I have encouraged the patient to stay in the hospital until outpatient chair time has been confirmed.  He seems reluctant and has stated that he may sign out AMA.    Damon Hargrove DO   03/18/19   6:35 PM

## 2019-03-18 NOTE — PLAN OF CARE
Problem: Patient Care Overview  Goal: Plan of Care Review  Outcome: Ongoing (interventions implemented as appropriate)   03/18/19 6170   OTHER   Outcome Summary Pt reports appetite very good; was actually eating [employer] at time of RD visit. Reviewed DM and Renal/HD MNT diet recommendations and encouraged compliance and follow up with RD at HD clinic post d/c. Will continue to follow.   Plan of Care Review   Progress improving   Coping/Psychosocial   Plan of Care Reviewed With patient

## 2019-03-18 NOTE — PROGRESS NOTES
Nephrology ([facility]) Progress Note      Patient:  Jose Shah  YOB: 1985  Date of Service: 3/18/2019  MRN: [id number]   Acct: [id number]   Primary Care Physician: Dai Boston APRN  Advance Directive:   Code Status and Medical Interventions:   Ordered at: 03/10/19 0743     Code Status:    CPR     Medical Interventions (Level of Support Prior to Arrest):    Full     Admit Date: 3/10/2019       Hospital Day: 8  Referring Provider: No ref. provider found      Patient personally seen and examined.  Complete chart including Consults, Notes, Operative Reports, Labs, Cardiology, and Radiology studies reviewed as able.        Subjective:  Jose Shah is a 33 y.o. male  whom we were consulted for acute kidney injury and hyperkalemia.  Baseline chronic kidney disease stage 4; follows with Dr Yan in [place], [place].  History of type I diabetes with noncompliance in the past, Lopez's esophagus, hypertension, COPD, tobacco abuse.  Recent piror admission at [facility] in [place] for pneumonia. Discharged from their facility on 3/09; and presented to [facility] emergency department on 3/10.  Presented with increased dyspnea, altered mental status. Required intubation and mechanical ventilation shortly after arrival.  Hospital course remarkable for good response to IV diuretics but has persistently elevated potassium level. Renal function without any improvement. On 3/13 had permcath placed and first dialysis treatment.  Extubated on 3/14. Has been tolerating dialysis treatments well.    Today awake/alert. No new overnight issues.  Urine output nonoliguric.  Seen during HD.  Dialysis   Pt was seen on RRT; tolerating well  Modality: Hemodialysis  Access: Catheter  Location: right upper  QB: 350  QD: 500  UF: 3000    Allergies:  Penicillins    Home Meds:  Medications Prior to Admission   Medication Sig Dispense Refill Last Dose   • CloNIDine (CATAPRES) 0.1  MG tablet Take 0.1 mg by mouth Every 8 (Eight) Hours.      • famotidine (PEPCID) 20 MG tablet Take 20 mg by mouth 2 (Two) Times a Day.      • hydrALAZINE (APRESOLINE) 25 MG tablet Take 25 mg by mouth Every 6 (Six) Hours As Needed.      • metoprolol tartrate (LOPRESSOR) 50 MG tablet Take 50 mg by mouth 2 (Two) Times a Day.      • Alcohol Swabs (ALCOHOL WIPES) 70 % pads Use 4 x daily 120 each 11 Taking   • amLODIPine (NORVASC) 5 MG tablet Take 5 mg by mouth Daily.   2/28/2019 at Unknown time   • atorvastatin (LIPITOR) 80 MG tablet Take 80 mg by mouth Daily.   2/28/2019 at Unknown time   • Blood Glucose Monitoring Suppl w/Device kit USE AS INDICATED, ANY MONITOR 1 each 1 Taking   • Glucose Blood (BLOOD GLUCOSE TEST) strip Use 4 x daily, use any brand covered by insurance or same brand as before 120 each 11 Taking   • insulin aspart (NOVOLOG) 100 UNIT/ML injection USE AS DIRECTED UP  UNITS DAILY 5 each 5 3/1/2019 at Unknown time   • Lancet Devices (LANCING DEVICE) misc USE AS INDICATED TO CORRELATE WITH STRIPS AND METER 1 each 1 Taking   • Lancets 30G misc USE 4 X DAILY 120 each 11 Taking   • metoclopramide (REGLAN) 10 MG tablet Take 10 mg by mouth 3 (Three) Times a Day Before Meals.   2/28/2019 at Unknown time   • pantoprazole (PROTONIX) 40 MG EC tablet Take 40 mg by mouth 2 (Two) Times a Day.   2/28/2019 at Unknown time   • Urine Glucose-Ketones Test strip 1 each by In Vitro route As Needed (elevated glucose). 100 each 11 Taking       Medicines:  Current Facility-Administered Medications   Medication Dose Route Frequency Provider Last Rate Last Dose   • amLODIPine (NORVASC) tablet 5 mg  5 mg Oral Daily Darius Sharma DO   5 mg at 03/17/19 0907   • atorvastatin (LIPITOR) tablet 80 mg  80 mg Oral Nightly Darius Sharma DO   80 mg at 03/17/19 2017   • dextrose (D50W) 25 g/ 50mL Intravenous Solution 25 g  25 g Intravenous Q15 Min PRN Cisco Castillo MD       • dextrose (GLUTOSE) oral gel 15 g  15 g Oral  Q15 Min PRN Cisco Castillo MD       • enoxaparin (LOVENOX) syringe 30 mg  30 mg Subcutaneous Q24H Cisco Castillo MD   30 mg at 03/17/19 0907   • famotidine (PEPCID) tablet 20 mg  20 mg Oral Daily Darius Sharma, DO       • glucagon (human recombinant) (GLUCAGEN DIAGNOSTIC) injection 1 mg  1 mg Subcutaneous PRN Cisco Castillo MD       • heparin (porcine) injection 3,600 Units  3,600 Units Intracatheter Once Ali, MD Marcelo       • heparin (porcine) injection 3,600 Units  3,600 Units Intracatheter Once Ali, MD Marcelo       • heparin (porcine) injection 4,000 Units  4,000 Units Intracatheter Once Claudio, MD Marcelo       • hydrALAZINE (APRESOLINE) injection 10 mg  10 mg Intravenous Q6H PRN Darius Sharma, DO   10 mg at 03/17/19 0655   • hydrALAZINE (APRESOLINE) tablet 25 mg  25 mg Oral Q8H Darius Sharma, DO   25 mg at 03/18/19 0415   • ipratropium-albuterol (DUO-NEB) nebulizer solution 3 mL  3 mL Nebulization Q4H PRN Mariia Santiago APRN       • labetalol (NORMODYNE,TRANDATE) injection 10 mg  10 mg Intravenous Q4H PRN Cisco Castillo MD   10 mg at 03/14/19 1222   • metoprolol tartrate (LOPRESSOR) tablet 50 mg  50 mg Oral Q12H Darius Sharma DO   50 mg at 03/17/19 2018   • Morphine sulfate (PF) injection 2 mg  2 mg Intravenous Q4H PRN Manjit Avila DO   2 mg at 03/16/19 2023    And   • naloxone (NARCAN) injection 0.4 mg  0.4 mg Intravenous Q5 Min PRN Manjit Avila DO       • ondansetron (ZOFRAN) injection 4 mg  4 mg Intravenous Q6H PRN Cisco Castillo MD   4 mg at 03/14/19 1615   • sodium chloride 0.9 % bolus 100 mL  100 mL Intravenous PRN Marcelo Snyder MD       • sodium chloride 0.9 % flush 10 mL  10 mL Intravenous PRN Ger Chino MD       • sodium chloride 0.9 % flush 3 mL  3 mL Intravenous Q12H Cisco Castillo MD   3 mL at 03/17/19 0907   • sodium chloride 0.9 % flush 3-10 mL  3-10 mL Intravenous PRN Cisco Castillo MD           Past  Medical History:  Past Medical History:   Diagnosis Date   • Bleeding from the nose    • Chronic kidney disease    • Diabetic polyneuropathy associated with type 1 diabetes mellitus (CMS/[facility]) 12/18/2017   • GERD (gastroesophageal reflux disease)    • Hypertension    • Tobacco abuse disorder 12/18/2017   • Type 1 diabetes mellitus with severe nonproliferative retinopathy of both eyes (CMS/[facility]) 12/18/2017       Past Surgical History:  Past Surgical History:   Procedure Laterality Date   • EYE SURGERY Right    • HERNIA REPAIR     • INSERTION HEMODIALYSIS CATHETER Right 3/13/2019    Procedure: HEMODIALYSIS CATHETER INSERTION;  Surgeon: Manjit Avila DO;  Location: [place] [address];  Service: Vascular   • OTHER SURGICAL HISTORY      insulin pump insertion   • SKIN GRAFT         Family History  Family History   Problem Relation Age of Onset   • Hypertension Mother        Social History  Social History     Socioeconomic History   • Marital status: Single     Spouse name: Not on file   • Number of children: Not on file   • Years of education: Not on file   • Highest education level: Not on file   Social Needs   • Financial resource strain: Not on file   • Food insecurity - worry: Not on file   • Food insecurity - inability: Not on file   • Transportation needs - medical: Not on file   • Transportation needs - non-medical: Not on file   Occupational History   • Not on file   Tobacco Use   • Smoking status: Current Every Day Smoker   • Smokeless tobacco: Never Used   Substance and Sexual Activity   • Alcohol use: Not on file   • Drug use: Defer   • Sexual activity: Defer   Other Topics Concern   • Not on file   Social History Narrative   • Not on file         Review of Systems:  History obtained from chart review and the patient  General ROS: positive for  - fatigue  Respiratory ROS: no cough, shortness of breath, or wheezing  Cardiovascular ROS: no chest pain or dyspnea on exertion  Gastrointestinal ROS: no  abdominal pain, change in bowel habits, or black or bloody stools  Genito-Urinary ROS: no dysuria, trouble voiding, or hematuria  Musculoskeletal ROS: negative  Neurological ROS: no TIA or stroke symptoms    Objective:  Patient Vitals for the past 24 hrs:   BP Temp Temp src Pulse Resp SpO2 Weight   03/18/19 0402 (!) 163/103 -- -- -- -- -- --   03/18/19 0337 -- 98.4 °F (36.9 °C) Oral 85 18 97 % --   03/18/19 0002 149/90 98.7 °F (37.1 °C) Oral 90 18 97 % --   03/17/19 2015 142/88 98.7 °F (37.1 °C) Oral 85 16 98 % 85.3 kg (188 lb)   03/17/19 1954 -- -- -- 80 -- 96 % --   03/17/19 1610 154/96 97.7 °F (36.5 °C) Oral 79 16 97 % --   03/17/19 1134 150/85 98 °F (36.7 °C) Oral 87 18 97 % --     No intake or output data in the 24 hours ending 03/18/19 1018  General: awake/alert    Neck: supple, no JVD  Chest:  clear to auscultation bilaterally without respiratory distress  CVS: regular rate and rhythm  Abdominal: soft, nontender, positive bowel sounds  Extremities: lower extremity 1+ edema  Skin: warm and dry without rash  Neuro: no focal motor deficits    Labs:  Results from last 7 days   Lab Units 03/18/19 0432 03/17/19  0504 03/16/19  0514   WBC 10*3/mm3 6.19 7.33 8.17   HEMOGLOBIN g/dL 11.5* 10.1* 10.8*   HEMATOCRIT % 34.6* 30.4* 33.0*   PLATELETS 10*3/mm3 265 241 267         Results from last 7 days   Lab Units 03/18/19  0432 03/17/19  0504 03/16/19  1507 03/16/19  0514   SODIUM mmol/L 139 139 136 136   POTASSIUM mmol/L 4.6 3.8 4.5 4.9   CHLORIDE mmol/L 100 102 96* 98   CO2 mmol/L 28.0 29.0 23.0* 13.0*   BUN mg/dL 59* 53* 47* 38*   CREATININE mg/dL 3.95* 4.07* 3.74* 3.18*   CALCIUM mg/dL 8.4 8.4 8.3* 8.5   BILIRUBIN mg/dL 0.5 0.4  --  0.5   ALK PHOS U/L 93 82  --  93   ALT (SGPT) U/L 18 20  --  21   AST (SGOT) U/L 25 20  --  26   GLUCOSE mg/dL 170* 100 349* 261*       Radiology:   Imaging Results (last 72 hours)     Procedure Component Value Units Date/Time    XR Chest 1 View [[id number]] Collected:  03/12/19 0731      Updated:  03/12/19 0736    Narrative:       EXAMINATION: XR CHEST 1 VW-     3/12/2019 3:40 AM CDT     HISTORY: Intubated Patient; J96.01-Acute respiratory failure with  hypoxia; J96.02-Acute respiratory failure with hypercapnia;  N18.9-Chronic kidney disease, unspecified; E87.5-Hyperkalemia;  E87.70-Fluid overload, unspecified; D64.9-Anemia, unspecified.     One view chest x-ray compared with yesterday.     Endotracheal tube remains in good position. The tip is approximately 5  cm above the gabrielle.  A nasogastric tube has been placed and is in good position.     Heart size is unchanged.     Bibasilar consolidation is the same or slightly worse. No improvement.     No pneumothorax.     Summary:  1. No significant change.  This report was finalized on 03/12/2019 07:33 by Dr. Papito Oglesby MD.    US Renal Bilateral [[id number]] Collected:  03/11/19 1515     Updated:  03/11/19 1519    Narrative:       EXAMINATION: US RENAL BILATERAL-     3/11/2019 1:40 PM CDT     HISTORY: CAMILO; J96.01-Acute respiratory failure with hypoxia;  J96.02-Acute respiratory failure with hypercapnia; N18.9-Chronic kidney  disease, unspecified; E87.5-Hyperkalemia; E87.70-Fluid overload,  unspecified; D64.9-Anemia, unspecified.     Detail is limited. There is no hydronephrosis.  Cortical thickness and cortical echogenicity is appropriate.     Right kidney = 1 39 x 51 x 68 mm.  Left kidney = 1 38 x 62 x 63 mm.     Pleural fluid is incidentally noted.     Summary:  1. Symmetric kidneys with no obstruction.  This report was finalized on 03/11/2019 15:16 by Dr. Papito Oglesby MD.    XR Abdomen KUB [[id number]] Collected:  03/11/19 1101     Updated:  03/11/19 1105    Narrative:       EXAMINATION: XR ABDOMEN KUB-     3/11/2019 10:17 AM CDT     HISTORY: ngt placement; J96.01-Acute respiratory failure with hypoxia;  J96.02-Acute respiratory failure with hypercapnia; N18.9-Chronic kidney  disease, unspecified; E87.5-Hyperkalemia; E87.70-Fluid  overload,  unspecified; D64.9-Anemia, unspecified.     Portable radiograph of the upper abdomen and lower chest shows a well  positioned nasogastric tube extending to or beyond the mid stomach.     Bibasilar infiltrate noted.  Normal bones and normal visualized bowel gas pattern.     Summary:  1. Well-positioned nasogastric tube.  This report was finalized on 03/11/2019 11:02 by Dr. Papito Oglesby MD.    XR Chest 1 View [[id number]] Collected:  03/11/19 0657     Updated:  03/11/19 0701    Narrative:       EXAMINATION:   XR CHEST 1 VW-  3/11/2019 6:57 AM CDT     HISTORY: Patient intubated     Frontal upright radiograph of the chest 3/11/2019 3:19 AM CDT     COMPARISON: March 10, 2019.     FINDINGS:   Again bilateral interstitial air space filling infiltrates are present.  Says appearance of pulmonary edema. This is not significantly changed  compared to prior study March 10. Increased density in the right lung  base is noted in the left lung base. This may be posterior pleural  effusions. The cardiac silhouette is normal. Infiltrate tube is  unchanged in position..      The osseous structures and surrounding soft tissues demonstrate no acute  abnormality.       Impression:       1. Persistent bilateral interstitial air space filling infiltrates. Says  appearance pulmonary edema. Bilateral pleural effusions are noted. There  is no significant improvement from March 10.        This report was finalized on 03/11/2019 06:58 by Dr. Natalio Uriostegui MD.    XR Chest 1 View [[id number]] Collected:  03/10/19 0952     Updated:  03/10/19 0956    Narrative:       HISTORY: Dyspnea     CXR: A frontal view the chest is obtained.     COMPARISON: 12/2/2012     FINDINGS: Endotracheal tube is appropriately positioned with the tip at  the T4 level. There are diffuse bilateral pulmonary opacities. Mixed  interstitial alveolar. There are small pleural effusions. Cardiac  silhouette is obscured by the pulmonary opacities. There is  no  pneumothorax. There is no acute bony pathology.       Impression:       1. Diffuse mixed interstitial alveolar opacities with small pleural  effusions. Findings may be due to edema and/or pneumonia. Appropriate  positioning of the endotracheal tube.  This report was finalized on 03/10/2019 09:53 by Dr. Teri Schwartz MD.    CT Chest Without Contrast [[id number]] Collected:  03/10/19 0758     Updated:  03/10/19 0805    Narrative:       CT CHEST WO CONTRAST- 3/10/2019 7:35 AM CDT     HISTORY: respiratory distress, eval for pna vs edema      COMPARISON: None     DOSE LENGTH PRODUCT: 477 mGy cm. Automated exposure control was also  utilized to decrease patient radiation dose.     TECHNIQUE: Axial images the chest are obtained without IV contrast     FINDINGS:  The endotracheal tube is appropriate in position with the tip  above the gabrielle. Reactive mediastinal lymph nodes measure up to 10 mm.  The thoracic aorta appears normal in caliber. The heart is borderline  prominent in size. There are small pericardial effusion is identified.  There is a small to moderate right and a small left pleural effusion.     Limited images the upper abdomen demonstrate no adrenal nodules.     There is smooth interlobular septal thickening within the aerated upper  lobes. There are scattered patchy groundglass opacities seen throughout  the lung parenchyma with dense consolidation of the lower lobes. No  discrete endobronchial lesions are identified. There is no pneumothorax.     No focal destructive bony lesions are visualized.       Impression:       1. Bilateral diffuse patchy opacities are suggestive for multifocal  pneumonia. There is also interlobular septal thickening suggesting  underlying edema. There is a small to moderate right and small left  pleural effusion with a very small pericardial effusion. Heart is  borderline enlarged. Thoracic aorta normal in caliber.  2. Appropriate positioning of the endotracheal tube.  This  report was finalized on 03/10/2019 08:02 by Dr. Teri Schwartz MD.          Culture:  Blood Culture   Date Value Ref Range Status   03/10/2019 No growth at 2 days  Preliminary   03/10/2019 No growth at 2 days  Preliminary     Respiratory Culture   Date Value Ref Range Status   03/10/2019 Light growth (2+) Normal Respiratory [place]  Preliminary         Assessment   1.  Acute kidney injury; due to ATN--now on dialysis  2.  Baseline chronic kidney disease stage 4  3.  Type 1 diabetes with nephropathy  4.  Acute hypoxic respiratory failure--resolved  5.  Metabolic acidosis--resolved  6.  Anemia of chronic kidney disease  7.  Clinically volume overloaded--improving  8.  Essential hypertension    Plan:  1.  Dialysis today  2.  Working on outpatient dialysis placement  3.  Monitor labs    Aleksandar Conteh, MASOUD  3/18/2019  10:18 AM

## 2019-03-19 VITALS
OXYGEN SATURATION: 97 % | SYSTOLIC BLOOD PRESSURE: 160 MMHG | HEART RATE: 87 BPM | BODY MASS INDEX: 24.14 KG/M2 | RESPIRATION RATE: 20 BRPM | DIASTOLIC BLOOD PRESSURE: 97 MMHG | HEIGHT: 74 IN | TEMPERATURE: 98.7 F | WEIGHT: 188.13 LBS

## 2019-03-19 PROBLEM — I50.21 ACUTE SYSTOLIC CONGESTIVE HEART FAILURE (HCC): Status: RESOLVED | Noted: 2019-03-10 | Resolved: 2019-03-19

## 2019-03-19 PROBLEM — J81.0 ACUTE PULMONARY EDEMA (HCC): Status: ACTIVE | Noted: 2019-03-19

## 2019-03-19 LAB
ANION GAP SERPL CALCULATED.3IONS-SCNC: 10 MMOL/L (ref 4–13)
BUN BLD-MCNC: 45 MG/DL (ref 5–21)
BUN/CREAT SERPL: 13.9 (ref 7–25)
CALCIUM SPEC-SCNC: 7.9 MG/DL (ref 8.4–10.4)
CHLORIDE SERPL-SCNC: 100 MMOL/L (ref 98–110)
CO2 SERPL-SCNC: 26 MMOL/L (ref 24–31)
CREAT BLD-MCNC: 3.23 MG/DL (ref 0.5–1.4)
GFR SERPL CREATININE-BSD FRML MDRD: 22 ML/MIN/1.73
GLUCOSE BLD-MCNC: 282 MG/DL (ref 70–100)
GLUCOSE BLDC GLUCOMTR-MCNC: 207 MG/DL (ref 70–130)
POTASSIUM BLD-SCNC: 4.6 MMOL/L (ref 3.5–5.3)
SODIUM BLD-SCNC: 136 MMOL/L (ref 135–145)

## 2019-03-19 PROCEDURE — 82962 GLUCOSE BLOOD TEST: CPT

## 2019-03-19 PROCEDURE — 94760 N-INVAS EAR/PLS OXIMETRY 1: CPT

## 2019-03-19 PROCEDURE — 80048 BASIC METABOLIC PNL TOTAL CA: CPT | Performed by: INTERNAL MEDICINE

## 2019-03-19 PROCEDURE — 94799 UNLISTED PULMONARY SVC/PX: CPT

## 2019-03-19 RX ORDER — SEVELAMER CARBONATE 800 MG/1
800 TABLET, FILM COATED ORAL
Qty: 90 TABLET | Refills: 2 | Status: SHIPPED | OUTPATIENT
Start: 2019-03-19

## 2019-03-19 RX ADMIN — SEVELAMER CARBONATE 800 MG: 800 TABLET, FILM COATED ORAL at 09:13

## 2019-03-19 RX ADMIN — FAMOTIDINE 20 MG: 20 TABLET, FILM COATED ORAL at 09:14

## 2019-03-19 RX ADMIN — HYDRALAZINE HYDROCHLORIDE 25 MG: 25 TABLET, FILM COATED ORAL at 06:03

## 2019-03-19 RX ADMIN — AMLODIPINE BESYLATE 5 MG: 5 TABLET ORAL at 09:15

## 2019-03-19 RX ADMIN — METOPROLOL TARTRATE 50 MG: 50 TABLET ORAL at 09:14

## 2019-03-19 NOTE — PROGRESS NOTES
cContinued Stay Note  [facility]     Patient Name: Jose Shah  MRN: [id number]  Today's Date: 3/19/2019    Admit Date: 3/10/2019    Discharge Plan     Row Name 03/19/19 1046       Plan    Plan  Home    Patient/Family in Agreement with Plan  yes    Final Discharge Disposition Code  01 - home or self-care    Final Note  Patient will be discharged home. Pt now has chair time scheduled for [facility] in [place] at 2 30pm, first tx at 2 00pm. Pt aware of schedule.      Row Name 03/19/19 0841       Plan    Plan  Home    Patient/Family in Agreement with Plan  yes    Plan Comments  Spoke with patient to assess for home needs. Pt lives at home with his mother and plans same.  Left message for Jennifer Rose [phone] from [employer] to find out about dialysis chair time.  Patient says his mother will transport him back and forth for treatments. Will follow.         Discharge Codes    No documentation.             CYNTHIA Culver

## 2019-03-19 NOTE — PROGRESS NOTES
Nephrology ([facility]) Progress Note      Patient:  Jose Shah  YOB: 1985  Date of Service: 3/19/2019  MRN: [id number]   Acct: [id number]   Primary Care Physician: Dai Boston APRN  Advance Directive:   Code Status and Medical Interventions:   Ordered at: 03/10/19 0743     Code Status:    CPR     Medical Interventions (Level of Support Prior to Arrest):    Full     Admit Date: 3/10/2019       Hospital Day: 9  Referring Provider: No ref. provider found      Patient personally seen and examined.  Complete chart including Consults, Notes, Operative Reports, Labs, Cardiology, and Radiology studies reviewed as able.        Subjective:  Jose Shah is a 33 y.o. male  whom we were consulted for acute kidney injury and hyperkalemia.  Baseline chronic kidney disease stage 4; follows with Dr Yan in [place], [place].  History of type I diabetes with noncompliance in the past, Lopez's esophagus, hypertension, COPD, tobacco abuse.  Recent piror admission at [facility] in [place] for pneumonia. Discharged from their facility on 3/09; and presented to [facility] emergency department on 3/10.  Presented with increased dyspnea, altered mental status. Required intubation and mechanical ventilation shortly after arrival.  Hospital course remarkable for good response to IV diuretics but has persistently elevated potassium level. Renal function without any improvement. On 3/13 had permcath placed and first dialysis treatment.  Extubated on 3/14. Has been tolerating dialysis treatments well.    Today awake/alert. No new overnight issues.  Urine output nonoliguric.  Patient is quite anxious for discharge.    Allergies:  Penicillins    Home Meds:  Medications Prior to Admission   Medication Sig Dispense Refill Last Dose   • CloNIDine (CATAPRES) 0.1 MG tablet Take 0.1 mg by mouth Every 8 (Eight) Hours.      • famotidine (PEPCID) 20 MG tablet Take 20 mg by mouth 2  (Two) Times a Day.      • hydrALAZINE (APRESOLINE) 25 MG tablet Take 25 mg by mouth Every 6 (Six) Hours As Needed.      • metoprolol tartrate (LOPRESSOR) 50 MG tablet Take 50 mg by mouth 2 (Two) Times a Day.      • Alcohol Swabs (ALCOHOL WIPES) 70 % pads Use 4 x daily 120 each 11 Taking   • amLODIPine (NORVASC) 5 MG tablet Take 5 mg by mouth Daily.   2/28/2019 at Unknown time   • atorvastatin (LIPITOR) 80 MG tablet Take 80 mg by mouth Daily.   2/28/2019 at Unknown time   • Blood Glucose Monitoring Suppl w/Device kit USE AS INDICATED, ANY MONITOR 1 each 1 Taking   • Glucose Blood (BLOOD GLUCOSE TEST) strip Use 4 x daily, use any brand covered by insurance or same brand as before 120 each 11 Taking   • insulin aspart (NOVOLOG) 100 UNIT/ML injection USE AS DIRECTED UP  UNITS DAILY 5 each 5 3/1/2019 at Unknown time   • Lancet Devices (LANCING DEVICE) misc USE AS INDICATED TO CORRELATE WITH STRIPS AND METER 1 each 1 Taking   • Lancets 30G misc USE 4 X DAILY 120 each 11 Taking   • metoclopramide (REGLAN) 10 MG tablet Take 10 mg by mouth 3 (Three) Times a Day Before Meals.   2/28/2019 at Unknown time   • pantoprazole (PROTONIX) 40 MG EC tablet Take 40 mg by mouth 2 (Two) Times a Day.   2/28/2019 at Unknown time   • Urine Glucose-Ketones Test strip 1 each by In Vitro route As Needed (elevated glucose). 100 each 11 Taking       Medicines:  Current Facility-Administered Medications   Medication Dose Route Frequency Provider Last Rate Last Dose   • amLODIPine (NORVASC) tablet 5 mg  5 mg Oral Daily Darius Sharma DO   5 mg at 03/19/19 0915   • atorvastatin (LIPITOR) tablet 80 mg  80 mg Oral Nightly Darius Sharma DO   80 mg at 03/18/19 2215   • dextrose (D50W) 25 g/ 50mL Intravenous Solution 25 g  25 g Intravenous Q15 Min PRN Cisco Castillo MD       • dextrose (GLUTOSE) oral gel 15 g  15 g Oral Q15 Min PRN Cisco Castillo MD       • enoxaparin (LOVENOX) syringe 30 mg  30 mg Subcutaneous Q24H Jonathan  Cisco ROCHA MD   30 mg at 03/18/19 1334   • famotidine (PEPCID) tablet 20 mg  20 mg Oral Daily Darius Sharma DO   20 mg at 03/19/19 0914   • glucagon (human recombinant) (GLUCAGEN DIAGNOSTIC) injection 1 mg  1 mg Subcutaneous PRN Cisco Castillo MD       • heparin (porcine) injection 3,600 Units  3,600 Units Intracatheter Once Ali, MD Marcelo       • heparin (porcine) injection 3,600 Units  3,600 Units Intracatheter Once Ali, MD Marcelo       • heparin (porcine) injection 4,000 Units  4,000 Units Intracatheter Once Ali, MD Marcelo       • hydrALAZINE (APRESOLINE) injection 10 mg  10 mg Intravenous Q6H PRN Darius Sharma DO   10 mg at 03/17/19 0655   • hydrALAZINE (APRESOLINE) tablet 25 mg  25 mg Oral Q8H Darius Sharma, DO   25 mg at 03/19/19 0603   • ipratropium-albuterol (DUO-NEB) nebulizer solution 3 mL  3 mL Nebulization Q4H PRN Mariia Santiago APRN       • labetalol (NORMODYNE,TRANDATE) injection 10 mg  10 mg Intravenous Q4H PRN Cisco Castillo MD   10 mg at 03/14/19 1222   • metoprolol tartrate (LOPRESSOR) tablet 50 mg  50 mg Oral Q12H Darius Sharma DO   50 mg at 03/19/19 0914   • naloxone (NARCAN) injection 0.4 mg  0.4 mg Intravenous Q5 Min PRN Manjit Avila DO       • ondansetron (ZOFRAN) injection 4 mg  4 mg Intravenous Q6H PRN Cisco Castillo MD   4 mg at 03/14/19 1615   • sevelamer (RENVELA) tablet 800 mg  800 mg Oral TID With Meals Ruben Guzman MD   800 mg at 03/19/19 0913   • sodium chloride 0.9 % bolus 100 mL  100 mL Intravenous PRN Marcelo Snyder MD       • sodium chloride 0.9 % flush 10 mL  10 mL Intravenous PRN Ger Chino MD       • sodium chloride 0.9 % flush 3 mL  3 mL Intravenous Q12H Cisco Castillo MD   3 mL at 03/17/19 0907   • sodium chloride 0.9 % flush 3-10 mL  3-10 mL Intravenous PRN Cisco Castillo MD           Past Medical History:  Past Medical History:   Diagnosis Date   • Bleeding from the nose    • Chronic  kidney disease    • Diabetic polyneuropathy associated with type 1 diabetes mellitus (CMS/[facility]) 12/18/2017   • GERD (gastroesophageal reflux disease)    • Hypertension    • Tobacco abuse disorder 12/18/2017   • Type 1 diabetes mellitus with severe nonproliferative retinopathy of both eyes (CMS/[facility]) 12/18/2017       Past Surgical History:  Past Surgical History:   Procedure Laterality Date   • EYE SURGERY Right    • HERNIA REPAIR     • INSERTION HEMODIALYSIS CATHETER Right 3/13/2019    Procedure: HEMODIALYSIS CATHETER INSERTION;  Surgeon: Manjit Avila DO;  Location: [place] [address];  Service: Vascular   • OTHER SURGICAL HISTORY      insulin pump insertion   • SKIN GRAFT         Family History  Family History   Problem Relation Age of Onset   • Hypertension Mother        Social History  Social History     Socioeconomic History   • Marital status: Single     Spouse name: Not on file   • Number of children: Not on file   • Years of education: Not on file   • Highest education level: Not on file   Social Needs   • Financial resource strain: Not on file   • Food insecurity - worry: Not on file   • Food insecurity - inability: Not on file   • Transportation needs - medical: Not on file   • Transportation needs - non-medical: Not on file   Occupational History   • Not on file   Tobacco Use   • Smoking status: Current Every Day Smoker   • Smokeless tobacco: Never Used   Substance and Sexual Activity   • Alcohol use: Not on file   • Drug use: Defer   • Sexual activity: Defer   Other Topics Concern   • Not on file   Social History Narrative   • Not on file         Review of Systems:  History obtained from chart review and the patient  General ROS: positive for  - fatigue  Respiratory ROS: no cough, shortness of breath, or wheezing  Cardiovascular ROS: no chest pain or dyspnea on exertion  Gastrointestinal ROS: no abdominal pain, change in bowel habits, or black or bloody stools  Genito-Urinary ROS: no dysuria,  trouble voiding, or hematuria  Musculoskeletal ROS: negative  Neurological ROS: no TIA or stroke symptoms    Objective:  Patient Vitals for the past 24 hrs:   BP Temp Temp src Pulse Resp SpO2 Weight   03/19/19 0800 160/97 98.7 °F (37.1 °C) Oral 87 20 97 % --   03/19/19 0657 -- -- -- 90 16 97 % --   03/19/19 0447 159/94 98.1 °F (36.7 °C) Oral 83 18 94 % --   03/18/19 2300 -- -- -- -- -- 96 % --   03/18/19 2114 -- -- -- -- -- 97 % --   03/18/19 2040 139/85 98.2 °F (36.8 °C) Oral 85 18 98 % 85.3 kg (188 lb 2 oz)   03/18/19 1623 137/76 98 °F (36.7 °C) Oral 83 18 98 % --   03/18/19 1121 153/98 98.4 °F (36.9 °C) Oral 93 18 93 % --   03/18/19 1106 -- -- -- 87 -- 96 % --       Intake/Output Summary (Last 24 hours) at 3/19/2019 1059  Last data filed at 3/19/2019 1033  Gross per 24 hour   Intake 720 ml   Output 650 ml   Net 70 ml     General: awake/alert    Neck: supple, no JVD  Chest:  clear to auscultation bilaterally without respiratory distress  CVS: regular rate and rhythm  Abdominal: soft, nontender, positive bowel sounds  Extremities: lower extremity 1+ edema  Skin: warm and dry without rash  Neuro: no focal motor deficits    Labs:  Results from last 7 days   Lab Units 03/18/19  0432 03/17/19  0504 03/16/19  0514   WBC 10*3/mm3 6.19 7.33 8.17   HEMOGLOBIN g/dL 11.5* 10.1* 10.8*   HEMATOCRIT % 34.6* 30.4* 33.0*   PLATELETS 10*3/mm3 265 241 267         Results from last 7 days   Lab Units 03/19/19  0437 03/18/19  0432 03/17/19  0504  03/16/19  0514   SODIUM mmol/L 136 139 139   < > 136   POTASSIUM mmol/L 4.6 4.6 3.8   < > 4.9   CHLORIDE mmol/L 100 100 102   < > 98   CO2 mmol/L 26.0 28.0 29.0   < > 13.0*   BUN mg/dL 45* 59* 53*   < > 38*   CREATININE mg/dL 3.23* 3.95* 4.07*   < > 3.18*   CALCIUM mg/dL 7.9* 8.4 8.4   < > 8.5   BILIRUBIN mg/dL  --  0.5 0.4  --  0.5   ALK PHOS U/L  --  93 82  --  93   ALT (SGPT) U/L  --  18 20  --  21   AST (SGOT) U/L  --  25 20  --  26   GLUCOSE mg/dL 282* 170* 100   < > 261*    < > =  values in this interval not displayed.       Radiology:   Imaging Results (last 72 hours)     Procedure Component Value Units Date/Time    XR Chest 1 View [[id number]] Collected:  03/12/19 0731     Updated:  03/12/19 0736    Narrative:       EXAMINATION: XR CHEST 1 VW-     3/12/2019 3:40 AM CDT     HISTORY: Intubated Patient; J96.01-Acute respiratory failure with  hypoxia; J96.02-Acute respiratory failure with hypercapnia;  N18.9-Chronic kidney disease, unspecified; E87.5-Hyperkalemia;  E87.70-Fluid overload, unspecified; D64.9-Anemia, unspecified.     One view chest x-ray compared with yesterday.     Endotracheal tube remains in good position. The tip is approximately 5  cm above the gabrielle.  A nasogastric tube has been placed and is in good position.     Heart size is unchanged.     Bibasilar consolidation is the same or slightly worse. No improvement.     No pneumothorax.     Summary:  1. No significant change.  This report was finalized on 03/12/2019 07:33 by Dr. Papito Oglesby MD.    US Renal Bilateral [[id number]] Collected:  03/11/19 1515     Updated:  03/11/19 1519    Narrative:       EXAMINATION: US RENAL BILATERAL-     3/11/2019 1:40 PM CDT     HISTORY: CAMILO; J96.01-Acute respiratory failure with hypoxia;  J96.02-Acute respiratory failure with hypercapnia; N18.9-Chronic kidney  disease, unspecified; E87.5-Hyperkalemia; E87.70-Fluid overload,  unspecified; D64.9-Anemia, unspecified.     Detail is limited. There is no hydronephrosis.  Cortical thickness and cortical echogenicity is appropriate.     Right kidney = 1 39 x 51 x 68 mm.  Left kidney = 1 38 x 62 x 63 mm.     Pleural fluid is incidentally noted.     Summary:  1. Symmetric kidneys with no obstruction.  This report was finalized on 03/11/2019 15:16 by Dr. Papito Oglesby MD.    XR Abdomen KUB [[id number]] Collected:  03/11/19 1101     Updated:  03/11/19 1105    Narrative:       EXAMINATION: XR ABDOMEN KUB-     3/11/2019 10:17 AM CDT     HISTORY: ngt  placement; J96.01-Acute respiratory failure with hypoxia;  J96.02-Acute respiratory failure with hypercapnia; N18.9-Chronic kidney  disease, unspecified; E87.5-Hyperkalemia; E87.70-Fluid overload,  unspecified; D64.9-Anemia, unspecified.     Portable radiograph of the upper abdomen and lower chest shows a well  positioned nasogastric tube extending to or beyond the mid stomach.     Bibasilar infiltrate noted.  Normal bones and normal visualized bowel gas pattern.     Summary:  1. Well-positioned nasogastric tube.  This report was finalized on 03/11/2019 11:02 by Dr. Papito Oglesby MD.    XR Chest 1 View [[id number]] Collected:  03/11/19 0657     Updated:  03/11/19 0701    Narrative:       EXAMINATION:   XR CHEST 1 VW-  3/11/2019 6:57 AM CDT     HISTORY: Patient intubated     Frontal upright radiograph of the chest 3/11/2019 3:19 AM CDT     COMPARISON: March 10, 2019.     FINDINGS:   Again bilateral interstitial air space filling infiltrates are present.  Says appearance of pulmonary edema. This is not significantly changed  compared to prior study March 10. Increased density in the right lung  base is noted in the left lung base. This may be posterior pleural  effusions. The cardiac silhouette is normal. Infiltrate tube is  unchanged in position..      The osseous structures and surrounding soft tissues demonstrate no acute  abnormality.       Impression:       1. Persistent bilateral interstitial air space filling infiltrates. Says  appearance pulmonary edema. Bilateral pleural effusions are noted. There  is no significant improvement from March 10.        This report was finalized on 03/11/2019 06:58 by Dr. Natalio Uriostegui MD.    XR Chest 1 View [[id number]] Collected:  03/10/19 0952     Updated:  03/10/19 0956    Narrative:       HISTORY: Dyspnea     CXR: A frontal view the chest is obtained.     COMPARISON: 12/2/2012     FINDINGS: Endotracheal tube is appropriately positioned with the tip at  the T4 level. There are  diffuse bilateral pulmonary opacities. Mixed  interstitial alveolar. There are small pleural effusions. Cardiac  silhouette is obscured by the pulmonary opacities. There is no  pneumothorax. There is no acute bony pathology.       Impression:       1. Diffuse mixed interstitial alveolar opacities with small pleural  effusions. Findings may be due to edema and/or pneumonia. Appropriate  positioning of the endotracheal tube.  This report was finalized on 03/10/2019 09:53 by Dr. Teir Schwartz MD.    CT Chest Without Contrast [[id number]] Collected:  03/10/19 0758     Updated:  03/10/19 0805    Narrative:       CT CHEST WO CONTRAST- 3/10/2019 7:35 AM CDT     HISTORY: respiratory distress, eval for pna vs edema      COMPARISON: None     DOSE LENGTH PRODUCT: 477 mGy cm. Automated exposure control was also  utilized to decrease patient radiation dose.     TECHNIQUE: Axial images the chest are obtained without IV contrast     FINDINGS:  The endotracheal tube is appropriate in position with the tip  above the gabrielle. Reactive mediastinal lymph nodes measure up to 10 mm.  The thoracic aorta appears normal in caliber. The heart is borderline  prominent in size. There are small pericardial effusion is identified.  There is a small to moderate right and a small left pleural effusion.     Limited images the upper abdomen demonstrate no adrenal nodules.     There is smooth interlobular septal thickening within the aerated upper  lobes. There are scattered patchy groundglass opacities seen throughout  the lung parenchyma with dense consolidation of the lower lobes. No  discrete endobronchial lesions are identified. There is no pneumothorax.     No focal destructive bony lesions are visualized.       Impression:       1. Bilateral diffuse patchy opacities are suggestive for multifocal  pneumonia. There is also interlobular septal thickening suggesting  underlying edema. There is a small to moderate right and small left  pleural  effusion with a very small pericardial effusion. Heart is  borderline enlarged. Thoracic aorta normal in caliber.  2. Appropriate positioning of the endotracheal tube.  This report was finalized on 03/10/2019 08:02 by Dr. Teri Schwartz MD.          Culture:  Blood Culture   Date Value Ref Range Status   03/10/2019 No growth at 2 days  Preliminary   03/10/2019 No growth at 2 days  Preliminary     Respiratory Culture   Date Value Ref Range Status   03/10/2019 Light growth (2+) Normal Respiratory [place]  Preliminary         Assessment   1.  Acute kidney injury; due to ATN--now on dialysis  2.  Baseline chronic kidney disease stage 4  3.  Type 1 diabetes with nephropathy  4.  Acute hypoxic respiratory failure--resolved  5.  Metabolic acidosis--resolved  6.  Anemia of chronic kidney disease  7.  Clinically volume overloaded--improving  8.  Essential hypertension    Plan:  1.  Dialysis next due 3/20  2.  Has outpatient dialysis chair time at [facility].  OK for discharge today if it is confirmed he will be able to start outpatient HD tomorrow at [place]    Aleksandar Conteh, MASOUD  3/19/2019  10:59 AM

## 2019-03-19 NOTE — PLAN OF CARE
Problem: Patient Care Overview  Goal: Plan of Care Review  Outcome: Ongoing (interventions implemented as appropriate)   03/19/19 0644   OTHER   Outcome Summary Pt waiting for out patient dialysis chair. Hoping for d/c today. Pt up ad zee on floor. BG elevated, pt adjusted insulin pump accordingly. Continue to monitor   Plan of Care Review   Progress no change   Coping/Psychosocial   Plan of Care Reviewed With patient       Problem: Skin Injury Risk (Adult)  Goal: Identify Related Risk Factors and Signs and Symptoms  Outcome: Outcome(s) achieved Date Met: 03/19/19    Goal: Skin Health and Integrity  Outcome: Outcome(s) achieved Date Met: 03/19/19      Problem: Fall Risk (Adult)  Goal: Absence of Fall  Outcome: Outcome(s) achieved Date Met: 03/19/19      Problem: Pneumonia (Adult)  Goal: Signs and Symptoms of Listed Potential Problems Will be Absent, Minimized or Managed (Pneumonia)  Outcome: Ongoing (interventions implemented as appropriate)      Problem: Renal Failure/Kidney Injury, Acute (Adult)  Goal: Signs and Symptoms of Listed Potential Problems Will be Absent, Minimized or Managed (Renal Failure/Kidney Injury, Acute)  Outcome: Ongoing (interventions implemented as appropriate)      Problem: Diabetes, Type 1 (Adult)  Goal: Signs and Symptoms of Listed Potential Problems Will be Absent, Minimized or Managed (Diabetes, Type 1)  Outcome: Ongoing (interventions implemented as appropriate)

## 2019-03-19 NOTE — DISCHARGE SUMMARY
[facility] Medicine Services  DISCHARGE SUMMARY       Date of Admission: 3/10/2019  Date of Discharge:  3/19/2019  Primary Care Physician: Dai Boston APRN    Discharge Diagnoses:  Patient Active Problem List   Diagnosis   • Type 1 diabetes, uncontrolled, with gastroparesis (CMS/HCC)   • Diabetic polyneuropathy associated with type 1 diabetes mellitus (CMS/HCC)   • Tobacco abuse disorder   • Vitamin D deficiency   • B12 deficiency   • Acute respiratory failure with hypoxia and hypercapnia (CMS/HCC)   • Acute kidney injury (CMS/HCC)   • Hyperkalemia   • Anemia   • History of recent pneumonia   • Diabetic ulcer of toe of left foot associated with type 1 diabetes mellitus, limited to breakdown of skin (CMS/HCC)   • Chronic kidney disease   • Acute pulmonary edema (CMS/HCC)         Presenting Problem/History of Present Illness:  Acute respiratory failure (CMS/[facility]) [J96.00]     Chief Complaint on Day of Discharge:   No complaint    History of Present Illness on Day of Discharge:   The patient is feeling well and back to baseline.  The patient has received notification of chair time available for dialysis Monday, Wednesday and Friday.  He is appropriate for discharge home.    Hospital Course  Patient is a 33-year-old  male with past medical history significant for type 1 diabetes complicated by peripheral neuropathy, hypertension, and reported stage III chronic kidney disease that presented to our hospital today secondary to shortness of breath and acute respiratory failure.  Family at bedside reports the patient was just discharged from [facility] yesterday on March 9, 2019 with a diagnosis of pneumonia.  They report that he was initially evaluated in their emergency room, was admitted for approximately 3 days, was placed on antibiotic therapy and breathing treatments for pneumonia, and was given quite a bit of intravenous fluids.  He was subsequently  "discharged home yesterday with a prescription for duo nebs.  Patient had progressively worsening shortness of breath even after discharge home.  He was using one of his family members home oxygen, and even despite supplemental oxygen continued to have worsening shortness of breath.  Family at bedside even reports that he was complaining of chest pain and chest pressure.  The presented to our facility for further workup and management, he was found to be in respiratory distress, also with altered mental status, decision was made to go ahead and proceed with intubation and mechanical ventilation.  Patient is currently intubated and sedated and unable to provide additional history.  Family at bedside reports the patient was supposed to be getting a cardiac workup performed in the near future, in addition to a bone marrow biopsy given that \"his white cells were up but his blood cells were down\".  He has not had any new cough or congestion per their report.  No known fevers or chills.  They report that he was recently diagnosed with stage III chronic kidney disease.  They state that he has been a type I diabetic since he was 13 years of age.  They report that he has no sensation in his feet due to neuropathy, does have a wound on his left foot which is being managed in wound care.  They report that he smokes about a pack of cigarettes per day.    Plan:   1.  CT Chest (noncontrast) ordered in ED - pending  2.  ICU admission  3.  Would like to start IV diuretics however BUN 66 and Cr 4.19; obtain records from [facility] including lab studies to assess baseline renal function - patient discharged yesterday  4.  Nephrology consultation  5.  Pulmonary consultation  6.  STAT Echo  7.  Renal US  8.  Empiric IV Vanco and Merrem   9.  Blood and respiratory cultures  10.  Trend cardiac markers  11.  Calcium gluconate, insulin, sodium bicarbonate; repeat BMP this PM  12.  IV Pepcid/Lovenox PPx  13.  Check procalcitonin; urine Legionella " and Strep pneumo Ag  14.  Scheduled Duonebs  15.  Patient will need close BS monitoring; type I DM  16.  Critically ill; workup ongoing    Pulmonology was consulted for ventilator management.  The patient was started on broad-spectrum antibiotics with vancomycin, meropenem and doxycycline.  On the day after admission doxycycline was discontinued.  Nephrology was consulted as the patient was found to be in acute renal failure with hyperkalemia.  IV fluids were held and the patient was given Kayexalate without significant change in potassium level.  Nephrology placed the patient on a bicarbonate drip secondary to metabolic acidosis.  The patient's renal function continued to decline and vascular surgery was consulted for permacath placement for the purposes of dialysis.  Dialysis was initiated on the day after permacath placement and was done daily for 2 days.  Nephrology continued to manage the patient's fluid balance.  Urinary antigens were negative.  Sputum cultures showed no growth.  The patient was extubated on the fourth hospital day, 3/14/19 with oxygen saturations well-maintained with 1 L per nasal cannula.  The patient was screened by speech therapy 2 days after extubation and his diet was advanced per their evaluation.  The patient continued to receive dialysis on Monday Wednesday and Friday throughout his hospitalization.  He was cleared for a diet on 3/17/19.  His family brought him his insulin pump and he was allowed to manage his blood sugars on his own.  The patient is feeling well and back to baseline.  The patient has received notification of chair time available for dialysis Monday, Wednesday and Friday.  He has been up, fully dressed and ambulating in the halls without difficulty for the past 2 days.  He is appropriate for discharge home.    Procedures Performed:   Endotracheal intubation  Right internal jugular permacath placement    Consults:   Pulmonology:    New problem (to me), with additional  workup planned: Acute respiratory failure with abnormal chest CT  New problem (to me), no additional workup planned: Type 1 diabetes mellitus treated by endocrinology, poorly controlled and possibly poorly compliant.  Canceled office visits noted  Other problems either stable, failing to improve or worsenin. Suspected congestive heart failure or fluid overload related to acute on chronic renal failure, with elevated BNP  2. Bilateral pleural effusions likely related to #1  3. Possible incompletely controlled pneumonia or new pneumonia or recent pneumonia with resolution but persistent opacities which have not had time to clear  4. History of vitamin D deficiency  5. Hyperkalemia likely related to renal failure  6. Metabolic acidosis     Recommend/plan:   Ventilator order set, including intravenous narcotics, benzodiazepines (that is controlled drugs) for sedation and ventilator tolerance  Chest X-ray in the morning tomorrow  Arterial blood gas analysis in the morning tomorrow  Additional plan:  · We will adjust the ventilator to better match his ventilatory needs.  Current respiratory rate is 19 but he has an inadequate respiratory response to acute metabolic acidosis.  This may in part be related to sedation but could be also related to respiratory muscle fatigue.  Doubt that he has underlying chronic pulmonary disease  · DVT prophylaxis  · Stress ulcer prophylaxis  · Discussed with Dr. Castillo  · Continue antibiotics IV, broad-spectrum  · Hold off on addition of quinolones or macrolides due to risk for long QT interval in a patient with undefined cardiac physiology at present, with therapy associated with potential arrhythmia.  We will add doxycycline for atypical coverage.  · Legionella urinary antigen  · Respiratory culture  · Defer diabetic management to medicine service     Thank you for this consult.  We will follow along.  Electronically signed by Sascha Chiang MD on 3/10/2019 at 9:42  AM    Nephrology:  Assessment   1.  Acute kidney injury; likely ATN--renal function declining  2.  Baseline chronic kidney disease stage 4  3.  Hyperkalemia  4.  Acute hypoxic respiratory failure  5.  Metabolic acidosis  6.  Anemia of chronic kidney disease (s/p 2 units PRBC last week at [facility])  7.  Type 1 diabetes with nephropathy  8.  Essential hypertension  9.  Clinically volume overloaded     Plan:  1.  Hold IV fluids  2.  Lasix 40 mg IV once now  3.  Kayexalate via NG tube once now  4.  Workup ongoing; renal US and urine studies pending  5.  Further assessment and plan pending Dr Snyder's evaluation of patient        Thank you for the consult, we appreciate the opportunity to provide care to your patients.  Feel free to contact me if I can be of any further assistance.       Aleksandar Conteh, APRN    Vascular surgery:  Impression:    Acute respiratory failure with hypoxia and hypercapnia (CMS/HCC)    Type 1 diabetes, uncontrolled, with gastroparesis (CMS/HCC)    Diabetic polyneuropathy associated with type 1 diabetes mellitus (CMS/HCC)    Tobacco abuse disorder    Acute systolic congestive heart failure (CMS/HCC)    Acute kidney injury (CMS/HCC)    Hyperkalemia    Anemia    History of recent pneumonia    Diabetic ulcer of toe of left foot associated with type 1 diabetes mellitus, limited to breakdown of skin (CMS/HCC)        Plan: After thoroughly evaluating Jose Shah, I believe the best course of action is to proceed with PermCath placement by Dr. Avila tomorrow.The risks and benefits were explained at great length to the patient which include but are not limited to bleeding, infection, vessel damage, nerve damage and pneumothorax. The family understands the risks and wishes for me to proceed.    Thank you for allowing me to participate in the care of your patient.  Please do not hesitate to call with any questions or concerns.          Diann Naranjo, APRN     Pertinent Test Results:    Basic Metabolic  Panel [[id number]]  (Abnormal) Collected:  03/19/19 0437    Specimen:  Blood Updated:  03/19/19 0557     Glucose 282 mg/dL      BUN 45 mg/dL      Creatinine 3.23 mg/dL      Sodium 136 mmol/L      Potassium 4.6 mmol/L      Chloride 100 mmol/L      CO2 26.0 mmol/L      Calcium 7.9 mg/dL      eGFR Non African Amer 22 mL/min/1.73      BUN/Creatinine Ratio 13.9     Anion Gap 10.0 mmol/L     Comprehensive Metabolic Panel [[id number]]  (Abnormal) Collected:  03/18/19 0432    Specimen:  Blood Updated:  03/18/19 0646     Glucose 170 mg/dL      BUN 59 mg/dL      Creatinine 3.95 mg/dL      Sodium 139 mmol/L      Potassium 4.6 mmol/L      Chloride 100 mmol/L      CO2 28.0 mmol/L      Calcium 8.4 mg/dL      Total Protein 5.9 g/dL      Albumin 2.90 g/dL      ALT (SGPT) 18 U/L      AST (SGOT) 25 U/L      Alkaline Phosphatase 93 U/L      Total Bilirubin 0.5 mg/dL      eGFR Non African Amer 18 mL/min/1.73      Globulin 3.0 gm/dL      A/G Ratio 1.0 g/dL      BUN/Creatinine Ratio 14.9     Anion Gap 11.0 mmol/L     CBC Auto Differential [[id number]]  (Abnormal) Collected:  03/18/19 0432    Specimen:  Blood Updated:  03/18/19 0457     WBC 6.19 10*3/mm3      RBC 3.77 10*6/mm3      Hemoglobin 11.5 g/dL      Hematocrit 34.6 %      MCV 91.8 fL      MCH 30.5 pg      MCHC 33.2 g/dL      RDW 14.8 %      RDW-SD 49.9 fl      MPV 9.2 fL      Platelets 265 10*3/mm3      Neutrophil % 57.9 %      Lymphocyte % 27.0 %      Monocyte % 10.3 %      Eosinophil % 3.6 %      Basophil % 1.0 %      Immature Grans % 0.2 %      Neutrophils, Absolute 3.59 10*3/mm3      Lymphocytes, Absolute 1.67 10*3/mm3      Monocytes, Absolute 0.64 10*3/mm3      Eosinophils, Absolute 0.22 10*3/mm3      Basophils, Absolute 0.06 10*3/mm3      Immature Grans, Absolute 0.01 10*3/mm3      nRBC 0.0 /100 WBC     Comprehensive Metabolic Panel [[id number]]  (Abnormal) Collected:  03/17/19 0504    Specimen:  Blood Updated:  03/17/19 0632     Glucose 100 mg/dL      BUN 53 mg/dL       Creatinine 4.07 mg/dL      Sodium 139 mmol/L      Potassium 3.8 mmol/L      Chloride 102 mmol/L      CO2 29.0 mmol/L      Calcium 8.4 mg/dL      Total Protein 5.1 g/dL      Albumin 2.80 g/dL      ALT (SGPT) 20 U/L      AST (SGOT) 20 U/L      Alkaline Phosphatase 82 U/L      Total Bilirubin 0.4 mg/dL      eGFR Non African Amer 17 mL/min/1.73      Globulin 2.3 gm/dL      A/G Ratio 1.2 g/dL      BUN/Creatinine Ratio 13.0     Anion Gap 8.0 mmol/L     CBC Auto Differential [[id number]]  (Abnormal) Collected:  03/17/19 0504    Specimen:  Blood Updated:  03/17/19 0534     WBC 7.33 10*3/mm3      RBC 3.34 10*6/mm3      Hemoglobin 10.1 g/dL      Hematocrit 30.4 %      MCV 91.0 fL      MCH 30.2 pg      MCHC 33.2 g/dL      RDW 14.7 %      RDW-SD 48.7 fl      MPV 8.8 fL      Platelets 241 10*3/mm3      Neutrophil % 60.7 %      Lymphocyte % 22.6 %      Monocyte % 11.6 %      Eosinophil % 3.8 %      Basophil % 1.0 %      Immature Grans % 0.3 %      Neutrophils, Absolute 4.45 10*3/mm3      Lymphocytes, Absolute 1.66 10*3/mm3      Monocytes, Absolute 0.85 10*3/mm3      Eosinophils, Absolute 0.28 10*3/mm3      Basophils, Absolute 0.07 10*3/mm3      Immature Grans, Absolute 0.02 10*3/mm3      nRBC 0.0 /100 WBC     Basic Metabolic Panel [[id number]]  (Abnormal) Collected:  03/16/19 1507    Specimen:  Blood Updated:  03/16/19 1539     Glucose 349 mg/dL      BUN 47 mg/dL      Creatinine 3.74 mg/dL      Sodium 136 mmol/L      Potassium 4.5 mmol/L      Chloride 96 mmol/L      CO2 23.0 mmol/L      Calcium 8.3 mg/dL      eGFR Non African Amer 19 mL/min/1.73      BUN/Creatinine Ratio 12.6     Anion Gap 17.0 mmol/L     Comprehensive Metabolic Panel [[id number]]  (Abnormal) Collected:  03/16/19 0514    Specimen:  Blood Updated:  03/16/19 0604     Glucose 261 mg/dL      BUN 38 mg/dL      Creatinine 3.18 mg/dL      Sodium 136 mmol/L      Potassium 4.9 mmol/L      Chloride 98 mmol/L      CO2 13.0 mmol/L      Calcium 8.5 mg/dL      Total Protein 5.4  g/dL      Albumin 3.00 g/dL      ALT (SGPT) 21 U/L      AST (SGOT) 26 U/L      Alkaline Phosphatase 93 U/L      Total Bilirubin 0.5 mg/dL      eGFR Non African Amer 23 mL/min/1.73      Globulin 2.4 gm/dL      A/G Ratio 1.3 g/dL      BUN/Creatinine Ratio 11.9     Anion Gap 25.0 mmol/L     CBC Auto Differential [[id number]]  (Abnormal) Collected:  03/16/19 0514    Specimen:  Blood Updated:  03/16/19 0531     WBC 8.17 10*3/mm3      RBC 3.61 10*6/mm3      Hemoglobin 10.8 g/dL      Hematocrit 33.0 %      MCV 91.4 fL      MCH 29.9 pg      MCHC 32.7 g/dL      RDW 14.9 %      RDW-SD 50.4 fl      MPV 9.3 fL      Platelets 267 10*3/mm3      Neutrophil % 76.4 %      Lymphocyte % 14.3 %      Monocyte % 7.5 %      Eosinophil % 0.4 %      Basophil % 1.0 %      Immature Grans % 0.4 %      Neutrophils, Absolute 6.25 10*3/mm3      Lymphocytes, Absolute 1.17 10*3/mm3      Monocytes, Absolute 0.61 10*3/mm3      Eosinophils, Absolute 0.03 10*3/mm3      Basophils, Absolute 0.08 10*3/mm3      Immature Grans, Absolute 0.03 10*3/mm3      nRBC 0.0 /100 WBC     Comprehensive Metabolic Panel [[id number]]  (Abnormal) Collected:  03/15/19 0410    Specimen:  Blood Updated:  03/15/19 0454     Glucose 168 mg/dL      BUN 35 mg/dL      Creatinine 3.04 mg/dL      Sodium 141 mmol/L      Potassium 4.5 mmol/L      Chloride 104 mmol/L      CO2 24.0 mmol/L      Calcium 8.3 mg/dL      Total Protein 5.0 g/dL      Albumin 2.50 g/dL      ALT (SGPT) 23 U/L      AST (SGOT) 25 U/L      Alkaline Phosphatase 85 U/L      Total Bilirubin 0.6 mg/dL      eGFR Non African Amer 24 mL/min/1.73      Globulin 2.5 gm/dL      A/G Ratio 1.0 g/dL      BUN/Creatinine Ratio 11.5     Anion Gap 13.0 mmol/L     CBC Auto Differential [[id number]]  (Abnormal) Collected:  03/15/19 0410    Specimen:  Blood Updated:  03/15/19 0432     WBC 9.02 10*3/mm3      RBC 3.40 10*6/mm3      Hemoglobin 10.3 g/dL      Hematocrit 31.7 %      MCV 93.2 fL      MCH 30.3 pg      MCHC 32.5 g/dL      RDW 15.4  %      RDW-SD 52.9 fl      MPV 8.9 fL      Platelets 232 10*3/mm3      Neutrophil % 77.3 %      Lymphocyte % 12.7 %      Monocyte % 7.0 %      Eosinophil % 2.0 %      Basophil % 0.8 %      Immature Grans % 0.2 %      Neutrophils, Absolute 6.97 10*3/mm3      Lymphocytes, Absolute 1.15 10*3/mm3      Monocytes, Absolute 0.63 10*3/mm3      Eosinophils, Absolute 0.18 10*3/mm3      Basophils, Absolute 0.07 10*3/mm3      Immature Grans, Absolute 0.02 10*3/mm3      nRBC 0.0 /100 WBC     Comprehensive Metabolic Panel [[id number]]  (Abnormal) Collected:  03/14/19 0253    Specimen:  Blood Updated:  03/14/19 0354     Glucose 178 mg/dL      BUN 45 mg/dL      Creatinine 3.26 mg/dL      Sodium 141 mmol/L      Potassium 4.9 mmol/L      Chloride 105 mmol/L      CO2 20.0 mmol/L      Calcium 8.3 mg/dL      Total Protein 4.9 g/dL      Albumin 2.50 g/dL      ALT (SGPT) 17 U/L      AST (SGOT) 20 U/L      Alkaline Phosphatase 76 U/L      Total Bilirubin 0.4 mg/dL      eGFR Non African Amer 22 mL/min/1.73      Globulin 2.4 gm/dL      A/G Ratio 1.0 g/dL      BUN/Creatinine Ratio 13.8     Anion Gap 16.0 mmol/L     CBC Auto Differential [[id number]]  (Abnormal) Collected:  03/14/19 0253    Specimen:  Blood Updated:  03/14/19 0339     WBC 5.38 10*3/mm3      RBC 2.98 10*6/mm3      Hemoglobin 8.8 g/dL      Hematocrit 27.8 %      MCV 93.3 fL      MCH 29.5 pg      MCHC 31.7 g/dL      RDW 15.5 %      RDW-SD 53.3 fl      MPV 9.4 fL      Platelets 229 10*3/mm3      Neutrophil % 61.0 %      Lymphocyte % 24.3 %      Monocyte % 8.7 %      Eosinophil % 4.5 %      Basophil % 1.3 %      Immature Grans % 0.2 %      Neutrophils, Absolute 3.28 10*3/mm3      Lymphocytes, Absolute 1.31 10*3/mm3      Monocytes, Absolute 0.47 10*3/mm3      Eosinophils, Absolute 0.24 10*3/mm3      Basophils, Absolute 0.07 10*3/mm3      Immature Grans, Absolute 0.01 10*3/mm3      nRBC 0.0 /100 WBC     Blood Gas, Arterial [[id number]]  (Abnormal) Collected:  03/14/19 0300    Specimen:   Arterial Blood Updated:  03/14/19 0306     Site Right Brachial     Ji's Test N/A     pH, Arterial 7.366 pH units      pCO2, Arterial 38.6 mm Hg      pO2, Arterial 96.1 mm Hg      HCO3, Arterial 22.1 mmol/L      Base Excess, Arterial -3.0 mmol/L      Comment: 84 Value below reference range        O2 Saturation, Arterial 97.9 %      Temperature 37.0 C      Barometric Pressure for Blood Gas 743 mmHg      Modality Ventilator     FIO2 60 %      Ventilator Mode AC     Set Tidal Volume 550     Set Mech Resp Rate 16.0     PEEP 6.0     Collected by [id number]     Comment: Meter: [id number]     :  [id number]       Hepatitis Panel, Acute [[id number]]  (Normal) Collected:  03/13/19 1458    Specimen:  Blood Updated:  03/13/19 1626     HCV S/C Ratio 0.01     Hepatitis C Ab Negative     Hep A IgM Negative     Hep B C IgM Negative     Hepatitis B Surface Ag Negative    Hepatitis B Surface Antibody [[id number]] Collected:  03/13/19 1458    Specimen:  Blood Updated:  03/13/19 1626     Hepatitis Bs Ab Index 14.80     Hep B S Ab Immune    POC Glucose Once [[id number]]  (Abnormal) Collected:  03/13/19 1130    Specimen:  Blood Updated:  03/13/19 1143     Glucose 226 mg/dL     Comprehensive Metabolic Panel [[id number]]  (Abnormal) Collected:  03/12/19 0335    Specimen:  Blood Updated:  03/12/19 0411     Glucose 212 mg/dL      BUN 66 mg/dL      Creatinine 4.51 mg/dL      Sodium 140 mmol/L      Potassium 5.9 mmol/L      Chloride 110 mmol/L      CO2 21.0 mmol/L      Calcium 8.5 mg/dL      Total Protein 4.6 g/dL      Albumin 2.50 g/dL      ALT (SGPT) 28 U/L      AST (SGOT) 18 U/L      Alkaline Phosphatase 84 U/L      Total Bilirubin 0.5 mg/dL      eGFR Non African Amer 15 mL/min/1.73      Globulin 2.1 gm/dL      A/G Ratio 1.2 g/dL      BUN/Creatinine Ratio 14.6     Anion Gap 9.0 mmol/L     Narrative:       GFR Normal >60  Chronic Kidney Disease <60  Kidney Failure <15    Phosphorus [[id number]]  (Abnormal) Collected:  03/12/19 0335     Specimen:  Blood Updated:  03/12/19 0411     Phosphorus 6.3 mg/dL     Magnesium [[id number]]  (Normal) Collected:  03/12/19 0335    Specimen:  Blood Updated:  03/12/19 0411     Magnesium 2.2 mg/dL     Hemoglobin A1c [[id number]] Collected:  03/11/19 0603    Specimen:  Blood Updated:  03/11/19 0718     Hemoglobin A1C 5.3 %     Narrative:       Less than 6.0           Non-Diabetic Range  6.0-7.0                 ADA Therapeutic Target  Greater than 7.0        Action Suggested    CBC (No Diff) [[id number]]  (Abnormal) Collected:  03/11/19 0603    Specimen:  Blood Updated:  03/11/19 0629     WBC 6.14 10*3/mm3      RBC 2.64 10*6/mm3      Hemoglobin 8.1 g/dL      Hematocrit 25.1 %      MCV 95.1 fL      MCH 30.7 pg      MCHC 32.3 g/dL      RDW 16.6 %      RDW-SD 58.0 fl      MPV 9.9 fL      Platelets 161 10*3/mm3     Blood Gas, Arterial [[id number]]  (Abnormal) Collected:  03/11/19 0505    Specimen:  Arterial Blood Updated:  03/11/19 0511     Site Right Radial     Ji's Test Positive     pH, Arterial 7.335 pH units      Comment: 84 Value below reference range        pCO2, Arterial 41.1 mm Hg      pO2, Arterial 80.9 mm Hg      Comment: 84 Value below reference range        HCO3, Arterial 21.9 mmol/L      Base Excess, Arterial -3.7 mmol/L      Comment: 84 Value below reference range        O2 Saturation, Arterial 96.5 %      Temperature 37.0 C      Barometric Pressure for Blood Gas 758 mmHg      Modality Ventilator     FIO2 50 %      Ventilator Mode AC     Set Tidal Volume 550     Set Mech Resp Rate 16.0     PEEP 6.0     Collected by [id number]    Comprehensive Metabolic Panel [[id number]]  (Abnormal) Collected:  03/11/19 0340    Specimen:  Blood Updated:  03/11/19 0421     Glucose 188 mg/dL      BUN 67 mg/dL      Creatinine 4.41 mg/dL      Sodium 138 mmol/L      Potassium 5.8 mmol/L      Chloride 109 mmol/L      CO2 21.0 mmol/L      Calcium 8.5 mg/dL      Total Protein 4.3 g/dL      Albumin 2.20 g/dL      ALT (SGPT) 32 U/L      AST  (SGOT) 20 U/L      Alkaline Phosphatase 69 U/L      Total Bilirubin 0.3 mg/dL      eGFR Non African Amer 16 mL/min/1.73      Globulin 2.1 gm/dL      A/G Ratio 1.0 g/dL      BUN/Creatinine Ratio 15.2     Anion Gap 8.0 mmol/L     S. Pneumo Ag Urine or CSF - Urine, Urine, Clean Catch [[id number]]  (Normal) Collected:  03/10/19 1008    Specimen:  Urine, Clean Catch Updated:  03/10/19 1257     Strep Pneumo Ag Negative    Legionella Antigen, Urine - Urine, Urine, Clean Catch [[id number]]  (Normal) Collected:  03/10/19 1008    Specimen:  Urine, Clean Catch Updated:  03/10/19 1255     LEGIONELLA ANTIGEN, URINE Negative    Troponin [[id number]]  (Normal) Collected:  03/10/19 1152    Specimen:  Blood Updated:  03/10/19 1239     Troponin I 0.019 ng/mL     Procalcitonin [[id number]]  (Normal) Collected:  03/10/19 0555    Specimen:  Blood Updated:  03/10/19 0939     Procalcitonin <0.25 ng/mL     Blood Gas, Arterial [[id number]]  (Abnormal) Collected:  03/10/19 0930    Specimen:  Arterial Blood Updated:  03/10/19 0937     Site Right Radial     Ji's Test Positive     pH, Arterial 7.294 pH units      Comment: 84 Value below reference range        pCO2, Arterial 42.5 mm Hg      pO2, Arterial 90.2 mm Hg      HCO3, Arterial 20.6 mmol/L      Base Excess, Arterial -5.6 mmol/L      Comment: 84 Value below reference range        O2 Saturation, Arterial 97.1 %      Temperature 37.0 C      Barometric Pressure for Blood Gas 754 mmHg      Modality Ventilator     FIO2 100 %      Ventilator Mode AC     Set Tidal Volume 550     Set [facility] Resp Rate 16.0     PEEP 8.0     Collected by [id number]     Comment: Meter: [id number]     :  [id number]       BNP [[id number]]  (Abnormal) Collected:  03/10/19 0555    Specimen:  Blood Updated:  03/10/19 0709     proBNP 18,300.0 pg/mL     Troponin [[id number]]  (Normal) Collected:  03/10/19 0555    Specimen:  Blood Updated:  03/10/19 0709     Troponin I 0.016 ng/mL     Comprehensive Metabolic Panel  [[id number]]  (Abnormal) Collected:  03/10/19 0555    Specimen:  Blood Updated:  03/10/19 0705     Glucose 182 mg/dL      BUN 66 mg/dL      Creatinine 4.19 mg/dL      Sodium 140 mmol/L      Potassium 5.8 mmol/L      Chloride 108 mmol/L      CO2 23.0 mmol/L      Calcium 8.9 mg/dL      Total Protein 6.5 g/dL      Albumin 3.40 g/dL      ALT (SGPT) 34 U/L      AST (SGOT) 49 U/L      Alkaline Phosphatase 94 U/L      Total Bilirubin 0.5 mg/dL      eGFR Non African Amer 16 mL/min/1.73      Globulin 3.1 gm/dL      A/G Ratio 1.1 g/dL      BUN/Creatinine Ratio 15.8     Anion Gap 9.0 mmol/L     Urinalysis With Culture If Indicated - Urine, Catheter [[id number]]  (Abnormal) Collected:  03/10/19 0650    Specimen:  Urine, Catheter Updated:  03/10/19 0703     Color, UA Yellow     Appearance, UA Clear     pH, UA 5.5     Specific Gravity, UA 1.019     Glucose,  mg/dL (2+)     Ketones, UA Negative     Bilirubin, UA Negative     Blood, UA Small (1+)     Protein, UA >=300 mg/dL (3+)     Leuk Esterase, UA Negative     Nitrite, UA Negative     Urobilinogen, UA 0.2 E.U./dL    Urinalysis, Microscopic Only - Urine, Catheter [[id number]]  (Abnormal) Collected:  03/10/19 0650    Specimen:  Urine, Catheter Updated:  03/10/19 0703     RBC, UA 13-20 /HPF      WBC, UA 3-5 /HPF      Bacteria, UA None Seen /HPF      Squamous Epithelial Cells, UA 0-2 /HPF      Hyaline Casts, UA 3-6 /LPF      Methodology Automated Microscopy    Magnesium [[id number]]  (Normal) Collected:  03/10/19 0555    Specimen:  Blood Updated:  03/10/19 0658     Magnesium 2.2 mg/dL     Phosphorus [[id number]]  (Abnormal) Collected:  03/10/19 0555    Specimen:  Blood Updated:  03/10/19 0658     Phosphorus 6.4 mg/dL     Lactic Acid, Plasma [[id number]]  (Normal) Collected:  03/10/19 0555    Specimen:  Blood Updated:  03/10/19 0655     Lactate 0.9 mmol/L     Protime-INR [[id number]]  (Normal) Collected:  03/10/19 0555    Specimen:  Blood Updated:  03/10/19 0647     Protime 12.6  Seconds      INR 0.92    aPTT [[id number]]  (Normal) Collected:  03/10/19 0555    Specimen:  Blood Updated:  03/10/19 0647     PTT 30.7 seconds     CBC Auto Differential [[id number]]  (Abnormal) Collected:  03/10/19 0555    Specimen:  Blood Updated:  03/10/19 0639     WBC 11.35 10*3/mm3      RBC 3.79 10*6/mm3      Hemoglobin 11.4 g/dL      Hematocrit 35.3 %      MCV 93.1 fL      MCH 30.1 pg      MCHC 32.3 g/dL      RDW 17.2 %      RDW-SD 58.0 fl      MPV 10.4 fL      Platelets 234 10*3/mm3      Neutrophil % 60.0 %      Lymphocyte % 31.5 %      Monocyte % 4.7 %      Eosinophil % 2.5 %      Basophil % 1.0 %      Immature Grans % 0.3 %      Neutrophils, Absolute 6.82 10*3/mm3      Lymphocytes, Absolute 3.58 10*3/mm3      Monocytes, Absolute 0.53 10*3/mm3      Eosinophils, Absolute 0.28 10*3/mm3      Basophils, Absolute 0.11 10*3/mm3      Immature Grans, Absolute 0.03 10*3/mm3      nRBC 0.0 /100 WBC     Blood Gas, Arterial [[id number]]  (Abnormal) Collected:  03/10/19 0618    Specimen:  Arterial Blood Updated:  03/10/19 0621     Site Right Radial     Ji's Test Positive     pH, Arterial 7.226 pH units      Comment: 84 Value below reference range        pCO2, Arterial 53.0 mm Hg      Comment: 83 Value above reference range        pO2, Arterial 77.2 mm Hg      Comment: 84 Value below reference range        HCO3, Arterial 22.0 mmol/L      Base Excess, Arterial -5.7 mmol/L      Comment: 84 Value below reference range        O2 Saturation, Arterial 93.1 %      Comment: 84 Value below reference range        Temperature 37.0 C      Barometric Pressure for Blood Gas 751 mmHg      Modality Ventilator     FIO2 100 %      Ventilator Mode AC     Set Tidal Volume 550     Set Mech Resp Rate 16.0     PEEP 8.0     Collected by [id number]     Comment: Meter: [id number]     :  [id number]        Imaging Results (last 7 days)     Procedure Component Value Units Date/Time    IR Insert Tunneled CV Catheter Without Port 5 Plus  [[id number]] Collected:  03/14/19 0703     Updated:  03/15/19 1317    Narrative:       Performed by Dr. Avila. Please see procedure note.  This report was finalized on 03/15/2019 13:14 by Dr. Manjit Avila MD.    FL C Arm During Surgery [[id number]] Collected:  03/14/19 0703     Updated:  03/15/19 1317    Narrative:       Performed by Dr. Avila. Please see procedure note.  This report was finalized on 03/15/2019 13:14 by Dr. Manjit Avila MD.    XR Chest 1 View [[id number]] Collected:  03/14/19 0714     Updated:  03/14/19 0718    Narrative:       EXAMINATION:   XR CHEST 1 VW-  3/14/2019 7:14 AM CDT     HISTORY: Respiratory failure     Frontal upright radiograph of the chest 3/14/2019 4:09 AM CDT     COMPARISON: March 13, 2019.     FINDINGS:   Moderate to large bilateral pleural effusions are present. The lung  bases are obscured by the large pleural effusions.. Cardiac silhouettes  enlarged. Right internal jugular double lumen catheter satisfactorily  positioned. Endotracheal tube is noted..      The osseous structures and surrounding soft tissues demonstrate no acute  abnormality.       Impression:       1. Large bilateral pleural effusions. There is no improvement from March 13, 2019.        This report was finalized on 03/14/2019 07:15 by Dr. Natalio Uriostegui MD.    XR Chest 1 View [[id number]] Collected:  03/13/19 0736     Updated:  03/13/19 0742    Narrative:       EXAMINATION: XR CHEST 1 VW- 3/13/2019 7:36 AM CDT     HISTORY: Intubated Patient; J96.01-Acute respiratory failure with  hypoxia; J96.02-Acute respiratory failure with hypercapnia;  N18.9-Chronic kidney disease, unspecified; E87.5-Hyperkalemia;  E87.70-Fluid overload, unspecified; D64.9-Anemia, unspecified;  N17.9-Acute kidney failure, unspecified.     REPORT: Comparison is made with the chest x-ray 3/12/2019.     The endotracheal tube has been retracted slightly, the tip projects over  the midline trachea at the T2-3 level. There is haziness  "over the lung  bases with diffuse atelectasis and bilateral pleural effusions likely.  No pneumothorax is identified. The size appears normal. There is no  significant interval change.       Impression:       1. Stable appearance of the lungs with probable bilateral effusions and  extensive volume loss in the lung bases.  2. Slight retraction of the endotracheal tube.  This report was finalized on 03/13/2019 07:39 by Dr. Kenny Weathers MD.          Condition on Discharge:    Stable    Physical Exam on Discharge:  /97 (BP Location: Right arm, Patient Position: Lying)   Pulse 87   Temp 98.7 °F (37.1 °C) (Oral)   Resp 20   Ht 188 cm (74\")   Wt 85.3 kg (188 lb 2 oz)   SpO2 97%   BMI 24.15 kg/m²   Physical Exam  Constitutional: He appears well-developed and well-nourished.    Patient is fully dressed and walking in the halls and in his room.  He looks quite good today.  He is currently on room air with good oxygen saturations.  Head: Normocephalic and atraumatic.   Eyes: Conjunctivae are normal. Pupils are equal, round, and reactive to light.   Neck: Neck supple. No JVD present.   Cardiovascular: Normal rate, regular rhythm, normal heart sounds and intact distal pulses. Exam reveals no gallop and no friction rub. No murmur heard.  Pulmonary/Chest: He has no rales. Off oxygen.    Abdominal: Soft. Bowel sounds are normal. He exhibits no distension. There is no tenderness. There is no rebound and no guarding.   Musculoskeletal: Normal range of motion. He exhibits edema (improving). He exhibits no tenderness or deformity.  Right IJ permacath.  Neurological: Awake and alert. He displays normal reflexes. He exhibits normal muscle tone.    Skin: Skin is warm and dry. No rash noted. He has an excoriated ulcer on the left foot on the dorsum involving the fourth toe predominantly.      Discharge Disposition:  Home or Self Care    Discharge Medications:     Discharge Medications      New Medications      Instructions " Start Date   sevelamer 800 MG tablet  Commonly known as:  RENVELA   800 mg, Oral, 3 Times Daily With Meals         Continue These Medications      Instructions Start Date   Alcohol Wipes 70 % pads   Use 4 x daily      amLODIPine 5 MG tablet  Commonly known as:  NORVASC   5 mg, Oral, Daily      atorvastatin 80 MG tablet  Commonly known as:  LIPITOR   80 mg, Oral, Daily      Blood Glucose Monitoring Suppl w/Device kit   USE AS INDICATED, ANY MONITOR      Blood Glucose Test strip   Use 4 x daily, use any brand covered by insurance or same brand as before       famotidine 20 MG tablet  Commonly known as:  PEPCID   20 mg, Oral, 2 Times Daily      hydrALAZINE 25 MG tablet  Commonly known as:  APRESOLINE   25 mg, Oral, Every 6 Hours PRN      insulin aspart 100 UNIT/ML injection  Commonly known as:  NOVOLOG   USE AS DIRECTED UP  UNITS DAILY      Lancets 30G misc   USE 4 X DAILY      Lancing Device misc   USE AS INDICATED TO CORRELATE WITH STRIPS AND METER      metoclopramide 10 MG tablet  Commonly known as:  REGLAN   10 mg, Oral, 3 Times Daily Before Meals      metoprolol tartrate 50 MG tablet  Commonly known as:  LOPRESSOR   50 mg, Oral, 2 Times Daily      Urine Glucose-Ketones Test strip   1 each, In Vitro, As Needed         Stop These Medications    CloNIDine 0.1 MG tablet  Commonly known as:  CATAPRES     pantoprazole 40 MG EC tablet  Commonly known as:  PROTONIX            Discharge Diet:   Diet Instructions     Diet: Consistent Carbohydrate; Thin      Discharge Diet:  Consistent Carbohydrate    Fluid Consistency:  Thin          Discharge Care Plan / Instructions:   Discharge home with outpatient dialysis on Monday, Wednesday and Friday    Activity at Discharge:   Activity Instructions     Activity as Tolerated            Follow-up Appointments:  Follow-up with family physician next week       Damon Hargrove DO  03/19/19  10:50 AM    Time: Discharge Over 30 min    Please note that portions of this note may  have been completed with a voice recognition program. Efforts were made to edit the dictations, but occasionally words are mistranscribed.

## 2019-03-19 NOTE — PROGRESS NOTES
Discharge Planning Assessment   [place]     Patient Name: Jose Shah  MRN: [id number]  Today's Date: 3/19/2019    Admit Date: 3/10/2019    Discharge Needs Assessment     Row Name 03/19/19 0837       Living Environment    Lives With  parent(s)    Name(s) of Who Lives With Patient  Ami    Current Living Arrangements  home/apartment/condo    Primary Care Provided by  self    Provides Primary Care For  no one    Family Caregiver if Needed  parent(s)    Quality of Family Relationships  supportive;involved;helpful    Able to Return to Prior Arrangements  yes       Resource/Environmental Concerns    Resource/Environmental Concerns  none    Transportation Concerns  car, none       Transition Planning    Patient/Family Anticipates Transition to  home with family    Patient/Family Anticipated Services at Transition  none    Transportation Anticipated  family or friend will provide       Discharge Needs Assessment    Readmission Within the Last 30 Days  no previous admission in last 30 days    Concerns to be Addressed  denies needs/concerns at this time    Equipment Currently Used at Home  none    Anticipated Changes Related to Illness  none    Equipment Needed After Discharge  none    Outpatient/Agency/Support Group Needs  outpatient hemodialysis        Discharge Plan     Row Name 03/19/19 0841       Plan    Plan  Home    Patient/Family in Agreement with Plan  yes    Plan Comments  Spoke with patient to assess for home needs. Pt lives at home with his mother and plans same.  Left message for Jennifer Rose [phone] from [employer] to find out about dialysis chair time.  Patient says his mother will transport him back and forth for treatments. Will follow.         Destination      No service coordination in this encounter.      Durable Medical Equipment      No service coordination in this encounter.      Dialysis/Infusion      Service Provider Request Status Selected Services Address Phone Number Fax Number     [facility] Pending - No Request Sent N/A [place] [phone] --      Home Medical Care      No service coordination in this encounter.      Community Resources      No service coordination in this encounter.          Demographic Summary    No documentation.       Functional Status    No documentation.       Psychosocial    No documentation.       Abuse/Neglect    No documentation.       Legal    No documentation.       Substance Abuse    No documentation.       Patient Forms    No documentation.           CYNTHIA Culver

## 2019-03-20 ENCOUNTER — READMISSION MANAGEMENT (OUTPATIENT)
Dept: CALL CENTER | Facility: HOSPITAL | Age: 34
End: 2019-03-20

## 2019-03-20 NOTE — OUTREACH NOTE
Prep Survey      Responses   Facility patient discharged from?  [place]   Is patient eligible?  Yes   Discharge diagnosis  T1DM,  CAMILO   Does the patient have one of the following disease processes/diagnoses(primary or secondary)?  Other   Does the patient have Home health ordered?  No   Is there a DME ordered?  No   Comments regarding appointments  see AVS   General alerts for this patient  dialysis MWF   Prep survey completed?  Yes          Jacqueline Schroeder RN

## 2019-03-21 ENCOUNTER — READMISSION MANAGEMENT (OUTPATIENT)
Dept: CALL CENTER | Facility: HOSPITAL | Age: 34
End: 2019-03-21

## 2019-03-21 NOTE — OUTREACH NOTE
Medical Week 1 Survey      Responses   Facility patient discharged from?  [place]   Does the patient have one of the following disease processes/diagnoses(primary or secondary)?  Other   Is there a successful TCM telephone encounter documented?  No   Week 1 attempt successful?  No   Unsuccessful attempts  Attempt 1          Joann Duran RN

## 2019-03-22 ENCOUNTER — READMISSION MANAGEMENT (OUTPATIENT)
Dept: CALL CENTER | Facility: HOSPITAL | Age: 34
End: 2019-03-22

## 2019-03-22 NOTE — OUTREACH NOTE
Medical Week 1 Survey      Responses   Facility patient discharged from?  [place] [facility]   Does the patient have one of the following disease processes/diagnoses(primary or secondary)?  Other   Is there a successful TCM telephone encounter documented?  No   Week 1 attempt successful?  Yes   Call start time  1319   Call end time  1324   Discharge diagnosis  T1DM,  CAMILO   Meds reviewed with patient/caregiver?  Yes   Is the patient having any side effects they believe may be caused by any medication additions or changes?  No   Does the patient have all medications ordered at discharge?  Yes   Is the patient taking all medications as directed (includes completed medication regime)?  Yes   Comments regarding appointments  plans to reschedule PCP appt to accommodate dialysis on M, W and F's   Does the patient have a primary care provider?   Yes   Does the patient have an appointment with their PCP within 7 days of discharge?  Greater than 7 days   What is preventing the patient from scheduling follow up appointments within 7 days of discharge?  Earlier appointment not available   Has the patient kept scheduled appointments due by today?  N/A   Has home health visited the patient within 72 hours of discharge?  N/A   Psychosocial issues?  No   Comments  pt is getting adjusted to new regimen of dialysis, states blood sugars have been stable   Did the patient receive a copy of their discharge instructions?  Yes   Nursing interventions  Reviewed instructions with patient   What is the patient's perception of their health status since discharge?  Improving   Is the patient/caregiver able to teach back signs and symptoms related to disease process for when to call PCP?  Yes   Is the patient/caregiver able to teach back signs and symptoms related to disease process for when to call 911?  Yes   Is the patient/caregiver able to teach back the hierarchy of who to call/visit for symptoms/problems? PCP, Specialist, Home health nurse, Urgent  Care, ED, 911  Yes   Additional teach back comments  pt is new to dialysis and so far has adapted well, will consult with staff at dialysis for questions   Week 1 call completed?  Yes          Cleo Valenzuela RN

## 2019-03-22 NOTE — THERAPY DISCHARGE NOTE
Acute Care - Speech Language Pathology Discharge Summary  [facility]       Patient Name: Jose Shah  : 1985  MRN: [id number]    Today's Date: 3/22/2019                   Admit Date: 3/10/2019      SLP Recommendation and Plan  Regular solids and thin liquids    Visit Dx:    ICD-10-CM ICD-9-CM   1. Acute respiratory failure with hypoxia and hypercapnia (CMS/HCC) J96.01 518.81    J96.02    2. Chronic kidney disease, unspecified CKD stage N18.9 585.9   3. Hyperkalemia E87.5 276.7   4. Hypervolemia, unspecified hypervolemia type E87.70 276.69   5. Anemia, unspecified type D64.9 285.9   6. Acute kidney injury (CMS/[facility]) N17.9 584.9   7. Dysphagia, unspecified type R13.10 787.20               SLP GOALS     Row Name 19 1400             Oral Nutrition/Hydration Goal 1 (SLP)    Oral Nutrition/Hydration Goal 1, SLP  Pt will tolerate least restrictive diet without overt s/s of aspiration.   -MB      Time Frame (Oral Nutrition/Hydration Goal 1, SLP)  by discharge  -MB      Barriers (Oral Nutrition/Hydration Goal 1, SLP)  n/a  -MB      Progress/Outcomes (Oral Nutrition/Hydration Goal 1, SLP)  goal partially met  -MB        User Key  (r) = Recorded By, (t) = Taken By, (c) = Cosigned By    Initials Name Provider Type    Princess Nova CCC-SLP Speech and Language Pathologist                  SLP Discharge Summary  Anticipated Dischage Disposition: home  Reason for Discharge: discharge from this facility  Progress Toward Achieving Short/long Term Goals: goals partially met within established timelines  Discharge Destination: home      Princess Vargas CCC-SLP  3/22/2019

## 2019-03-25 ENCOUNTER — NURSE TRIAGE (OUTPATIENT)
Dept: CALL CENTER | Facility: HOSPITAL | Age: 34
End: 2019-03-25

## 2019-03-25 VITALS — BODY MASS INDEX: 22.47 KG/M2 | WEIGHT: 175 LBS

## 2019-03-25 NOTE — TELEPHONE ENCOUNTER
"This pt. Was discharged from [facility],  3/19, and has had dialysis, he states 3 times, the dialysis nurse asked him today who is taking out the other IV line for him, he stated, he did not know, so they called [facility] line. The line is in his right upper arm.  I sent him to ER to be evaluated    Reason for Disposition  • Nursing judgment or information in reference    Additional Information  • Negative: Nursing judgment, per information in Reference  • Negative: Information only call about a Well Adult (no illness or injury)  • Negative: Nursing judgment or information in reference  • Negative: Nursing judgment or information in reference    Answer Assessment - Initial Assessment Questions  1. REASON FOR CALL: \"What is your main concern right now?\"      He has IV line that needs to be removed  2. ONSET: \"When did the ___ start?\"      Noticed today  3. SEVERITY: \"How bad is the ___?\"      no  4. FEVER: \"Do you have a fever?\"      no  5. OTHER SYMPTOMS: \"Do you have any other new symptoms?\"      Needs IV line removed  6. INTERVENTIONS AND RESPONSE: \"What have you done so far to try to make this better? What medications have you used?\"      no  7. PREGNANCY: \"Is there any chance you are pregnant?\"      no    Protocols used: NO GUIDELINE AVAILABLE-ADULT-      "

## 2019-03-26 ENCOUNTER — HOSPITAL ENCOUNTER (EMERGENCY)
Facility: HOSPITAL | Age: 34
Discharge: HOME | End: 2019-03-26
Admitting: EMERGENCY MEDICINE

## 2019-03-26 VITALS
DIASTOLIC BLOOD PRESSURE: 101 MMHG | HEIGHT: 74 IN | TEMPERATURE: 98.3 F | RESPIRATION RATE: 16 BRPM | WEIGHT: 170 LBS | HEART RATE: 87 BPM | SYSTOLIC BLOOD PRESSURE: 153 MMHG | OXYGEN SATURATION: 99 % | BODY MASS INDEX: 21.82 KG/M2

## 2019-03-26 DIAGNOSIS — T80.1XXA IV INFILTRATION, INITIAL ENCOUNTER: Primary | ICD-10-CM

## 2019-03-26 PROCEDURE — 99283 EMERGENCY DEPT VISIT LOW MDM: CPT

## 2019-03-29 ENCOUNTER — READMISSION MANAGEMENT (OUTPATIENT)
Dept: CALL CENTER | Facility: HOSPITAL | Age: 34
End: 2019-03-29

## 2019-03-29 NOTE — OUTREACH NOTE
Medical Week 2 Survey      Responses   Facility patient discharged from?  [place]   Does the patient have one of the following disease processes/diagnoses(primary or secondary)?  Other   Week 2 attempt successful?  No   Unsuccessful attempts  Attempt 1          Joann Duran RN

## 2019-04-01 ENCOUNTER — READMISSION MANAGEMENT (OUTPATIENT)
Dept: CALL CENTER | Facility: HOSPITAL | Age: 34
End: 2019-04-01

## 2019-04-01 NOTE — OUTREACH NOTE
Medical Week 2 Survey      Responses   Facility patient discharged from?  [place]   Does the patient have one of the following disease processes/diagnoses(primary or secondary)?  Other   Week 2 attempt successful?  No   Unsuccessful attempts  Attempt 2          Bárbara Pennington RN

## 2019-04-03 ENCOUNTER — READMISSION MANAGEMENT (OUTPATIENT)
Dept: CALL CENTER | Facility: HOSPITAL | Age: 34
End: 2019-04-03

## 2019-04-03 NOTE — OUTREACH NOTE
Medical Week 3 Survey      Responses   Facility patient discharged from?  [place]   Does the patient have one of the following disease processes/diagnoses(primary or secondary)?  Other   Week 3 attempt successful?  Yes   Call start time  1104   Call end time  1105   Discharge diagnosis  T1DM,  CAMILO   Person spoke with today (if not patient) and relationship  Priscilla, friend   Meds reviewed with patient/caregiver?  Yes   Is the patient having any side effects they believe may be caused by any medication additions or changes?  No   Does the patient have all medications ordered at discharge?  Yes   Is the patient taking all medications as directed (includes completed medication regime)?  Yes   Does the patient have a primary care provider?   Yes   Does the patient have an appointment with their PCP within 7 days of discharge?  Greater than 7 days   What is preventing the patient from scheduling follow up appointments within 7 days of discharge?  Earlier appointment not available   Nursing Interventions  Verified appointment date/time/provider   Has the patient kept scheduled appointments due by today?  Yes   Has home health visited the patient within 72 hours of discharge?  N/A   Psychosocial issues?  No   Did the patient receive a copy of their discharge instructions?  Yes   Nursing interventions  Reviewed instructions with patient   What is the patient's perception of their health status since discharge?  Improving   Is the patient/caregiver able to teach back signs and symptoms related to disease process for when to call PCP?  Yes   Is the patient/caregiver able to teach back signs and symptoms related to disease process for when to call 911?  Yes   Is the patient/caregiver able to teach back the hierarchy of who to call/visit for symptoms/problems? PCP, Specialist, Home health nurse, Urgent Care, ED, 911  Yes   Week 3 Call Completed?  Yes          Cleo Valenzuela RN

## 2019-04-10 ENCOUNTER — READMISSION MANAGEMENT (OUTPATIENT)
Dept: CALL CENTER | Facility: HOSPITAL | Age: 34
End: 2019-04-10

## 2019-04-10 NOTE — OUTREACH NOTE
Medical Week 4 Survey      Responses   Facility patient discharged from?  [place]   Does the patient have one of the following disease processes/diagnoses(primary or secondary)?  Other   Week 4 attempt successful?  No          No Mccoy RN

## 2019-06-24 PROBLEM — Z99.2 ESRD (END STAGE RENAL DISEASE) ON DIALYSIS (HCC): Chronic | Status: ACTIVE | Noted: 2019-01-01

## 2019-06-24 PROBLEM — N18.6 ESRD (END STAGE RENAL DISEASE) ON DIALYSIS (HCC): Chronic | Status: ACTIVE | Noted: 2019-01-01

## 2019-06-24 PROBLEM — G93.41 METABOLIC ENCEPHALOPATHY: Status: ACTIVE | Noted: 2019-01-01

## 2019-06-24 PROBLEM — E11.10 DKA, TYPE 2, NOT AT GOAL (HCC): Status: ACTIVE | Noted: 2019-01-01

## 2019-06-24 PROBLEM — E10.11 TYPE 1 DIABETES MELLITUS WITH KETOACIDOTIC COMA (HCC): Status: ACTIVE | Noted: 2019-01-01

## 2019-06-24 PROBLEM — E10.9 DM (DIABETES MELLITUS), TYPE 1 (HCC): Chronic | Status: ACTIVE | Noted: 2019-01-01

## 2019-06-24 PROBLEM — I10 SEVERE UNCONTROLLED HYPERTENSION: Chronic | Status: ACTIVE | Noted: 2019-01-01

## 2019-06-24 NOTE — CONSULTS
capsule 900 mg, 900 mg, Oral, Nightly  hydrALAZINE (APRESOLINE) tablet 25 mg, 25 mg, Oral, Q8H PRN    Allergies:  Amoxicillin; Penicillins; and Plavix [clopidogrel]    Habits:  TOBACCO:   reports that he has been smoking cigarettes. He does not have any smokeless tobacco history on file. ETOH:   reports that he drank alcohol. ILLICIT DRUGS:    Social History     Substance and Sexual Activity   Drug Use Never       SOCIAL HISTORY:   Modesta Alcala is single, lives in [place], [place] with his parents, and is disabled. FAMILY HISTORY:   No family history on file. REVIEW OF SYSTEMS:  Review of Systems   Unable to perform ROS: Patient unresponsive       PHYSICAL EXAMINATION:  Vitals: BP (!) 131/98   Pulse 144   Temp 100 °F (37.8 °C) (Temporal)   Resp 21   Ht 6' (1.829 m)   Wt 165 lb 12.8 oz (75.2 kg)   SpO2 100%   BMI 22.49 kg/m²   General appearance: He is intubated, sedated, and unresponsive in the ICU. Skin: normal turgor, some bruises on extremities. HEENT: Head: Normal, normocephalic, atraumatic. Neck:no adenopathy, no carotid bruit, no JVD, supple, symmetrical, trachea midline and thyroid not enlarged, symmetric, no tenderness/mass/nodules  Lungs: few course ronchi  Heart: tachycardic, regular, no murmer  Abdomen: soft, non-tender; bowel sounds normal; no masses,  no organomegaly  Extremities: extremities normal, atraumatic, no cyanosis or edema      NEUROLOGIC EXAMINATION:  Neurologic Exam     Mental Status   He is unresponsive to voice. He has minimal grimace and withdraw with noxious stimuli. Cranial Nerves   He has roving slow horizontal eye movements when eyelids held open. Pupils are midposition and reactive. Occulocephalic reflexes and corneal reflexes are intact. He is breathing over the ventilator. No facial asymmetry. No tongue atrophy or bite marks. Motor Exam   He has weak withdrawal of his upper and lower limbs. His tone is flaccid.      Gait, Coordination, and Reflexes

## 2019-06-24 NOTE — H&P
Laterality Date    DIALYSIS FISTULA CREATION      LUE distal, placed about the start of April 2019   [address]      as a small child     Social History     Tobacco History     Smoking Status  Current Every Day Smoker Smoking Frequency  1.5 packs/day for 17 years (25.5 pk yrs) Smoking Tobacco Type  Cigarettes    Smokeless Tobacco Use  Never Used    Tobacco Comment  NOW 1/2 PPD, started at about 12, averaged 1.5 PPD          Alcohol History     Alcohol Use Status  Not Currently          Drug Use     Drug Use Status  Never          Sexual Activity     Sexually Active  Not Asked            CODE STATUS: the family is deferring for now  Health Care Proxy: [id number] is his sister, Mrs. Maryana Simms, [phone]  AMBULATES: independently but with difficulty  DOMICILED: lives [facility] with his Mother and [place] mother, no pets, has a ramp in to home, no stairs inside home    Family History   Problem Relation Age of Onset    No Known Problems Mother     Other Father         brain tumour    Emphysema Father         smoker of many years    Hypertension Sister     No Known Problems Daughter      Allergies   Allergen Reactions    Amoxicillin     Penicillins Hives    Plavix [Clopidogrel]      Current Facility-Administered Medications   Medication Dose Route Frequency Provider Last Rate Last Dose    enoxaparin (LOVENOX) injection 40 mg  40 mg Subcutaneous Q24H Amy Raymundo MD   40 mg at 06/24/19 1615    dextrose 50 % IV solution  12.5 g Intravenous PRN Amy Raymundo MD        potassium chloride 10 mEq/100 mL IVPB (Peripheral Line)  10 mEq Intravenous PRN Amy Raymundo MD        magnesium sulfate 1 g in dextrose 5% 100 mL IVPB  1 g Intravenous PRN Amy Raymundo MD        sodium phosphate 10 mmol in dextrose 5 % 250 mL IVPB  10 mmol Intravenous PRJUAN CARLOS Raymundo MD        Or    sodium phosphate 15 mmol in dextrose 5 % 250 mL IVPB  15 mmol Intravenous PRJUAN CARLOS Raymundo MD

## 2019-06-25 NOTE — CONSULTS
Nephrology ([facility]) Consult Note      Patient:  Jeramy Yang  YOB: 1985  Date of Service: 6/24/2019  MRN: [id number]   Acct: [de-identified]   Primary Care Physician: No primary care provider on file. Advance Directive: Full Code  Admit Date: 6/24/2019       Hospital Day: 0  Referring Provider: Alfonso Alberto MD    Patient independently seen and examined, Chart, Consults, Notes, Operative notes, Labs, Cardiology, and Radiology studies reviewed as able. Subjective:  Jeramy Yang is a 35 y.o. male  whom we were consulted for ESRD. HD TTS pt from [facility]. Of note, I am unable to complete the nephrology consult order in [facility] due to a neurology note locking it. Recently admitted after being found down. Currently on the vent and unable to participate in the H&P. No family present. Seen with RN/Dr. Elen Thompson initially. HD Saturday prior to admit shortened due to vomiting. Recent admit in [facility] noted. Pt had PIV and PICC placed in access arm at OSH. Allergies:  Nsaids; Plavix [clopidogrel];  Amoxicillin; and Penicillins    Medicines:  Current Facility-Administered Medications   Medication Dose Route Frequency Provider Last Rate Last Dose    enoxaparin (LOVENOX) injection 40 mg  40 mg Subcutaneous Q24H Alfonso Alberto MD   40 mg at 06/24/19 1615    dextrose 50 % IV solution  12.5 g Intravenous PRN Alfonso Alberto MD        potassium chloride 10 mEq/100 mL IVPB (Peripheral Line)  10 mEq Intravenous PRN Alfonso Alberto MD        magnesium sulfate 1 g in dextrose 5% 100 mL IVPB  1 g Intravenous PRN Alfonso Alberto MD        sodium phosphate 10 mmol in dextrose 5 % 250 mL IVPB  10 mmol Intravenous PRN Alfonso Alberto MD        Or    sodium phosphate 15 mmol in dextrose 5 % 250 mL IVPB  15 mmol Intravenous PRN Alfonso Alberto MD        Or    sodium phosphate 20 mmol in dextrose 5 % 500 mL IVPB  20 mmol Intravenous PRN Alfonso Alberto MD        0.9 % sodium chloride 06/24/19 2212    cefTRIAXone (ROCEPHIN) 2 g in sodium chloride (PF) 20 mL IV syringe  2 g Intravenous Q24H Ariel Flores MD           Past Medical History:  Past Medical History:   Diagnosis Date    Diabetic neuropathy ([facility] of the feet     Diabetic retinopathy ([facility]     DM (diabetes mellitus), type 1 ([facility] 6/24/2019    ESRD (end stage renal disease) on dialysis ([facility] 1/95/0457    Metabolic encephalopathy 7/58/3133    Poor eyesight     Severe uncontrolled hypertension 6/24/2019       Past Surgical History:  Past Surgical History:   Procedure Laterality Date    DIALYSIS FISTULA CREATION      LUE distal, placed about the start of April 2019   [address]      as a small child       Family History  Family History   Problem Relation Age of Onset    No Known Problems Mother     Other Father         brain tumour    Emphysema Father         smoker of many years   [facility] Hypertension Sister     No Known Problems Daughter        Social History  Social History     Socioeconomic History    Marital status: Single     Spouse name: Not on file    Number of children: 1    Years of education: Not on file    Highest education level: Not on file   Occupational History    Occupation: disabled  and    Social Needs    Financial resource strain: Not on file    Food insecurity:     Worry: Not on file     Inability: Not on file    Transportation needs:     Medical: Not on file     Non-medical: Not on file   Tobacco Use    Smoking status: Current Every Day Smoker     Packs/day: 1.50     Years: 17.00     Pack years: 25.50     Types: Cigarettes    Smokeless tobacco: Never Used    Tobacco comment: NOW 1/2 PPD, started at about 16, averaged 1.5 PPD   Substance and Sexual Activity    Alcohol use: Not Currently    Drug use: Never    Sexual activity: Not on file   Lifestyle    Physical activity:     Days per week: Not on file     Minutes per session: Not on file    Stress: Not on file Relationships    Social connections:     Talks on phone: Not on file     Gets together: Not on file     Attends Rastafari service: Not on file     Active member of club or organization: Not on file     Attends meetings of clubs or organizations: Not on file     Relationship status: Not on file    Intimate partner violence:     Fear of current or ex partner: Not on file     Emotionally abused: Not on file     Physically abused: Not on file     Forced sexual activity: Not on file   Other Topics Concern    Not on file   Social History Narrative    CODE STATUS: the family is deferring for now    Health Care Proxy: Lakeisha Marquez is his sister, Mrs. Ace Madden, [phone]    AMBULATES: independently but with difficulty    DOMICILED: lives [facility] with his Mother and [place] mother, no pets, has a ramp in to home, no stairs inside home         Review of Systems:  History obtained from unobtainable from patient due to mental status      Objective:  Patient Vitals for the past 24 hrs:   BP Temp Temp src Pulse Resp SpO2 Height Weight   06/24/19 2242 -- -- -- 137 23 96 % -- --   06/24/19 2005 -- -- -- 140 27 97 % -- --   06/24/19 1900 (!) 124/91 -- -- 145 24 97 % -- --   06/24/19 1800 127/87 -- -- 139 24 97 % -- --   06/24/19 1700 (!) 144/96 -- -- 139 20 99 % -- --   06/24/19 1600 (!) 149/102 -- -- 140 23 100 % -- --   06/24/19 1531 (!) 131/98 100 °F (37.8 °C) Temporal 144 21 100 % 6' (1.829 m) 165 lb 12.8 oz (75.2 kg)       Intake/Output Summary (Last 24 hours) at 6/24/2019 2246  Last data filed at 6/24/2019 1800  Gross per 24 hour   Intake --   Output 150 ml   Net -150 ml     General: sedated/vent  Chest:  clear to auscultation bilaterally without respiratory distress  CVS: sinus tachy (150s)  Abdominal: soft, nontender, normal bowel sounds  Extremities: no cyanosis or edema  Skin: warm and dry       Labs:  BMP:   Recent Labs     06/24/19  1541 06/24/19  1732   *  --    K 4.5 4.2 CL 87*  --    CO2 26  --    PHOS 3.8  --    BUN 69*  --    CREATININE 7.8*  --    CALCIUM 11.5*  --      CBC: No results for input(s): WBC, HGB, HCT, MCV, PLT in the last 72 hours. LIVER PROFILE: No results for input(s): AST, ALT, LIPASE, BILIDIR, BILITOT, ALKPHOS in the last 72 hours. Invalid input(s): AMYLASE,  ALB  PT/INR: No results for input(s): PROTIME, INR in the last 72 hours. APTT: No results for input(s): APTT in the last 72 hours. BNP:  No results for input(s): BNP in the last 72 hours. Ionized Calcium:No results for input(s): IONCA in the last 72 hours. Magnesium:  Recent Labs     06/24/19  1541   MG 2.3     Phosphorus:  Recent Labs     06/24/19  1541   PHOS 3.8     HgbA1C: No results for input(s): LABA1C in the last 72 hours. Hepatic: No results for input(s): ALKPHOS, ALT, AST, PROT, BILITOT, BILIDIR, LABALBU in the last 72 hours. Lactic Acid:   Recent Labs     06/24/19  1743   LACTA 2.8*     Troponin: No results for input(s): CKTOTAL, CKMB, TROPONINT in the last 72 hours. ABGs: No results for input(s): PH, PCO2, PO2, HCO3, O2SAT in the last 72 hours. CRP:  No results for input(s): CRP in the last 72 hours. Sed Rate:  No results for input(s): SEDRATE in the last 72 hours. Cultures:   No results for input(s): CULTURE in the last 72 hours. No results for input(s): BC, [place]  in the last 72 hours. No results for input(s): CXSURG in the last 72 hours. Radiology reports as per the Radiologist  Radiology: Xr Chest Portable    Result Date: 6/24/2019  XR CHEST PORTABLE 6/24/2019 5:30 PM HISTORY: Enteric tube placement COMPARISON: None     Frontal chest radiograph demonstrates satisfactory position of the endotracheal and enteric tubes. A central venous catheter extends to the right atriocaval junction. A left upper extremity PICC extends into the proximal SVC or into the azygos vein. The lungs are clear.  Signed by Dr Vanessa Rodriguez on 6/24/2019 7:10 PM       Assessment

## 2019-06-25 NOTE — CARE COORDINATION
Patient beginning to have a left, downward gaze once again. Pupillary change also noted. Left 3 and sluggish and right 4 and sluggish. Dr. Maggy Santos notified. CT ordered.

## 2019-06-25 NOTE — PLAN OF CARE
Problem: Physical Regulation:  Goal: Signs of adequate cerebral perfusion will increase  Description  Signs of adequate cerebral perfusion will increase  Outcome: Ongoing  Goal: Ability to maintain a stable neurologic state will improve  Description  Ability to maintain a stable neurologic state will improve  Outcome: Ongoing     Problem: Safety:  Goal: Ability to remain free from injury will improve  Description  Ability to remain free from injury will improve  Outcome: Ongoing     Problem: Falls - Risk of:  Goal: Will remain free from falls  Description  Will remain free from falls  Outcome: Ongoing  Goal: Absence of physical injury  Description  Absence of physical injury  Outcome: Ongoing     Problem: Risk for Impaired Skin Integrity  Goal: Tissue integrity - skin and mucous membranes  Description  Structural intactness and normal physiological function of skin and  mucous membranes.   Outcome: Ongoing

## 2019-06-25 NOTE — PROGRESS NOTES
Mich Chan (MRN [id number]) as of 6/25/2019 15:49   Ref. Range 6/25/2019 06:30 6/25/2019 08:05 6/25/2019 09:25 6/25/2019 10:27 6/25/2019 12:06   Sodium Latest Ref Range: 136 - 145 mmol/L 135 (L)    135 (L)   Potassium Latest Ref Range: 3.5 - 5.0 mmol/L 4.6    4.4   Chloride Latest Ref Range: 98 - 111 mmol/L 90 (L)    92 (L)   CO2 Latest Ref Range: 22 - 29 mmol/L 28    29   BUN Latest Ref Range: 6 - 20 mg/dL 74 (H)    77 (H)   Creatinine Latest Ref Range: 0.5 - 1.2 mg/dL 8.6 (H)    9.0 (H)   Anion Gap Latest Ref Range: 7 - 19 mmol/L 17    14   GFR Non- Latest Ref Range: >60  7 (A)    7 (A)   Magnesium Latest Ref Range: 1.6 - 2.6 mg/dL 2.1    2.0   Glucose Latest Ref Range: 74 - 109 mg/dL 201 (H)    181 (H)   POC Glucose Latest Ref Range: 70 - 99 mg/dl  174 (H) 157 (H) 172 (H)    Calcium Latest Ref Range: 8.6 - 10.0 mg/dL 10.3 (H)    10.2 (H)   Phosphorus Latest Ref Range: 2.5 - 4.5 mg/dL 4.0    3.7   Total Protein Latest Ref Range: 6.6 - 8.7 g/dL 6.5 (L)       Albumin Latest Ref Range: 3.5 - 5.2 g/dL 3.3 (L)       Alk Phos Latest Ref Range: 40 - 130 U/L 75       ALT Latest Ref Range: 5 - 41 U/L 11       AST Latest Ref Range: 5 - 40 U/L 23       Bilirubin Latest Ref Range: 0.2 - 1.2 mg/dL 0.7         DIAGNOSTICS:  CT Head on [id number]:  Narrative   EXAM: CT HEAD WO CONTRAST -- 6/25/2019 4:00 AM   HISTORY: 33 years, Male, seizure, abnormal neurologic exam, no known   trauma   COMPARISON: None   DLP: 742 mGy cm. Automated exposure control was utilized to minimize   patient radiation dose. TECHNIQUE: Unenhanced axial CT images obtained from vertex to skull   base with coronal and sagittal reformats. FINDINGS:   Limited exam due to artifact. No acute intracranial hemorrhage, midline shift, mass effect or large   territory cortical infarct. There is effacement of the   parieto-occipital sulci. Ventricles and basilar cisterns remain   patent. No midline shift.    Globes, retrobulbar soft tissues, paranasal sinuses, mastoid air cells   and external auditory canals are within normal limits. Calvarium   appears intact. Subcutaneous tissues within normal limits.       Impression   1. Effacement of the parieto-occipital sulci, which is nonspecific,   but could indicate cerebral edema. Result communicated by telephone to Phil Davalos RN at 7:34 AM on   6/25/2019. Signed by Dr Terrel Cabot on 6/25/2019 7:36 AM     NEURODIAGNOSTICS:  EEG [id number] for 24 hours continuous monitoring  Interpretation pending        Assessment:     Active Problems:    Type 1 diabetes mellitus with ketoacidotic coma (HCC)    ESRD (end stage renal disease) on dialysis (HCC)    Severe uncontrolled hypertension    Metabolic encephalopathy    Status epilepticus ([address]    Anoxic encephalopathy ([address]  Resolved Problems:    * No resolved hospital problems.  *        Plan:     COMA secondary to Post-Ictal State versus DKA on Type 1 DM   ESRD on HD, Metabolic Alkalosis with Respiratory Alkalosis  Uncontrolled Severe HTN  Metabolic +/- Hypertensive Encephalopathy  Neuropathic Pain     Cubicine 450mg Q48h, started for MRSA coverage as per suspicion of Staph Infection of tunneled cath d/e bandage condition and gape at site  Keppra 500mg IV Q8h  Glargine 11 units SQ QHS  Low Dose ISS  Lantus 15 units SQ QHS  Hypoglycemic order set  POCT Q6h and PRN  Midazolam GGT  Nicardipine GGT  HOLD Amitriptyline 25mg PO QHS  HOLD Duloxetine 60mg PO QDay  HOLD Neurontin 900mg PO QHS  Famotidine 20mg PO QDay in place of home Ranitidine  HOLD Amlodipine 5mg PO QHS - AS NPO  HOLD Hydralazine - AS NPO  Vasotec 0.625mg IV Q6h in place of home Lisinopril 5mg PO QDay  HOLD Sevelamer 800mg PO TIDWM  Loazepam IV 1mg PRN Seizure  Mag and Potassium and Sodium Phosphate protocols  Nephro consult for HD  [place] for Is and Os - strict  Tele   Oximetry  EEG pending read     Supportive and Prophylactic Txx:   DVT PPx: Lovenox 40mg SQ QDay  GI (PUD) PPx: as per above  PT: not able to

## 2019-06-25 NOTE — CARE COORDINATION
Dr. Chuck Maurer made aware of patient output per OG. Medication changed to IV or held. Also aware of temperature that patient has spiked through the night.

## 2019-06-25 NOTE — PROGRESS NOTES
[address] [facility] Neurology  [address] [place] [address] [address]  [place], [address]  Phone [phone]     Neurology Progress Note  2019 9:21 AM  Information:   Patient Name: Caprice Han  :   1985  Age:   35 y.o. MRN:   [id number]  Account #:  [de-identified]  Admit Date:   2019  Today:  19     ADMIT DX:   Status epilepticus    Subjective:     Caprice Han is a 35y.o. year old man with a history of diabetes type 1 with recent hemodialysis initiation who was transferred from the ER in [address] [place] [address]  for loss of consciousness, diabetic ketoacidosis, and possible seizures. He was found unresponsive with tonic limb movements. Interval History:   EEG yesterday showed very low amplitude very slow activity. He was put back on Versed drip after the EEG. He had several \"seizures\" last night described and tonic limb movements. Versed increased to 3 mg/hr, did not seem to help. Nursing reported leftward gaze and pupilary asymmetry, a change. CT head ordered. Presently he remains unresponsive on vent in ICU with Versed drip at 3mg/hr.       Objective:     Past Medical History:  Past Medical History:   Diagnosis Date    Diabetic neuropathy ([address] of the feet     Diabetic retinopathy ([address]     DM (diabetes mellitus), type 1 ([address] 2019    ESRD (end stage renal disease) on dialysis ([address]     Metabolic encephalopathy 7549    Poor eyesight     Severe uncontrolled hypertension 2019       Past Surgical History:   Procedure Laterality Date    DIALYSIS FISTULA CREATION      LUE distal, placed about the start of 2019    HERNIA REPAIR      as a small child       Family History   Problem Relation Age of Onset    No Known Problems Mother     Other Father         brain tumour    Emphysema Father         smoker of many years   [facility] Hypertension Sister     No Known Problems Daughter        Social History     Socioeconomic History    Marital status: Single     Spouse name: Not on file    Result Value Ref Range    POC Glucose 239 (H) 70 - 99 mg/dl    Performed on AccuChek    POCT Glucose    Collection Time: 06/24/19 10:11 PM   Result Value Ref Range    POC Glucose 151 (H) 70 - 99 mg/dl    Performed on AccuChek    Respiratory Culture    Collection Time: 06/24/19 10:15 PM   Result Value Ref Range    CULTURE, RESPIRATORY Normal respiratory micah     Gram Stain Result       Many WBC's (Polymorphonuclear)  Few Epithelial Cells  Many Mixed bacterial morphotypes suggestive of  Normal respiratory micah     Basic Metabolic Panel    Collection Time: 06/24/19 10:26 PM   Result Value Ref Range    Sodium 137 136 - 145 mmol/L    Potassium 4.0 3.5 - 5.0 mmol/L    Chloride 90 (L) 98 - 111 mmol/L    CO2 29 22 - 29 mmol/L    Anion Gap 18 7 - 19 mmol/L    Glucose 170 (H) 74 - 109 mg/dL    BUN 70 (H) 6 - 20 mg/dL    CREATININE 8.0 (H) 0.5 - 1.2 mg/dL    GFR Non-African American 8 (A) >60    Calcium 10.9 (H) 8.6 - 10.0 mg/dL   Magnesium    Collection Time: 06/24/19 10:26 PM   Result Value Ref Range    Magnesium 2.1 1.6 - 2.6 mg/dL   Phosphorus    Collection Time: 06/24/19 10:26 PM   Result Value Ref Range    Phosphorus 3.0 2.5 - 4.5 mg/dL   CK    Collection Time: 06/24/19 10:26 PM   Result Value Ref Range    Total  (H) 39 - 308 U/L   CBC auto differential    Collection Time: 06/24/19 10:26 PM   Result Value Ref Range    WBC 12.3 (H) 4.8 - 10.8 K/uL    RBC 3.96 (L) 4.70 - 6.10 M/uL    Hemoglobin 11.6 (L) 14.0 - 18.0 g/dL    Hematocrit 33.6 (L) 42.0 - 52.0 %    MCV 84.8 80.0 - 94.0 fL    MCH 29.3 27.0 - 31.0 pg    MCHC 34.5 33.0 - 37.0 g/dL    RDW 14.4 11.5 - 14.5 %    Platelets 795 (L) 693 - 400 K/uL    MPV 10.4 9.4 - 12.4 fL    PLATELET SLIDE REVIEW Adequate     Neutrophils % 69.0 (H) 50.0 - 65.0 %    Lymphocytes % 18.0 (L) 20.0 - 40.0 %    Monocytes % 3.0 0.0 - 10.0 %    Eosinophils % 0.0 0.0 - 5.0 %    Basophils % 0.0 0.0 - 1.0 %    Neutrophils # 9.7 (H) 1.5 - 7.5 K/uL    Lymphocytes # 2.2 1.1 - 4.5 K/uL Monocytes # 0.40 0.00 - 0.90 K/uL    Eosinophils # 0.00 0.00 - 0.60 K/uL    Basophils # 0.00 0.00 - 0.20 K/uL    Bands Relative 10 (H) 0 - 5 %    RBC Morphology Normal    POCT Glucose    Collection Time: 06/24/19 11:27 PM   Result Value Ref Range    POC Glucose 178 (H) 70 - 99 mg/dl    Performed on AccuChek    POCT Glucose    Collection Time: 06/25/19 12:28 AM   Result Value Ref Range    POC Glucose 175 (H) 70 - 99 mg/dl    Performed on AccuChek    POCT Glucose    Collection Time: 06/25/19  1:05 AM   Result Value Ref Range    POC Glucose 180 (H) 70 - 99 mg/dl    Performed on AccuChek    Basic Metabolic Panel    Collection Time: 06/25/19  1:40 AM   Result Value Ref Range    Sodium 137 136 - 145 mmol/L    Potassium 4.5 3.5 - 5.0 mmol/L    Chloride 90 (L) 98 - 111 mmol/L    CO2 30 (H) 22 - 29 mmol/L    Anion Gap 17 7 - 19 mmol/L    Glucose 199 (H) 74 - 109 mg/dL    BUN 75 (H) 6 - 20 mg/dL    CREATININE 8.8 (H) 0.5 - 1.2 mg/dL    GFR Non-African American 7 (A) >60    Calcium 10.9 (H) 8.6 - 10.0 mg/dL   Magnesium    Collection Time: 06/25/19  1:40 AM   Result Value Ref Range    Magnesium 2.2 1.6 - 2.6 mg/dL   Phosphorus    Collection Time: 06/25/19  1:40 AM   Result Value Ref Range    Phosphorus 3.5 2.5 - 4.5 mg/dL   POCT Glucose    Collection Time: 06/25/19  2:59 AM   Result Value Ref Range    POC Glucose 181 (H) 70 - 99 mg/dl    Performed on AccuChek    Blood Gas, Arterial    Collection Time: 06/25/19  4:25 AM   Result Value Ref Range    pH, Arterial 7.520 (HH) 7.350 - 7.450    pCO2, Arterial 39.0 35.0 - 45.0 mmHg    pO2, Arterial 112.0 (H) 80.0 - 100.0 mmHg    HCO3, Arterial 31.8 (H) 22.0 - 26.0 mmol/L    Base Excess, Arterial 8.3 (H) -2.0 - 2.0 mmol/L    Hemoglobin, Art, Extended 11.3 (L) 14.0 - 18.0 g/dL    O2 Sat, Arterial 96.1 >92 %    Carboxyhgb, Arterial 1.9 0.0 - 5.0 %    Methemoglobin, Arterial 1.0 <1.5 %    O2 Content, Arterial 15.4 Not Established mL/dL    O2 Therapy Unknown    Potassium, Whole Blood Collection Time: 06/25/19  4:25 AM   Result Value Ref Range    Potassium, Whole Blood 4.3    POCT Glucose    Collection Time: 06/25/19  4:54 AM   Result Value Ref Range    POC Glucose 196 (H) 70 - 99 mg/dl    Performed on AccuChek    POCT Glucose    Collection Time: 06/25/19  6:28 AM   Result Value Ref Range    POC Glucose 197 (H) 70 - 99 mg/dl    Performed on AccuChek    Magnesium    Collection Time: 06/25/19  6:30 AM   Result Value Ref Range    Magnesium 2.1 1.6 - 2.6 mg/dL   Phosphorus    Collection Time: 06/25/19  6:30 AM   Result Value Ref Range    Phosphorus 4.0 2.5 - 4.5 mg/dL   Comprehensive Metabolic Panel    Collection Time: 06/25/19  6:30 AM   Result Value Ref Range    Sodium 135 (L) 136 - 145 mmol/L    Potassium 4.6 3.5 - 5.0 mmol/L    Chloride 90 (L) 98 - 111 mmol/L    CO2 28 22 - 29 mmol/L    Anion Gap 17 7 - 19 mmol/L    Glucose 201 (H) 74 - 109 mg/dL    BUN 74 (H) 6 - 20 mg/dL    CREATININE 8.6 (H) 0.5 - 1.2 mg/dL    GFR Non-African American 7 (A) >60    Calcium 10.3 (H) 8.6 - 10.0 mg/dL    Total Protein 6.5 (L) 6.6 - 8.7 g/dL    Alb 3.3 (L) 3.5 - 5.2 g/dL    Total Bilirubin 0.7 0.2 - 1.2 mg/dL    Alkaline Phosphatase 75 40 - 130 U/L    ALT 11 5 - 41 U/L    AST 23 5 - 40 U/L   POCT Glucose    Collection Time: 06/25/19  8:05 AM   Result Value Ref Range    POC Glucose 174 (H) 70 - 99 mg/dl    Performed on AccuChek      EEG is nearly flat. I see no epileptiform abnormalities during time \"seizures\" reported. Narrative   EXAM: CT HEAD WO CONTRAST -- 6/25/2019 4:00 AM   HISTORY: 33 years, Male, seizure, abnormal neurologic exam, no known   trauma   COMPARISON: None   DLP: 742 mGy cm. Automated exposure control was utilized to minimize   patient radiation dose. TECHNIQUE: Unenhanced axial CT images obtained from vertex to skull   base with coronal and sagittal reformats. FINDINGS:   Limited exam due to artifact.    No acute intracranial hemorrhage, midline shift, mass effect or large   territory

## 2019-06-25 NOTE — PROGRESS NOTES
Dr. Flor Muñiz and Dr. Bahman Mclean at bedside with pt's family discussing prognosis.     Electronically signed by Pal Dent RN on 6/25/2019 at 4:32 PM clear

## 2019-06-25 NOTE — PROGRESS NOTES
EEG tech called to attach leads for continuous monitoring. No answer, message left on answering service.     Electronically signed by Pal Dent RN on 6/25/2019 at 9:21 AM

## 2019-06-26 PROBLEM — J96.01 ACUTE HYPOXEMIC RESPIRATORY FAILURE (HCC): Status: ACTIVE | Noted: 2019-01-01

## 2019-06-26 NOTE — DISCHARGE SUMMARY
indicated.       Signed:  Benton Duggan  6/26/2019  10:04 AM     Additional >5 min spent examining patient, reviewed vitals

## 2019-06-26 NOTE — PLAN OF CARE
Problem: Physical Regulation:  Goal: Signs of adequate cerebral perfusion will increase  Description  Signs of adequate cerebral perfusion will increase  6/26/2019 0853 by Tito Silvestre RN  Outcome: Ongoing  6/26/2019 0022 by Judge Aliza RN  Outcome: Ongoing  Goal: Ability to maintain a stable neurologic state will improve  Description  Ability to maintain a stable neurologic state will improve  6/26/2019 0853 by Tito Silvestre RN  Outcome: Ongoing  6/26/2019 0022 by Judge Aliza RN  Outcome: Ongoing     Problem: Safety:  Goal: Ability to remain free from injury will improve  Description  Ability to remain free from injury will improve  6/26/2019 0853 by Tito Silvestre RN  Outcome: Ongoing  6/26/2019 0022 by Judge Aliza RN  Outcome: Ongoing     Problem: Falls - Risk of:  Goal: Will remain free from falls  Description  Will remain free from falls  6/26/2019 0853 by Tito Silvestre RN  Outcome: Ongoing  6/26/2019 0022 by Judge Aliza RN  Outcome: Ongoing  Goal: Absence of physical injury  Description  Absence of physical injury  6/26/2019 0853 by Tito Silvestre RN  Outcome: Ongoing  6/26/2019 0022 by Judge Aliza RN  Outcome: Ongoing     Problem: Risk for Impaired Skin Integrity  Goal: Tissue integrity - skin and mucous membranes  Description  Structural intactness and normal physiological function of skin and  mucous membranes.   6/26/2019 0853 by Tito Silvestre RN  Outcome: Ongoing  6/26/2019 0022 by Judge Aliza RN  Outcome: Ongoing

## 2019-06-26 NOTE — PROGRESS NOTES
Known Problems Mother     Other Father         brain tumour    Emphysema Father         smoker of many years   [facility] Hypertension Sister     No Known Problems Daughter        Social History  Social History     Socioeconomic History    Marital status: Single     Spouse name: Not on file    Number of children: 1    Years of education: Not on file    Highest education level: Not on file   Occupational History    Occupation: disabled  and    Social Needs    Financial resource strain: Not on file    Food insecurity:     Worry: Not on file     Inability: Not on file    Transportation needs:     Medical: Not on file     Non-medical: Not on file   Tobacco Use    Smoking status: Current Every Day Smoker     Packs/day: 1.50     Years: 17.00     Pack years: 25.50     Types: Cigarettes    Smokeless tobacco: Never Used    Tobacco comment: NOW 1/2 PPD, started at about 16, averaged 1.5 PPD   Substance and Sexual Activity    Alcohol use: Not Currently    Drug use: Never    Sexual activity: Not on file   Lifestyle    Physical activity:     Days per week: Not on file     Minutes per session: Not on file    Stress: Not on file   Relationships    Social connections:     Talks on phone: Not on file     Gets together: Not on file     Attends [employer] service: Not on file     Active member of club or organization: Not on file     Attends meetings of clubs or organizations: Not on file     Relationship status: Not on file    Intimate partner violence:     Fear of current or ex partner: Not on file     Emotionally abused: Not on file     Physically abused: Not on file     Forced sexual activity: Not on file   Other Topics Concern    Not on file   Social History Narrative    CODE STATUS: the family is deferring for now    Health Care Proxy: [id number] Miguel Marquez is his sister, Mrs. Myles Croft, [phone]    AMBULATES: independently but with difficulty    DOMICILED: lives [place] 135 28 95 % --   06/25/19 1217 (!) 154/95 100.6 °F (38.1 °C) Axillary 134 (!) 31 95 % --   06/25/19 1200 137/89 100.3 °F (37.9 °C) Temporal 134 30 94 % --   06/25/19 1100 (!) 160/95 -- -- 136 (!) 37 96 % --       Intake/Output Summary (Last 24 hours) at 6/26/2019 1042  Last data filed at 6/26/2019 1000  Gross per 24 hour   Intake 2017.93 ml   Output 0 ml   Net 2017.93 ml     General: sedated/vent  Chest:  clear to auscultation bilaterally without respiratory distress  CVS: sinus tachy (100s)  Abdominal: soft, nontender, normal bowel sounds  Extremities: no cyanosis or edema  Skin: warm and dry       Labs:  BMP:   Recent Labs     06/25/19  0140 06/25/19  0425 06/25/19  0630 06/25/19  1206     --  135* 135*   K 4.5 4.3 4.6 4.4   CL 90*  --  90* 92*   CO2 30*  --  28 29   PHOS 3.5  --  4.0 3.7   BUN 75*  --  74* 77*   CREATININE 8.8*  --  8.6* 9.0*   CALCIUM 10.9*  --  10.3* 10.2*     CBC:   Recent Labs     06/24/19  2226   WBC 12.3*   HGB 11.6*   HCT 33.6*   MCV 84.8   *     LIVER PROFILE:   Recent Labs     06/25/19  0630   AST 23   ALT 11   BILITOT 0.7   ALKPHOS 75     PT/INR: No results for input(s): PROTIME, INR in the last 72 hours. APTT: No results for input(s): APTT in the last 72 hours. BNP:  No results for input(s): BNP in the last 72 hours. Ionized Calcium:No results for input(s): IONCA in the last 72 hours. Magnesium:  Recent Labs     06/25/19  0140 06/25/19  0630 06/25/19  1206   MG 2.2 2.1 2.0     Phosphorus:  Recent Labs     06/25/19  0140 06/25/19  0630 06/25/19  1206   PHOS 3.5 4.0 3.7     HgbA1C: No results for input(s): LABA1C in the last 72 hours. Hepatic:   Recent Labs     06/25/19  0630   ALKPHOS 75   ALT 11   AST 23   PROT 6.5*   BILITOT 0.7   LABALBU 3.3*     Lactic Acid:   Recent Labs     06/24/19  1743   LACTA 2.8*     Troponin:   Recent Labs     06/24/19  2226   CKTOTAL 345*     ABGs: No results for input(s): PH, PCO2, PO2, HCO3, O2SAT in the last 72 hours.   CRP:  No results

## 2019-06-30 LAB
BLOOD CULTURE, ROUTINE: NORMAL
CULTURE, BLOOD 2: NORMAL
CULTURE, BLOOD 3: NORMAL

## (undated) DEVICE — PK TURNOVER RM ADV

## (undated) DEVICE — CVR PROB GEN PURP W ISOSILK 6X48

## (undated) DEVICE — BNDG ADHS CURAD FLX/FABRC 1X3IN STRL LF

## (undated) DEVICE — SUT MNCRYL 4/0 PS2 27IN UD MCP426H

## (undated) DEVICE — 3M™ STERI-STRIP™ REINFORCED ADHESIVE SKIN CLOSURES, R1547, 1/2 IN X 4 IN (12 MM X 100 MM), 6 STRIPS/ENVELOPE: Brand: 3M™ STERI-STRIP™

## (undated) DEVICE — SYR SLP TP 10ML DISP

## (undated) DEVICE — INTENDED FOR TISSUE SEPARATION, AND OTHER PROCEDURES THAT REQUIRE A SHARP SURGICAL BLADE TO PUNCTURE OR CUT.: Brand: BARD-PARKER ® STAINLESS STEEL BLADES

## (undated) DEVICE — SYR PRECISIONGLIDE LL 5CC 20X1 1/2IN

## (undated) DEVICE — SPNG GZ 2S 2X2 8PLY STRL PK/2

## (undated) DEVICE — ST MIC/INTRO ACC SHRP/NDL TUNG/TP NITNL 5F 45CM 7CM

## (undated) DEVICE — PROXIMATE RH ROTATING HEAD SKIN STAPLERS (35 WIDE) CONTAINS 35 STAINLESS STEEL STAPLES: Brand: PROXIMATE

## (undated) DEVICE — SYR LUERLOK 20CC

## (undated) DEVICE — PAD MINOR UNIVERSAL: Brand: MEDLINE INDUSTRIES, INC.

## (undated) DEVICE — SNAP KOVER: Brand: UNBRANDED

## (undated) DEVICE — APPL CHLORAPREP W/TINT 26ML ORNG

## (undated) DEVICE — SUT ETHLN 2/0 FS 18IN 664H

## (undated) DEVICE — GLV SURG SENSICARE W/ALOE PF LF 7.5 STRL

## (undated) DEVICE — EQUISTREAM XK HEMODIALYSIS CATH W/STYLET, ST, AIRGUARD, 16 FR. 23CM: Brand: EQUISTREAM XK LONG-TERM HEMODIALYSIS CATHETER WITH PRELOADED STYLET